# Patient Record
Sex: MALE | Race: WHITE | NOT HISPANIC OR LATINO | Employment: OTHER | URBAN - METROPOLITAN AREA
[De-identification: names, ages, dates, MRNs, and addresses within clinical notes are randomized per-mention and may not be internally consistent; named-entity substitution may affect disease eponyms.]

---

## 2023-03-04 PROCEDURE — 99232 SBSQ HOSP IP/OBS MODERATE 35: CPT | Performed by: INTERNAL MEDICINE

## 2023-03-05 PROCEDURE — 99232 SBSQ HOSP IP/OBS MODERATE 35: CPT | Performed by: INTERNAL MEDICINE

## 2023-03-06 PROCEDURE — 99232 SBSQ HOSP IP/OBS MODERATE 35: CPT | Performed by: INTERNAL MEDICINE

## 2023-03-07 PROCEDURE — 99232 SBSQ HOSP IP/OBS MODERATE 35: CPT | Performed by: INTERNAL MEDICINE

## 2023-03-08 PROCEDURE — 99239 HOSP IP/OBS DSCHRG MGMT >30: CPT | Performed by: INTERNAL MEDICINE

## 2023-03-11 ENCOUNTER — HOSPITAL ENCOUNTER (INPATIENT)
Dept: DATA CONVERSION | Facility: HOSPITAL | Age: 68
Discharge: HOME HEALTH | End: 2023-03-17
Attending: STUDENT IN AN ORGANIZED HEALTH CARE EDUCATION/TRAINING PROGRAM | Admitting: STUDENT IN AN ORGANIZED HEALTH CARE EDUCATION/TRAINING PROGRAM
Payer: MEDICARE

## 2023-03-11 DIAGNOSIS — Z48.01 ENCOUNTER FOR CHANGE OR REMOVAL OF SURGICAL WOUND DRESSING: ICD-10-CM

## 2023-03-11 DIAGNOSIS — Z20.822 CONTACT WITH AND (SUSPECTED) EXPOSURE TO COVID-19: ICD-10-CM

## 2023-03-11 DIAGNOSIS — Z87.891 PERSONAL HISTORY OF NICOTINE DEPENDENCE: ICD-10-CM

## 2023-03-11 DIAGNOSIS — F10.21 ALCOHOL DEPENDENCE, IN REMISSION (MULTI): ICD-10-CM

## 2023-03-11 DIAGNOSIS — E78.5 HYPERLIPIDEMIA, UNSPECIFIED: ICD-10-CM

## 2023-03-11 DIAGNOSIS — I10 ESSENTIAL (PRIMARY) HYPERTENSION: ICD-10-CM

## 2023-03-11 DIAGNOSIS — T81.32XA DISRUPTION OF INTERNAL OPERATION (SURGICAL) WOUND, NOT ELSEWHERE CLASSIFIED, INITIAL ENCOUNTER: ICD-10-CM

## 2023-03-11 DIAGNOSIS — G58.8 OTHER SPECIFIED MONONEUROPATHIES: ICD-10-CM

## 2023-03-11 DIAGNOSIS — K21.9 GASTRO-ESOPHAGEAL REFLUX DISEASE WITHOUT ESOPHAGITIS: ICD-10-CM

## 2023-03-11 DIAGNOSIS — D62 ACUTE POSTHEMORRHAGIC ANEMIA: ICD-10-CM

## 2023-03-11 LAB
ABO GROUP (TYPE) IN BLOOD: NORMAL
ACTIVATED PARTIAL THROMBOPLASTIN TIME IN PPP BY COAGULATION ASSAY: 30 SEC (ref 26–39)
ALBUMIN (G/DL) IN SER/PLAS: 3.9 G/DL (ref 3.4–5)
ANION GAP IN SER/PLAS: 13 MMOL/L (ref 10–20)
ANTIBODY SCREEN: NORMAL
APPEARANCE, URINE: CLEAR
BILIRUBIN, URINE: NEGATIVE
BLOOD, URINE: NEGATIVE
C REACTIVE PROTEIN (MG/L) IN SER/PLAS: 4.78 MG/DL
CALCIUM (MG/DL) IN SER/PLAS: 10.3 MG/DL (ref 8.6–10.6)
CARBON DIOXIDE, TOTAL (MMOL/L) IN SER/PLAS: 25 MMOL/L (ref 21–32)
CHLORIDE (MMOL/L) IN SER/PLAS: 100 MMOL/L (ref 98–107)
COLOR, URINE: YELLOW
CREATININE (MG/DL) IN SER/PLAS: 0.83 MG/DL (ref 0.5–1.3)
ERYTHROCYTE DISTRIBUTION WIDTH (RATIO) BY AUTOMATED COUNT: 12.5 % (ref 11.5–14.5)
ERYTHROCYTE MEAN CORPUSCULAR HEMOGLOBIN CONCENTRATION (G/DL) BY AUTOMATED: 32.5 G/DL (ref 32–36)
ERYTHROCYTE MEAN CORPUSCULAR VOLUME (FL) BY AUTOMATED COUNT: 88 FL (ref 80–100)
ERYTHROCYTES (10*6/UL) IN BLOOD BY AUTOMATED COUNT: 3.56 X10E12/L (ref 4.5–5.9)
GFR MALE: >90 ML/MIN/1.73M2
GLUCOSE (MG/DL) IN SER/PLAS: 113 MG/DL (ref 74–99)
GLUCOSE, URINE: NEGATIVE MG/DL
HEMATOCRIT (%) IN BLOOD BY AUTOMATED COUNT: 31.4 % (ref 41–52)
HEMOGLOBIN (G/DL) IN BLOOD: 10.2 G/DL (ref 13.5–17.5)
INR IN PPP BY COAGULATION ASSAY: 0.9 (ref 0.9–1.1)
KETONES, URINE: NEGATIVE MG/DL
LEUKOCYTE ESTERASE, URINE: NEGATIVE
LEUKOCYTES (10*3/UL) IN BLOOD BY AUTOMATED COUNT: 6.8 X10E9/L (ref 4.4–11.3)
NITRITE, URINE: NEGATIVE
NRBC (PER 100 WBCS) BY AUTOMATED COUNT: 0 /100 WBC (ref 0–0)
PH, URINE: 7 (ref 5–8)
PHOSPHATE (MG/DL) IN SER/PLAS: 3.6 MG/DL (ref 2.5–4.9)
PLATELETS (10*3/UL) IN BLOOD AUTOMATED COUNT: 296 X10E9/L (ref 150–450)
POTASSIUM (MMOL/L) IN SER/PLAS: 4.2 MMOL/L (ref 3.5–5.3)
PROTEIN, URINE: NEGATIVE MG/DL
PROTHROMBIN TIME (PT) IN PPP BY COAGULATION ASSAY: 10.9 SEC (ref 9.8–13.4)
RH FACTOR: NORMAL
SEDIMENTATION RATE, ERYTHROCYTE: 78 MM/H (ref 0–20)
SODIUM (MMOL/L) IN SER/PLAS: 134 MMOL/L (ref 136–145)
SPECIFIC GRAVITY, URINE: 1.01 (ref 1–1.03)
UREA NITROGEN (MG/DL) IN SER/PLAS: 12 MG/DL (ref 6–23)
UROBILINOGEN, URINE: <2 MG/DL (ref 0–1.9)

## 2023-03-13 LAB
ABO GROUP (TYPE) IN BLOOD: NORMAL
ALBUMIN (G/DL) IN SER/PLAS: 4.1 G/DL (ref 3.4–5)
ANION GAP IN SER/PLAS: 16 MMOL/L (ref 10–20)
CALCIUM (MG/DL) IN SER/PLAS: 10.1 MG/DL (ref 8.6–10.6)
CARBON DIOXIDE, TOTAL (MMOL/L) IN SER/PLAS: 26 MMOL/L (ref 21–32)
CHLORIDE (MMOL/L) IN SER/PLAS: 99 MMOL/L (ref 98–107)
CREATININE (MG/DL) IN SER/PLAS: 1.08 MG/DL (ref 0.5–1.3)
ERYTHROCYTE DISTRIBUTION WIDTH (RATIO) BY AUTOMATED COUNT: 12.9 % (ref 11.5–14.5)
ERYTHROCYTE MEAN CORPUSCULAR HEMOGLOBIN CONCENTRATION (G/DL) BY AUTOMATED: 32.5 G/DL (ref 32–36)
ERYTHROCYTE MEAN CORPUSCULAR VOLUME (FL) BY AUTOMATED COUNT: 88 FL (ref 80–100)
ERYTHROCYTES (10*6/UL) IN BLOOD BY AUTOMATED COUNT: 3.84 X10E12/L (ref 4.5–5.9)
GFR MALE: 75 ML/MIN/1.73M2
GLUCOSE (MG/DL) IN SER/PLAS: 101 MG/DL (ref 74–99)
HEMATOCRIT (%) IN BLOOD BY AUTOMATED COUNT: 33.8 % (ref 41–52)
HEMOGLOBIN (G/DL) IN BLOOD: 11 G/DL (ref 13.5–17.5)
LEUKOCYTES (10*3/UL) IN BLOOD BY AUTOMATED COUNT: 9.2 X10E9/L (ref 4.4–11.3)
NRBC (PER 100 WBCS) BY AUTOMATED COUNT: 0 /100 WBC (ref 0–0)
PHOSPHATE (MG/DL) IN SER/PLAS: 4.2 MG/DL (ref 2.5–4.9)
PLATELETS (10*3/UL) IN BLOOD AUTOMATED COUNT: 400 X10E9/L (ref 150–450)
POTASSIUM (MMOL/L) IN SER/PLAS: 4.2 MMOL/L (ref 3.5–5.3)
RH FACTOR: NORMAL
SARS-COV-2 RESULT: NOT DETECTED
SODIUM (MMOL/L) IN SER/PLAS: 137 MMOL/L (ref 136–145)
UREA NITROGEN (MG/DL) IN SER/PLAS: 21 MG/DL (ref 6–23)

## 2023-03-14 LAB
ABO GROUP (TYPE) IN BLOOD: NORMAL
ANTIBODY SCREEN: NORMAL
GRAM STAIN: NORMAL
GRAM STAIN: NORMAL
RH FACTOR: NORMAL
TISSUE/WOUND CULTURE/SMEAR: NORMAL
TISSUE/WOUND CULTURE/SMEAR: NORMAL
VANCOMYCIN (UG/ML) IN SER/PLAS - TROUGH: NORMAL

## 2023-03-15 LAB
BLOOD CULTURE: NORMAL
BLOOD CULTURE: NORMAL

## 2023-03-16 LAB
ALBUMIN (G/DL) IN SER/PLAS: 3 G/DL (ref 3.4–5)
ANION GAP IN SER/PLAS: 15 MMOL/L (ref 10–20)
CALCIUM (MG/DL) IN SER/PLAS: 9.1 MG/DL (ref 8.6–10.6)
CARBON DIOXIDE, TOTAL (MMOL/L) IN SER/PLAS: 23 MMOL/L (ref 21–32)
CHLORIDE (MMOL/L) IN SER/PLAS: 98 MMOL/L (ref 98–107)
CREATININE (MG/DL) IN SER/PLAS: 0.86 MG/DL (ref 0.5–1.3)
ERYTHROCYTE DISTRIBUTION WIDTH (RATIO) BY AUTOMATED COUNT: 12.8 % (ref 11.5–14.5)
ERYTHROCYTE MEAN CORPUSCULAR HEMOGLOBIN CONCENTRATION (G/DL) BY AUTOMATED: 33.5 G/DL (ref 32–36)
ERYTHROCYTE MEAN CORPUSCULAR VOLUME (FL) BY AUTOMATED COUNT: 88 FL (ref 80–100)
ERYTHROCYTES (10*6/UL) IN BLOOD BY AUTOMATED COUNT: 3 X10E12/L (ref 4.5–5.9)
GFR MALE: >90 ML/MIN/1.73M2
GLUCOSE (MG/DL) IN SER/PLAS: 109 MG/DL (ref 74–99)
HEMATOCRIT (%) IN BLOOD BY AUTOMATED COUNT: 26.3 % (ref 41–52)
HEMOGLOBIN (G/DL) IN BLOOD: 8.8 G/DL (ref 13.5–17.5)
LEUKOCYTES (10*3/UL) IN BLOOD BY AUTOMATED COUNT: 9.1 X10E9/L (ref 4.4–11.3)
NRBC (PER 100 WBCS) BY AUTOMATED COUNT: 0 /100 WBC (ref 0–0)
PHOSPHATE (MG/DL) IN SER/PLAS: 3.5 MG/DL (ref 2.5–4.9)
PLATELETS (10*3/UL) IN BLOOD AUTOMATED COUNT: 286 X10E9/L (ref 150–450)
POCT GLUCOSE: 119 MG/DL (ref 74–99)
POTASSIUM (MMOL/L) IN SER/PLAS: 4 MMOL/L (ref 3.5–5.3)
SODIUM (MMOL/L) IN SER/PLAS: 132 MMOL/L (ref 136–145)
UREA NITROGEN (MG/DL) IN SER/PLAS: 14 MG/DL (ref 6–23)
VANCOMYCIN (UG/ML) IN SER/PLAS - TROUGH: 13.8 UG/ML (ref 5–20)

## 2023-03-17 LAB
ALBUMIN (G/DL) IN SER/PLAS: 3.3 G/DL (ref 3.4–5)
ANION GAP IN SER/PLAS: 11 MMOL/L (ref 10–20)
CALCIUM (MG/DL) IN SER/PLAS: 9.6 MG/DL (ref 8.6–10.6)
CARBON DIOXIDE, TOTAL (MMOL/L) IN SER/PLAS: 27 MMOL/L (ref 21–32)
CHLORIDE (MMOL/L) IN SER/PLAS: 100 MMOL/L (ref 98–107)
CREATININE (MG/DL) IN SER/PLAS: 0.88 MG/DL (ref 0.5–1.3)
ERYTHROCYTE DISTRIBUTION WIDTH (RATIO) BY AUTOMATED COUNT: 12.9 % (ref 11.5–14.5)
ERYTHROCYTE MEAN CORPUSCULAR HEMOGLOBIN CONCENTRATION (G/DL) BY AUTOMATED: 33.7 G/DL (ref 32–36)
ERYTHROCYTE MEAN CORPUSCULAR VOLUME (FL) BY AUTOMATED COUNT: 87 FL (ref 80–100)
ERYTHROCYTES (10*6/UL) IN BLOOD BY AUTOMATED COUNT: 2.97 X10E12/L (ref 4.5–5.9)
GFR MALE: >90 ML/MIN/1.73M2
GLUCOSE (MG/DL) IN SER/PLAS: 104 MG/DL (ref 74–99)
GRAM STAIN: NORMAL
GRAM STAIN: NORMAL
HEMATOCRIT (%) IN BLOOD BY AUTOMATED COUNT: 25.8 % (ref 41–52)
HEMOGLOBIN (G/DL) IN BLOOD: 8.7 G/DL (ref 13.5–17.5)
LEUKOCYTES (10*3/UL) IN BLOOD BY AUTOMATED COUNT: 6.9 X10E9/L (ref 4.4–11.3)
NRBC (PER 100 WBCS) BY AUTOMATED COUNT: 0 /100 WBC (ref 0–0)
PHOSPHATE (MG/DL) IN SER/PLAS: 3.4 MG/DL (ref 2.5–4.9)
PLATELETS (10*3/UL) IN BLOOD AUTOMATED COUNT: 284 X10E9/L (ref 150–450)
POTASSIUM (MMOL/L) IN SER/PLAS: 4.2 MMOL/L (ref 3.5–5.3)
SODIUM (MMOL/L) IN SER/PLAS: 134 MMOL/L (ref 136–145)
TISSUE/WOUND CULTURE/SMEAR: NORMAL
TISSUE/WOUND CULTURE/SMEAR: NORMAL
UREA NITROGEN (MG/DL) IN SER/PLAS: 13 MG/DL (ref 6–23)

## 2023-03-27 LAB
FUNGAL CULTURE/SMEAR: NORMAL
FUNGAL CULTURE/SMEAR: NORMAL
FUNGAL SMEAR: NORMAL
FUNGAL SMEAR: NORMAL

## 2023-04-04 ENCOUNTER — OFFICE VISIT (OUTPATIENT)
Dept: PRIMARY CARE | Facility: CLINIC | Age: 68
End: 2023-04-04
Payer: MEDICARE

## 2023-04-04 VITALS
SYSTOLIC BLOOD PRESSURE: 126 MMHG | HEART RATE: 97 BPM | WEIGHT: 174 LBS | DIASTOLIC BLOOD PRESSURE: 70 MMHG | OXYGEN SATURATION: 97 % | TEMPERATURE: 97.7 F

## 2023-04-04 DIAGNOSIS — M10.122: ICD-10-CM

## 2023-04-04 DIAGNOSIS — T56.0X1S: ICD-10-CM

## 2023-04-04 DIAGNOSIS — R73.9 HYPERGLYCEMIA: ICD-10-CM

## 2023-04-04 DIAGNOSIS — I10 BENIGN ESSENTIAL HYPERTENSION: ICD-10-CM

## 2023-04-04 DIAGNOSIS — I10 PRIMARY HYPERTENSION: ICD-10-CM

## 2023-04-04 DIAGNOSIS — D50.0 IRON DEFICIENCY ANEMIA DUE TO CHRONIC BLOOD LOSS: ICD-10-CM

## 2023-04-04 DIAGNOSIS — S14.129A: ICD-10-CM

## 2023-04-04 DIAGNOSIS — M51.26 DISPLACEMENT OF LUMBAR INTERVERTEBRAL DISC WITHOUT MYELOPATHY: ICD-10-CM

## 2023-04-04 DIAGNOSIS — N40.0 BENIGN PROSTATIC HYPERPLASIA WITHOUT LOWER URINARY TRACT SYMPTOMS: ICD-10-CM

## 2023-04-04 DIAGNOSIS — E55.9 VITAMIN D DEFICIENCY: ICD-10-CM

## 2023-04-04 DIAGNOSIS — E03.9 ACQUIRED HYPOTHYROIDISM: ICD-10-CM

## 2023-04-04 DIAGNOSIS — L03.90 ACUTE CELLULITIS: Primary | ICD-10-CM

## 2023-04-04 DIAGNOSIS — F10.10 ALCOHOL ABUSE: ICD-10-CM

## 2023-04-04 DIAGNOSIS — F10.10 ETOH ABUSE: ICD-10-CM

## 2023-04-04 DIAGNOSIS — E22.2 SIADH (SYNDROME OF INAPPROPRIATE ADH PRODUCTION) (MULTI): ICD-10-CM

## 2023-04-04 DIAGNOSIS — T56.0X1S LEAD-INDUCED CHRONIC GOUT OF MULTIPLE SITES WITHOUT TOPHUS, SEQUELA: ICD-10-CM

## 2023-04-04 DIAGNOSIS — M46.1 SACROILIITIS, NOT ELSEWHERE CLASSIFIED (CMS-HCC): ICD-10-CM

## 2023-04-04 DIAGNOSIS — E78.2 MIXED HYPERLIPIDEMIA: ICD-10-CM

## 2023-04-04 DIAGNOSIS — M1A.19X0 LEAD-INDUCED CHRONIC GOUT OF MULTIPLE SITES WITHOUT TOPHUS, SEQUELA: ICD-10-CM

## 2023-04-04 DIAGNOSIS — F41.1 ANXIETY STATE: ICD-10-CM

## 2023-04-04 PROBLEM — T81.31XA DEHISCENCE OF SURGICAL WOUND: Status: ACTIVE | Noted: 2023-03-10

## 2023-04-04 PROBLEM — M10.9 GOUT OF LEFT ELBOW: Status: ACTIVE | Noted: 2023-04-04

## 2023-04-04 PROBLEM — T84.216A: Status: ACTIVE | Noted: 2023-04-04

## 2023-04-04 PROBLEM — G89.18 ACUTE POSTOPERATIVE PAIN: Status: RESOLVED | Noted: 2023-04-04 | Resolved: 2023-04-04

## 2023-04-04 PROBLEM — G95.9 CERVICAL MYELOPATHY (MULTI): Status: RESOLVED | Noted: 2023-04-04 | Resolved: 2023-04-04

## 2023-04-04 PROBLEM — R01.1 UNDIAGNOSED CARDIAC MURMURS: Status: ACTIVE | Noted: 2023-04-04

## 2023-04-04 PROBLEM — G95.9 CERVICAL MYELOPATHY (MULTI): Status: ACTIVE | Noted: 2023-04-04

## 2023-04-04 PROBLEM — S21.209S: Status: ACTIVE | Noted: 2023-04-04

## 2023-04-04 PROBLEM — G89.18 ACUTE POSTOPERATIVE PAIN: Status: ACTIVE | Noted: 2023-04-04

## 2023-04-04 PROBLEM — R01.1 UNDIAGNOSED CARDIAC MURMURS: Status: RESOLVED | Noted: 2023-04-04 | Resolved: 2023-04-04

## 2023-04-04 PROBLEM — E78.5 HYPERLIPIDEMIA: Status: ACTIVE | Noted: 2023-04-04

## 2023-04-04 PROCEDURE — 1036F TOBACCO NON-USER: CPT | Performed by: INTERNAL MEDICINE

## 2023-04-04 PROCEDURE — 99214 OFFICE O/P EST MOD 30 MIN: CPT | Performed by: INTERNAL MEDICINE

## 2023-04-04 PROCEDURE — 3078F DIAST BP <80 MM HG: CPT | Performed by: INTERNAL MEDICINE

## 2023-04-04 PROCEDURE — 1160F RVW MEDS BY RX/DR IN RCRD: CPT | Performed by: INTERNAL MEDICINE

## 2023-04-04 PROCEDURE — 3074F SYST BP LT 130 MM HG: CPT | Performed by: INTERNAL MEDICINE

## 2023-04-04 PROCEDURE — 1159F MED LIST DOCD IN RCRD: CPT | Performed by: INTERNAL MEDICINE

## 2023-04-04 RX ORDER — GABAPENTIN 300 MG/1
CAPSULE ORAL
Qty: 270 CAPSULE | Refills: 3 | Status: SHIPPED | OUTPATIENT
Start: 2023-04-04 | End: 2024-04-11 | Stop reason: SDUPTHER

## 2023-04-04 RX ORDER — GABAPENTIN 100 MG/1
300 CAPSULE ORAL 2 TIMES DAILY
Qty: 180 CAPSULE | Refills: 0 | Status: SHIPPED | OUTPATIENT
Start: 2023-04-04 | End: 2023-04-10 | Stop reason: ALTCHOICE

## 2023-04-04 RX ORDER — ALLOPURINOL 100 MG/1
100 TABLET ORAL 2 TIMES DAILY
Qty: 60 TABLET | Refills: 11 | Status: SHIPPED | OUTPATIENT
Start: 2023-04-04 | End: 2023-11-30 | Stop reason: WASHOUT

## 2023-04-04 RX ORDER — VIT C/E/ZN/COPPR/LUTEIN/ZEAXAN 250MG-90MG
CAPSULE ORAL
COMMUNITY
End: 2023-11-13 | Stop reason: ALTCHOICE

## 2023-04-04 RX ORDER — LANOLIN ALCOHOL/MO/W.PET/CERES
1 CREAM (GRAM) TOPICAL DAILY
COMMUNITY
Start: 2022-12-27 | End: 2023-11-30 | Stop reason: WASHOUT

## 2023-04-04 RX ORDER — FOLIC ACID 1 MG/1
1 TABLET ORAL DAILY
COMMUNITY
Start: 2022-12-27 | End: 2023-04-04 | Stop reason: SDUPTHER

## 2023-04-04 RX ORDER — FOLIC ACID 1 MG/1
1 TABLET ORAL DAILY
Qty: 90 TABLET | Refills: 3 | Status: SHIPPED | OUTPATIENT
Start: 2023-04-04 | End: 2024-04-11 | Stop reason: SDUPTHER

## 2023-04-04 RX ORDER — ATORVASTATIN CALCIUM 20 MG/1
20 TABLET, FILM COATED ORAL DAILY
Qty: 90 TABLET | Refills: 3 | Status: SHIPPED | OUTPATIENT
Start: 2023-04-04 | End: 2024-04-18 | Stop reason: SDUPTHER

## 2023-04-04 RX ORDER — AMLODIPINE BESYLATE 5 MG/1
5 TABLET ORAL DAILY
Qty: 90 TABLET | Refills: 3 | Status: SHIPPED | OUTPATIENT
Start: 2023-04-04 | End: 2024-04-11 | Stop reason: SDUPTHER

## 2023-04-04 RX ORDER — GABAPENTIN 100 MG/1
CAPSULE ORAL
COMMUNITY
Start: 2023-02-10 | End: 2023-04-04 | Stop reason: SDUPTHER

## 2023-04-04 RX ORDER — MULTIVITAMIN
1 TABLET ORAL DAILY
COMMUNITY
End: 2023-08-24 | Stop reason: ALTCHOICE

## 2023-04-04 RX ORDER — CYCLOBENZAPRINE HCL 10 MG
TABLET ORAL 3 TIMES DAILY PRN
COMMUNITY
Start: 2023-03-08 | End: 2023-08-24 | Stop reason: ALTCHOICE

## 2023-04-04 RX ORDER — MELOXICAM 15 MG/1
15 TABLET ORAL DAILY
Qty: 30 TABLET | Refills: 11 | Status: SHIPPED | OUTPATIENT
Start: 2023-04-04 | End: 2023-11-13 | Stop reason: ALTCHOICE

## 2023-04-04 RX ORDER — ATORVASTATIN CALCIUM 20 MG/1
1 TABLET, FILM COATED ORAL DAILY
COMMUNITY
End: 2023-04-04 | Stop reason: SDUPTHER

## 2023-04-04 RX ORDER — LOSARTAN POTASSIUM 100 MG/1
1 TABLET ORAL DAILY
COMMUNITY
Start: 2023-03-08 | End: 2023-04-04 | Stop reason: SDUPTHER

## 2023-04-04 RX ORDER — ACETAMINOPHEN 325 MG/1
TABLET ORAL EVERY 6 HOURS PRN
COMMUNITY

## 2023-04-04 RX ORDER — LOSARTAN POTASSIUM 100 MG/1
100 TABLET ORAL DAILY
Qty: 90 TABLET | Refills: 3 | Status: SHIPPED | OUTPATIENT
Start: 2023-04-04 | End: 2024-04-18 | Stop reason: SDUPTHER

## 2023-04-04 RX ORDER — GABAPENTIN 300 MG/1
CAPSULE ORAL
Qty: 270 CAPSULE | Refills: 3 | Status: SHIPPED | OUTPATIENT
Start: 2023-04-04 | End: 2023-04-04 | Stop reason: SDUPTHER

## 2023-04-04 RX ORDER — CLONIDINE HYDROCHLORIDE 0.1 MG/1
TABLET ORAL 2 TIMES DAILY
COMMUNITY
Start: 2023-03-08 | End: 2023-11-30 | Stop reason: WASHOUT

## 2023-04-04 RX ORDER — OXYCODONE HYDROCHLORIDE 5 MG/1
TABLET ORAL
COMMUNITY
End: 2023-08-24 | Stop reason: ALTCHOICE

## 2023-04-04 RX ORDER — DOCUSATE SODIUM 50 MG AND SENNOSIDES 8.6 MG 8.6; 5 MG/1; MG/1
TABLET, FILM COATED ORAL
COMMUNITY
Start: 2023-03-17 | End: 2023-08-24 | Stop reason: ALTCHOICE

## 2023-04-04 RX ORDER — AMLODIPINE BESYLATE 5 MG/1
TABLET ORAL
COMMUNITY
Start: 2022-03-16 | End: 2023-04-04 | Stop reason: SDUPTHER

## 2023-04-04 RX ORDER — AMOXICILLIN AND CLAVULANATE POTASSIUM 875; 125 MG/1; MG/1
875 TABLET, FILM COATED ORAL 2 TIMES DAILY
Qty: 20 TABLET | Refills: 0 | Status: SHIPPED | OUTPATIENT
Start: 2023-04-04 | End: 2023-04-14

## 2023-04-04 ASSESSMENT — ENCOUNTER SYMPTOMS
OCCASIONAL FEELINGS OF UNSTEADINESS: 1
DEPRESSION: 0
LOSS OF SENSATION IN FEET: 0

## 2023-04-04 NOTE — PROGRESS NOTES
Patient ID: Yoan Sharp is a 67 y.o. male who presents for Establish Care (Hospital follow up ).    Assessment/Plan     Problem List Items Addressed This Visit          Circulatory    Hypertension     Patients BP readings reviewed and addressed, as we age our arteries turn stiffer and less elastic. Restricting salt consumption and staying physically fit with regular exercise regimen is the only way to keep our vasculature less tonic. Studies have shown that keeping ideal body wt, exercise routine about 140 to 150 minutes a week, eating variety of plant based diet and drinking plentiful water are quite helpful. Monitor BP twice or once a week at home and bring log to be reviewed by me. Uncontrolled BP has long term consequences including heart failure, myocardial infarction, accelerated atherosclerosis and kidney dysfunction. Therapy reviewed and explained.  '            Musculoskeletal    Gout of left elbow     Aseptic antiseptic precaution aspiration of 20 mL of the parvez-colored fluid from the left elbow sent for the culture sensitivity crystal given patient colchicine indomethacin antibiotics follow-up         Relevant Medications    allopurinol (Zyloprim) 100 mg tablet    RESOLVED: Displacement of lumbar intervertebral disc without myelopathy    RESOLVED: Sacroiliitis, not elsewhere classified (CMS/HCC)    Relevant Medications    meloxicam (Mobic) 15 mg tablet    gabapentin (Neurontin) 300 mg capsule    Other Relevant Orders    Arthritis Panel (CMS)       Other    Anxiety state     PHQ less than 5 continue Cymbalta         ETOH abuse     Advised breakthrough program joint AAA B12 folic acid thiamine         Hyperlipidemia     Check thyroid lipid profile low-fat diet          Other Visit Diagnoses       Acute cellulitis    -  Primary    Relevant Medications    amoxicillin-pot clavulanate (Augmentin) 875-125 mg tablet    Central cord syndrome at unspecified level of cervical spinal cord, initial encounter  (CMS/[facility])        Acquired hypothyroidism        Relevant Orders    TSH with reflex to Free T4 if abnormal    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, sequela        Benign essential hypertension        Relevant Orders    Comprehensive Metabolic Panel    Lipid Panel    Iron deficiency anemia due to chronic blood loss        Relevant Orders    CBC    Vitamin D deficiency        Relevant Orders    Vitamin D, Total    SIADH (syndrome of inappropriate ADH production) (CMS/[facility])        Relevant Orders    CBC    Comprehensive Metabolic Panel    TSH with reflex to Free T4 if abnormal    Alcohol abuse        Benign prostatic hyperplasia without lower urinary tract symptoms        Relevant Orders    Urinalysis Microscopic Only    Prostate Specific Antigen, Screen    Hyperglycemia        Relevant Orders    Hemoglobin A1C          I spent 15 minutes obtaining and discussing depression screening using pHq-2 questions with patient documented in the chart treatment plan discussI spent 15 minutes face-to-face voluntary discuss with patient about Advance care planning DO NOT RESUSCITATE I  spent more than 18 minutes discussing advanced Planning including an explanation and discussion of advanced directives discussed about a living will and power of  patient was encouraged to work on completing this documentation options are1= DO NOT RESUSCITATE CC2=, DO NOT RESUSCITATE  at arrest,3= full code documented in the chart.  Patient hospitalized since last visit medication list reviewed and  reconciled with discharge medications face to face visit with patient discuss discharge medication and outpatient medication ceconciliations and answers all questions to patient's and caregiver review hospital record pending diagnostic test and treatment orders to improved coordinate care across the medical community and  referal with provider/service to support patient's health and health related problems to increase  patient's satisfaction by reducing risk of readmission I improving And meeting patient's needs advise bring all prescription and nonprescription medication with you.      Source of history: Nurse, Medical personnel, Medical record, Patient.  History limitation: None.      HPI this is a 67-year-old patient who was admitted the [facility] and underwent further cervical surgery left-sided upper extremity weakness had a open wound and debridement [facility] diagnosis of anemia SIADH operation including the IV antibiotic doxycycline amoxicillin probiotics seen by neurosurgery plastic surgery wound was was closed with a VAC treatment    Continue have increased redness swelling tenderness of the left elbow with the fluid collections history of the alcohol use plus dehydration possible cellulitis versus gouty arthritis aseptic antiseptic precaution 20 mL of the fluid was aspirated given patient's antibiotic probiotics and colchicine allopurinol sent for the culture sensitivity and further care    Negative for fever or chills    Negative for diarrhea    History of alcohol and smoking history of the fall with injury to the spine and with multiple surgery including infections review [facility] records [facility] records discussed with the infectious disease plastic surgery and neurosurgery in detail    Allergies   Allergen Reactions    Iodine Unknown    Lisinopril Unknown and Swelling     Swelling of the face    Shellfish Containing Products Unknown       Medications    Current Outpatient Medications   Medication Sig Dispense Refill    allopurinol (Zyloprim) 100 mg tablet Take 1 tablet (100 mg) by mouth in the morning and 1 tablet (100 mg) before bedtime. 60 tablet 11    amoxicillin-pot clavulanate (Augmentin) 875-125 mg tablet Take 1 tablet (875 mg) by mouth in the morning and 1 tablet (875 mg) before bedtime. Do all this for 10 days. 20 tablet 0    gabapentin (Neurontin) 300 mg capsule Take 1 capsule  (300 mg) by mouth once daily in the morning AND 2 capsules (600 mg) once daily in the evening. Do all this for 7 days. 270 capsule 3    meloxicam (Mobic) 15 mg tablet Take 1 tablet (15 mg) by mouth once daily. 30 tablet 11     No current facility-administered medications for this visit.       Objective   Visit Vitals  /70 (BP Location: Right arm, Patient Position: Sitting, BP Cuff Size: Large adult)   Pulse 97   Temp 36.5 °C (97.7 °F)   Wt 78.9 kg (174 lb)   SpO2 97%   Smoking Status Former       PHYSICAL EXAM  General: NAD. NCAT. Aox3   HEENT: PERRLA. EOMI. MMM. Nares patent bl.  Cardiovascular: Heart murmur l.   Respiratory: Crackles rales rhonchi   GI: Soft, NT abdomen. BS present x 4.   : No CVAT BL  MSK: ROM x 4. CTLS non-tender.   Extremities: Left elbow cellulitis fluid collection tenderness  Skin: Postoperative scarring bruise under stitches on the neck.   Neuro: Left shoulder elbow arm weakness cervical radiculopathy postop  Psych: Mood wnl.        ROS  Constitutional: Denies fevers, chills, fatigue, weight loss/gain  HEENT: Denies HA, vision changes, hearing loss, sore throat  Cardiac: Denies CP, palpitations, edema  Respiratory: Denies SOB, cough, pleuritic chest pain, PND, orthopnea  GI: Denies N/V/D, abd pain, constipation, black/bloody stools  : Denies urinary changes, frequency, hematuria, urgency, retention, flank pain  MSK: Denies joint pain, joint swelling, back pain, neck pain, extremity pain  Neuro: Denies numbness, weakness, tingling    Immunization History   Administered Date(s) Administered    Influenza, High-dose Seasonal, Quadrivalent, Preservative Free 11/05/2020    Moderna SARS-CoV-2 Vaccination 03/03/2021, 04/07/2021, 11/19/2021    Pneumococcal Polysaccharide PPSV23 10/09/2018    Tdap 06/29/2022       No visits with results within 4 Month(s) from this visit.   Latest known visit with results is:   No results found for any previous visit.       Radiology: Reviewed imaging in  powerchart.  FL less than 1 hour intraoperative    Result Date: 3/14/2023  Interpreted By:  NHI GUADARRAMA MD and CORINNE POLK DO MRN: [id number] Patient Name: DESTINY INFANTE  STUDY: FLUOROSCOPY, UP TO 1 HR;  3/13/2023 1:15 pm  INDICATION: concern for hemidiaphragm weakness .  COMPARISON: Chest radiograph 03/11/2023  ACCESSION NUMBER(S): [id number]  ORDERING CLINICIAN: MATHEW LÓPEZ  TECHNIQUE: Fluoroscopic support was given to neurology, provided by radiology, for this sniff test.   845 cine images were obtained per protocol. Fluoroscopic time was  1.2 minutes.  FINDINGS: The bilateral hemidiaphragms move symmetrically.. Correlation with real-time fluoroscopy and clinical history is recommended.      1. Status post diaphragm function test. 2. Findings as stated above. Please see neurology report for full details.  I personally reviewed the images/study and I agree with the findings as stated above by resident physician, Dr. Anderson Nguyen. The study was interpreted at [facility] in [facility].    CT cervical spine wo IV contrast    Result Date: 3/12/2023  Interpreted By:  RAJI RODRIGUEZ MD and CONNIE CAMPUZANO MD MRN: [id number] Patient Name: DESTINY INFANTE  STUDY: CT C-SPINE WO CONTRAST;  3/11/2023 9:11 pm  INDICATION: wound dehiscence, eval for fluid collection, Lie Flat: Yes .  COMPARISON: MRI cervical spine dated 12/27/2022 CT cervical spine dated 12/24/2022  ACCESSION NUMBER(S): [id number]  ORDERING CLINICIAN: MATHEW LÓPEZ  TECHNIQUE: Axial noncontrast images of the cervical spine with coronal and sagittal reconstructed images.  FINDINGS: Evaluation of the soft tissues is limited secondary to lack of IV contrast.  PREVERTEBRAL SOFT TISSUES: There is a soft tissue defect of the posterior neck from C5-T1 measuring approximally 4.2 x 3.4 x 4.2 cm (series 303, image 72 and series 306, image 61). There are scattered hypoattenuating areas in the posterior neck around C3-C5 with scattered  foci of air without definite evidence of a fluid collection. There is a small amount of subcutaneous emphysema tracking posteriorly to the level of T1.  CRANIOCERVICAL JUNCTION: Intact.  ALIGNMENT:  No traumatic malalignment or traumatic facet widening.  VERTEBRAE:  No acute fracture. Vertebral body heights are maintained.  SPINAL CANAL/INTERVERTEBRAL DISCS: No high-grade spinal canal stenosis. No significant disc height loss. Status post C3-C6 laminectomies. There are linear lucencies within bilateral lamina and pedicles at multiple levels which may reflect tract from previous surgical hardware. Osseous fragment/bone cement is also noted surrounding bilateral C4-C6 facets.  NEURAL FORAMINA: No significant neural foraminal stenosis. Multilevel uncovertebral joint and facet arthropathy notably contribute to moderate neural foraminal stenosis at C3-C4, moderate bilateral neural foraminal stenosis at C4-C5, moderate bilateral neural foraminal stenosis at C5-C6.  OTHER: No significant abnormality.      1. Soft tissue dehiscence of the posterior neck from C5-T1 with multiple scattered areas of hypoattenuation areas in the posterior neck as well as multiple foci of air without definite evidence of a fluid collection, although evaluation is limited secondary to lack of IV contrast. 2. Status post laminectomies from C3-C6 levels.  I personally reviewed the images/study and I agree with the findings as stated. This study was interpreted at [facility], [place], [place].    XR chest 1 view    Result Date: 3/12/2023  Interpreted By:  ZACH JOE MD and ROSE CHRISTINE MD MRN: [id number] Patient Name: DESTINY INFANTE  STUDY: CHEST 1 VIEW;  3/11/2023 5:56 pm  INDICATION: preop .  COMPARISON: CT 12/25/2022  ACCESSION NUMBER(S): [id number]  ORDERING CLINICIAN: MATHEW LÓPEZ  FINDINGS: AP radiograph of the chest was provided.  CARDIOMEDIASTINAL SILHOUETTE: Cardiomediastinal silhouette is normal in size and  configuration. Atherosclerotic calcification of the aortic knob.  LUNGS: Elevation of the left hemidiaphragm. Hazy left greater than right bibasilar opacities. Additional hazy opacity projecting over the left mid lung. No right-sided pleural effusion. No pneumothorax.  ABDOMEN: No remarkable upper abdominal findings.  BONES: No acute osseous changes.      1. Elevation of the left hemidiaphragm with hazy left basilar opacity. Finding may represent a combination of prominent epicardial fat pad, small volume pleural effusion, and/or atelectasis. 2. Additional hazy opacity projecting over the left mid lung which may represent subsegmental atelectasis versus overlapping structures. 3. Mild right basilar hazy opacity suspected to represent atelectasis.  I personally reviewed the images/study and I agree with the findings as stated. This study was interpreted at [facility], [place], [place].      Family History   Problem Relation Name Age of Onset    Heart disease Father       Social History     Socioeconomic History    Marital status:      Spouse name: None    Number of children: None    Years of education: None    Highest education level: None   Occupational History    None   Tobacco Use    Smoking status: Former     Types: Cigarettes    Smokeless tobacco: Never   Vaping Use    Vaping status: None   Substance and Sexual Activity    Alcohol use: Never    Drug use: Never    Sexual activity: None   Other Topics Concern    None   Social History Narrative    None     Social Determinants of Health     Financial Resource Strain: Not on file   Food Insecurity: Not on file   Transportation Needs: Not on file   Physical Activity: Not on file   Stress: Not on file   Social Connections: Not on file   Intimate Partner Violence: Not on file   Housing Stability: Not on file     History reviewed. No pertinent past medical history.  Past Surgical History:   Procedure Laterality Date    CERVICAL  FUSION  12/30/2022    HARDWARE REMOVAL  03/06/2023    WOUND DEBRIDEMENT       * Cannot find OR log *    Charting was completed using voice recognition technology and may include unintended errors.     Patient was identified as a fall risk. Risk prevention instructions provided.

## 2023-04-04 NOTE — PATIENT INSTRUCTIONS

## 2023-04-04 NOTE — ASSESSMENT & PLAN NOTE
Aseptic antiseptic precaution aspiration of 20 mL of the parvez-colored fluid from the left elbow sent for the culture sensitivity crystal given patient colchicine indomethacin antibiotics follow-up   Patient Quality Outreach    Patient is due for the following:   Physical  - asap    NEXT STEPS:   Schedule a yearly physical   Diabetic Eye Exam    Type of outreach:    Sent [facility] message.    Next Steps:  Reach out within 90 days via Letter.    Max number of attempts reached: No. Will try again in 90 days if patient still on fail list.    Questions for provider review:    None     Mariangel Bingham CMA  Chart routed to self.

## 2023-04-04 NOTE — ASSESSMENT & PLAN NOTE
Patients BP readings reviewed and addressed, as we age our arteries turn stiffer and less elastic. Restricting salt consumption and staying physically fit with regular exercise regimen is the only way to keep our vasculature less tonic. Studies have shown that keeping ideal body wt, exercise routine about 140 to 150 minutes a week, eating variety of plant based diet and drinking plentiful water are quite helpful. Monitor BP twice or once a week at home and bring log to be reviewed by me. Uncontrolled BP has long term consequences including heart failure, myocardial infarction, accelerated atherosclerosis and kidney dysfunction. Therapy reviewed and explained.  '

## 2023-04-05 LAB
CRYSTALS PATH REVIEW: NORMAL
JOINT FLUID CRYSTALS: NORMAL

## 2023-04-07 ENCOUNTER — TELEPHONE (OUTPATIENT)
Dept: PRIMARY CARE | Facility: CLINIC | Age: 68
End: 2023-04-07
Payer: MEDICARE

## 2023-04-07 DIAGNOSIS — F41.1 ANXIETY STATE: ICD-10-CM

## 2023-04-07 DIAGNOSIS — M10.9 GOUT OF LEFT ELBOW, UNSPECIFIED CAUSE, UNSPECIFIED CHRONICITY: ICD-10-CM

## 2023-04-07 RX ORDER — CYCLOBENZAPRINE HCL 10 MG
10 TABLET ORAL 2 TIMES DAILY PRN
Qty: 60 TABLET | Refills: 0 | Status: SHIPPED | OUTPATIENT
Start: 2023-04-07 | End: 2023-11-30 | Stop reason: WASHOUT

## 2023-04-07 NOTE — TELEPHONE ENCOUNTER
He says that the the Meloxicam is not helping and he was wondering if you can give Tramadol to help with pain     Please advise

## 2023-04-10 ENCOUNTER — OFFICE VISIT (OUTPATIENT)
Dept: PRIMARY CARE | Facility: CLINIC | Age: 68
End: 2023-04-10
Payer: MEDICARE

## 2023-04-10 ENCOUNTER — LAB (OUTPATIENT)
Dept: LAB | Facility: LAB | Age: 68
End: 2023-04-10
Payer: MEDICARE

## 2023-04-10 VITALS
WEIGHT: 189 LBS | TEMPERATURE: 97.6 F | HEIGHT: 71 IN | DIASTOLIC BLOOD PRESSURE: 72 MMHG | BODY MASS INDEX: 26.46 KG/M2 | SYSTOLIC BLOOD PRESSURE: 133 MMHG | OXYGEN SATURATION: 97 % | HEART RATE: 88 BPM

## 2023-04-10 DIAGNOSIS — M1A.0220 IDIOPATHIC CHRONIC GOUT OF LEFT ELBOW WITHOUT TOPHUS: Primary | ICD-10-CM

## 2023-04-10 DIAGNOSIS — R29.898 LEFT ARM WEAKNESS: ICD-10-CM

## 2023-04-10 DIAGNOSIS — F41.1 ANXIETY STATE: ICD-10-CM

## 2023-04-10 DIAGNOSIS — M46.1 SACROILIITIS, NOT ELSEWHERE CLASSIFIED (CMS-HCC): ICD-10-CM

## 2023-04-10 DIAGNOSIS — I10 PRIMARY HYPERTENSION: ICD-10-CM

## 2023-04-10 DIAGNOSIS — E78.2 MIXED HYPERLIPIDEMIA: ICD-10-CM

## 2023-04-10 DIAGNOSIS — T81.31XS POSTOPERATIVE WOUND DEHISCENCE, SEQUELA: ICD-10-CM

## 2023-04-10 DIAGNOSIS — F10.10 ETOH ABUSE: ICD-10-CM

## 2023-04-10 PROBLEM — T84.216A: Status: RESOLVED | Noted: 2023-04-04 | Resolved: 2023-04-10

## 2023-04-10 PROBLEM — M25.422 EFFUSION OF BURSA OF LEFT ELBOW: Status: ACTIVE | Noted: 2023-04-10

## 2023-04-10 PROBLEM — S21.209S: Status: RESOLVED | Noted: 2023-04-04 | Resolved: 2023-04-10

## 2023-04-10 LAB
ERYTHROCYTE DISTRIBUTION WIDTH (RATIO) BY AUTOMATED COUNT: 13.5 % (ref 11.5–14.5)
ERYTHROCYTE MEAN CORPUSCULAR HEMOGLOBIN CONCENTRATION (G/DL) BY AUTOMATED: 32.2 G/DL (ref 32–36)
ERYTHROCYTE MEAN CORPUSCULAR VOLUME (FL) BY AUTOMATED COUNT: 90 FL (ref 80–100)
ERYTHROCYTES (10*6/UL) IN BLOOD BY AUTOMATED COUNT: 4.01 X10E12/L (ref 4.5–5.9)
HEMATOCRIT (%) IN BLOOD BY AUTOMATED COUNT: 36 % (ref 41–52)
HEMOGLOBIN (G/DL) IN BLOOD: 11.6 G/DL (ref 13.5–17.5)
LEUKOCYTES (10*3/UL) IN BLOOD BY AUTOMATED COUNT: 7.7 X10E9/L (ref 4.4–11.3)
NRBC (PER 100 WBCS) BY AUTOMATED COUNT: 0 /100 WBC (ref 0–0)
PLATELETS (10*3/UL) IN BLOOD AUTOMATED COUNT: 273 X10E9/L (ref 150–450)

## 2023-04-10 PROCEDURE — 84550 ASSAY OF BLOOD/URIC ACID: CPT

## 2023-04-10 PROCEDURE — 1159F MED LIST DOCD IN RCRD: CPT | Performed by: INTERNAL MEDICINE

## 2023-04-10 PROCEDURE — 85027 COMPLETE CBC AUTOMATED: CPT

## 2023-04-10 PROCEDURE — 80053 COMPREHEN METABOLIC PANEL: CPT

## 2023-04-10 PROCEDURE — 84153 ASSAY OF PSA TOTAL: CPT

## 2023-04-10 PROCEDURE — 84443 ASSAY THYROID STIM HORMONE: CPT

## 2023-04-10 PROCEDURE — 36415 COLL VENOUS BLD VENIPUNCTURE: CPT

## 2023-04-10 PROCEDURE — 96372 THER/PROPH/DIAG INJ SC/IM: CPT | Performed by: INTERNAL MEDICINE

## 2023-04-10 PROCEDURE — 3075F SYST BP GE 130 - 139MM HG: CPT | Performed by: INTERNAL MEDICINE

## 2023-04-10 PROCEDURE — 1036F TOBACCO NON-USER: CPT | Performed by: INTERNAL MEDICINE

## 2023-04-10 PROCEDURE — 3078F DIAST BP <80 MM HG: CPT | Performed by: INTERNAL MEDICINE

## 2023-04-10 PROCEDURE — 86038 ANTINUCLEAR ANTIBODIES: CPT

## 2023-04-10 PROCEDURE — 83036 HEMOGLOBIN GLYCOSYLATED A1C: CPT

## 2023-04-10 PROCEDURE — 86431 RHEUMATOID FACTOR QUANT: CPT

## 2023-04-10 PROCEDURE — 99213 OFFICE O/P EST LOW 20 MIN: CPT | Performed by: INTERNAL MEDICINE

## 2023-04-10 PROCEDURE — 1160F RVW MEDS BY RX/DR IN RCRD: CPT | Performed by: INTERNAL MEDICINE

## 2023-04-10 PROCEDURE — 80061 LIPID PANEL: CPT

## 2023-04-10 PROCEDURE — 82306 VITAMIN D 25 HYDROXY: CPT

## 2023-04-10 PROCEDURE — 85652 RBC SED RATE AUTOMATED: CPT

## 2023-04-10 RX ORDER — METHYLPREDNISOLONE ACETATE 40 MG/ML
100 INJECTION, SUSPENSION INTRA-ARTICULAR; INTRALESIONAL; INTRAMUSCULAR; SOFT TISSUE ONCE
Status: COMPLETED | OUTPATIENT
Start: 2023-04-10 | End: 2023-04-10

## 2023-04-10 RX ADMIN — METHYLPREDNISOLONE ACETATE 104 MG: 40 INJECTION, SUSPENSION INTRA-ARTICULAR; INTRALESIONAL; INTRAMUSCULAR; SOFT TISSUE at 15:19

## 2023-04-10 NOTE — PROGRESS NOTES
Patient ID: Yoan Sharp is a 67 y.o. male who presents for Establish Care (1 week follow up ).    Assessment/Plan     Problem List Items Addressed This Visit          Circulatory    Hypertension     Patients BP readings reviewed and addressed, as we age our arteries turn stiffer and less elastic. Restricting salt consumption and staying physically fit with regular exercise regimen is the only way to keep our vasculature less tonic. Studies have shown that keeping ideal body wt, exercise routine about 140 to 150 minutes a week, eating variety of plant based diet and drinking plentiful water are quite helpful. Monitor BP twice or once a week at home and bring log to be reviewed by me. Uncontrolled BP has long term consequences including heart failure, myocardial infarction, accelerated atherosclerosis and kidney dysfunction. Therapy reviewed and explained.              Musculoskeletal    Gout of left elbow - Primary     Given Solu-Medrol 100 mg intra-articular injection         Relevant Orders    Referral to Physical Therapy    Left arm weakness     Postop in nature advised physical therapy         Relevant Orders    Referral to Physical Therapy       Other    Anxiety state    Dehiscence of surgical wound    ETOH abuse     B12 folic acid thiamine advised to join AA group         Hyperlipidemia       Source of history: Nurse, Medical personnel, Medical record, Patient.  History limitation: None.      HPI complaining the neck pain back pain left arm weakness left elbow effusion onset gradually duration few weeks progressed slowly aggravating factor infection inflammation gouty arthritis osteoarthritis osteopenia history of alcoholic myopathy neuropathy advised B12 folic acid given Solu-Medrol gabapentin B12 folic acid refer to physical Occupational Therapy advised to see the pain management order blood test she does not do the last visit advised to do blood test today follow-up 6 weeks arthritis    Allergies    Allergen Reactions    Iodine Unknown    Lisinopril Unknown and Swelling     Swelling of the face    Shellfish Containing Products Unknown       Medications    Current Outpatient Medications   Medication Sig Dispense Refill    acetaminophen (Tylenol) 325 mg tablet Take by mouth every 6 hours if needed for mild pain (1 - 3).      allopurinol (Zyloprim) 100 mg tablet Take 1 tablet (100 mg) by mouth in the morning and 1 tablet (100 mg) before bedtime. 60 tablet 11    amLODIPine (Norvasc) 5 mg tablet Take 1 tablet (5 mg) by mouth once daily. 90 tablet 3    amoxicillin-pot clavulanate (Augmentin) 875-125 mg tablet Take 1 tablet (875 mg) by mouth in the morning and 1 tablet (875 mg) before bedtime. Do all this for 10 days. 20 tablet 0    atorvastatin (Lipitor) 20 mg tablet Take 1 tablet (20 mg) by mouth once daily. 90 tablet 3    cholecalciferol (Vitamin D-3) 25 MCG (1000 UT) capsule Take by mouth.      cloNIDine (Catapres) 0.1 mg tablet Take by mouth twice a day.      cyclobenzaprine (Flexeril) 10 mg tablet Take 1 tablet (10 mg) by mouth 2 times a day as needed for muscle spasms. 60 tablet 0    folic acid (Folvite) 1 mg tablet Take 1 tablet (1 mg) by mouth once daily. 90 tablet 3    gabapentin (Neurontin) 300 mg capsule Take 1 capsule (300 mg) by mouth once daily in the morning AND 2 capsules (600 mg) once daily in the evening. 270 capsule 3    losartan (Cozaar) 100 mg tablet Take 1 tablet (100 mg) by mouth once daily. 90 tablet 3    meloxicam (Mobic) 15 mg tablet Take 1 tablet (15 mg) by mouth once daily. 30 tablet 11    multivitamin tablet Take 1 tablet by mouth once daily.      Stimulant Laxative Plus 8.6-50 mg tablet       cyclobenzaprine (Flexeril) 10 mg tablet Take by mouth 3 times a day as needed.      oxyCODONE (Roxicodone) 5 mg immediate release tablet TAKE 1 TABLET BY MOUTH EVERY 6 HOURS AS NEEDED FOR MODERATE TO SEVERE POST-OP PAIN      thiamine 100 mg tablet Take 1 tablet (100 mg) by mouth once daily.    "    No current facility-administered medications for this visit.       Objective   Visit Vitals  /72 (BP Location: Right arm, Patient Position: Sitting, BP Cuff Size: Adult)   Pulse 88   Temp 36.4 °C (97.6 °F)   Ht 1.803 m (5' 11\")   Wt 85.7 kg (189 lb)   SpO2 97%   BMI 26.36 kg/m²   Smoking Status Former   BSA 2.07 m²       PHYSICAL EXAM  General: NAD. NCAT. Aox3   HEENT: PERRLA. EOMI. MMM. Nares patent bl.  Cardiovascular: RRR. No MRG. S1/S2 wnl.   Respiratory: CTABL. No acute respiratory distress.   GI: Soft, NT abdomen. BS present x 4.   : No CVAT BL  MSK: Left arm weakness left elbow effusion extremities: Arthritis skin: No rashes or bruises.   Neuro: Aox3. Cranial Nerves grossly intact. Motor/sensory wnl.   Psych: Mood wnl.        ROS  Constitutional: Denies fevers, chills, fatigue, weight loss/gain  HEENT: Denies HA, vision changes, hearing loss, sore throat  Cardiac: Denies CP, palpitations, edema  Respiratory: Denies SOB, cough, pleuritic chest pain, PND, orthopnea  GI: Denies N/V/D, abd pain, constipation, black/bloody stools  : Denies urinary changes, frequency, hematuria, urgency, retention, flank pain  MSK: Denies joint pain, joint swelling, back pain, neck pain, extremity pain  Neuro: Denies numbness, weakness, tingling    Immunization History   Administered Date(s) Administered    Influenza, High-dose Seasonal, Quadrivalent, Preservative Free 11/05/2020    Moderna SARS-CoV-2 Vaccination 03/03/2021, 04/07/2021, 11/19/2021    Pneumococcal Polysaccharide PPSV23 10/09/2018    Tdap 06/29/2022       Orders Only on 04/04/2023   Component Date Value Ref Range Status    Crystals Path Review 04/04/2023 SORAIDA   Final   Orders Only on 04/04/2023   Component Date Value Ref Range Status    Crystal Identification, Synovial F* 04/04/2023 SEE BELOW   Final       Radiology: Reviewed imaging in powerchart.  FL less than 1 hour intraoperative    Result Date: 3/14/2023  Interpreted By:  NHI GUADARRAMA MD " Elissa POLK DO MRN: [id number] Patient Name: DESTINY INFANTE  STUDY: FLUOROSCOPY, UP TO 1 HR;  3/13/2023 1:15 pm  INDICATION: concern for hemidiaphragm weakness .  COMPARISON: Chest radiograph 03/11/2023  ACCESSION NUMBER(S): [id number]  ORDERING CLINICIAN: MATHEW LÓPEZ  TECHNIQUE: Fluoroscopic support was given to neurology, provided by radiology, for this sniff test.   845 cine images were obtained per protocol. Fluoroscopic time was  1.2 minutes.  FINDINGS: The bilateral hemidiaphragms move symmetrically.. Correlation with real-time fluoroscopy and clinical history is recommended.      1. Status post diaphragm function test. 2. Findings as stated above. Please see neurology report for full details.  I personally reviewed the images/study and I agree with the findings as stated above by resident physician, Dr. Anderson Nguyen. The study was interpreted at [facility].    CT cervical spine wo IV contrast    Result Date: 3/12/2023  Interpreted By:  RAJI RODRIGUEZ MD and CONNIE CAMPUZANO MD MRN: [id number] Patient Name: DESTINY INFANTE  STUDY: CT C-SPINE WO CONTRAST;  3/11/2023 9:11 pm  INDICATION: wound dehiscence, eval for fluid collection, Lie Flat: Yes .  COMPARISON: MRI cervical spine dated 12/27/2022 CT cervical spine dated 12/24/2022  ACCESSION NUMBER(S): [id number]  ORDERING CLINICIAN: MATHEW LÓPEZ  TECHNIQUE: Axial noncontrast images of the cervical spine with coronal and sagittal reconstructed images.  FINDINGS: Evaluation of the soft tissues is limited secondary to lack of IV contrast.  PREVERTEBRAL SOFT TISSUES: There is a soft tissue defect of the posterior neck from C5-T1 measuring approximally 4.2 x 3.4 x 4.2 cm (series 303, image 72 and series 306, image 61). There are scattered hypoattenuating areas in the posterior neck around C3-C5 with scattered foci of air without definite evidence of a fluid collection. There is a small amount of subcutaneous emphysema  tracking posteriorly to the level of T1.  CRANIOCERVICAL JUNCTION: Intact.  ALIGNMENT:  No traumatic malalignment or traumatic facet widening.  VERTEBRAE:  No acute fracture. Vertebral body heights are maintained.  SPINAL CANAL/INTERVERTEBRAL DISCS: No high-grade spinal canal stenosis. No significant disc height loss. Status post C3-C6 laminectomies. There are linear lucencies within bilateral lamina and pedicles at multiple levels which may reflect tract from previous surgical hardware. Osseous fragment/bone cement is also noted surrounding bilateral C4-C6 facets.  NEURAL FORAMINA: No significant neural foraminal stenosis. Multilevel uncovertebral joint and facet arthropathy notably contribute to moderate neural foraminal stenosis at C3-C4, moderate bilateral neural foraminal stenosis at C4-C5, moderate bilateral neural foraminal stenosis at C5-C6.  OTHER: No significant abnormality.      1. Soft tissue dehiscence of the posterior neck from C5-T1 with multiple scattered areas of hypoattenuation areas in the posterior neck as well as multiple foci of air without definite evidence of a fluid collection, although evaluation is limited secondary to lack of IV contrast. 2. Status post laminectomies from C3-C6 levels.  I personally reviewed the images/study and I agree with the findings as stated. This study was interpreted at [place], [place].    XR chest 1 view    Result Date: 3/12/2023  Interpreted By:  ZACH JOE MD and ROSE CHRISTINE MD MRN: [id number] Patient Name: DESTINY INFANTE  STUDY: CHEST 1 VIEW;  3/11/2023 5:56 pm  INDICATION: preop .  COMPARISON: CT 12/25/2022  ACCESSION NUMBER(S): [id number]  ORDERING CLINICIAN: MATHEW LÓPEZ  FINDINGS: AP radiograph of the chest was provided.  CARDIOMEDIASTINAL SILHOUETTE: Cardiomediastinal silhouette is normal in size and configuration. Atherosclerotic calcification of the aortic knob.  LUNGS: Elevation of the left hemidiaphragm.  Hazy left greater than right bibasilar opacities. Additional hazy opacity projecting over the left mid lung. No right-sided pleural effusion. No pneumothorax.  ABDOMEN: No remarkable upper abdominal findings.  BONES: No acute osseous changes.      1. Elevation of the left hemidiaphragm with hazy left basilar opacity. Finding may represent a combination of prominent epicardial fat pad, small volume pleural effusion, and/or atelectasis. 2. Additional hazy opacity projecting over the left mid lung which may represent subsegmental atelectasis versus overlapping structures. 3. Mild right basilar hazy opacity suspected to represent atelectasis.  I personally reviewed the images/study and I agree with the findings as stated. This study was interpreted at [facility], [place], [place].      Family History   Problem Relation Name Age of Onset    Heart disease Father       Social History     Socioeconomic History    Marital status:      Spouse name: None    Number of children: None    Years of education: None    Highest education level: None   Occupational History    None   Tobacco Use    Smoking status: Former     Types: Cigarettes    Smokeless tobacco: Never   Vaping Use    Vaping status: None   Substance and Sexual Activity    Alcohol use: Never    Drug use: Never    Sexual activity: None   Other Topics Concern    None   Social History Narrative    None     Social Determinants of Health     Financial Resource Strain: Not on file   Food Insecurity: Not on file   Transportation Needs: Not on file   Physical Activity: Not on file   Stress: Not on file   Social Connections: Not on file   Intimate Partner Violence: Not on file   Housing Stability: Not on file     History reviewed. No pertinent past medical history.  Past Surgical History:   Procedure Laterality Date    CERVICAL FUSION  12/30/2022    HARDWARE REMOVAL  03/06/2023    WOUND DEBRIDEMENT       * Cannot find OR log *    Charting  was completed using voice recognition technology and may include unintended errors.     Patient was identified as a fall risk. Risk prevention instructions provided.

## 2023-04-10 NOTE — PATIENT INSTRUCTIONS

## 2023-04-11 ENCOUNTER — TELEPHONE (OUTPATIENT)
Dept: PRIMARY CARE | Facility: CLINIC | Age: 68
End: 2023-04-11
Payer: MEDICARE

## 2023-04-11 DIAGNOSIS — E55.9 VITAMIN D DEFICIENCY: ICD-10-CM

## 2023-04-11 LAB
ALANINE AMINOTRANSFERASE (SGPT) (U/L) IN SER/PLAS: 11 U/L (ref 10–52)
ALBUMIN (G/DL) IN SER/PLAS: 4.4 G/DL (ref 3.4–5)
ALKALINE PHOSPHATASE (U/L) IN SER/PLAS: 99 U/L (ref 33–136)
ANION GAP IN SER/PLAS: 14 MMOL/L (ref 10–20)
ASPARTATE AMINOTRANSFERASE (SGOT) (U/L) IN SER/PLAS: 14 U/L (ref 9–39)
BILIRUBIN TOTAL (MG/DL) IN SER/PLAS: 0.5 MG/DL (ref 0–1.2)
CALCIDIOL (25 OH VITAMIN D3) (NG/ML) IN SER/PLAS: 19 NG/ML
CALCIUM (MG/DL) IN SER/PLAS: 10 MG/DL (ref 8.6–10.6)
CARBON DIOXIDE, TOTAL (MMOL/L) IN SER/PLAS: 25 MMOL/L (ref 21–32)
CHLORIDE (MMOL/L) IN SER/PLAS: 100 MMOL/L (ref 98–107)
CHOLESTEROL (MG/DL) IN SER/PLAS: 225 MG/DL (ref 0–199)
CHOLESTEROL IN HDL (MG/DL) IN SER/PLAS: 59.1 MG/DL
CHOLESTEROL/HDL RATIO: 3.8
CREATININE (MG/DL) IN SER/PLAS: 1 MG/DL (ref 0.5–1.3)
ESTIMATED AVERAGE GLUCOSE FOR HBA1C: 88 MG/DL
GFR MALE: 82 ML/MIN/1.73M2
GLUCOSE (MG/DL) IN SER/PLAS: 96 MG/DL (ref 74–99)
HEMOGLOBIN A1C/HEMOGLOBIN TOTAL IN BLOOD: 4.7 %
LDL: 144 MG/DL (ref 0–99)
POTASSIUM (MMOL/L) IN SER/PLAS: 4.7 MMOL/L (ref 3.5–5.3)
PROSTATE SPECIFIC ANTIGEN,SCREEN: 0.33 NG/ML (ref 0–4)
PROTEIN TOTAL: 7.3 G/DL (ref 6.4–8.2)
RHEUMATOID FACTOR (IU/ML) IN SERUM OR PLASMA: 13 IU/ML (ref 0–15)
SEDIMENTATION RATE, ERYTHROCYTE: 30 MM/H (ref 0–20)
SODIUM (MMOL/L) IN SER/PLAS: 134 MMOL/L (ref 136–145)
THYROTROPIN (MIU/L) IN SER/PLAS BY DETECTION LIMIT <= 0.05 MIU/L: 0.73 MIU/L (ref 0.44–3.98)
TRIGLYCERIDE (MG/DL) IN SER/PLAS: 109 MG/DL (ref 0–149)
URATE (MG/DL) IN SER/PLAS: 4.5 MG/DL (ref 4–7.5)
UREA NITROGEN (MG/DL) IN SER/PLAS: 16 MG/DL (ref 6–23)
VLDL: 22 MG/DL (ref 0–40)

## 2023-04-11 NOTE — TELEPHONE ENCOUNTER
----- Message from Marcio Arias MD sent at 4/11/2023 11:27 AM EDT -----  Multiple abnormal test follow-up within a 4 weeks bring all medication with you

## 2023-04-11 NOTE — TELEPHONE ENCOUNTER
----- Message from Marcio Arias MD sent at 4/11/2023 10:11 AM EDT -----  Advice your vitamin D level is low take vitamin C calcium plus vitamin D 50,000 unit one every week for 3 months and repeat testing or 3 months anemia advised Slow Fe twice a day follow-up 3 months GI evaluation Dr. Ingram for colonoscopy for anemia

## 2023-04-11 NOTE — TELEPHONE ENCOUNTER
----- Message from Marcio Arias MD sent at 4/11/2023 11:27 AM EDT -----  Sed rate is high advised ibuprofen as needed

## 2023-04-12 LAB — ANTI-NUCLEAR ANTIBODY (ANA): NEGATIVE

## 2023-04-13 RX ORDER — ERGOCALCIFEROL 1.25 MG/1
50000 CAPSULE ORAL
Qty: 12 CAPSULE | Refills: 0 | Status: SHIPPED | OUTPATIENT
Start: 2023-04-13 | End: 2023-08-03 | Stop reason: SDUPTHER

## 2023-04-20 ENCOUNTER — TELEPHONE (OUTPATIENT)
Dept: PRIMARY CARE | Facility: CLINIC | Age: 68
End: 2023-04-20
Payer: MEDICARE

## 2023-05-23 ENCOUNTER — OFFICE VISIT (OUTPATIENT)
Dept: PRIMARY CARE | Facility: CLINIC | Age: 68
End: 2023-05-23
Payer: MEDICARE

## 2023-05-23 VITALS
DIASTOLIC BLOOD PRESSURE: 87 MMHG | SYSTOLIC BLOOD PRESSURE: 146 MMHG | TEMPERATURE: 97.8 F | BODY MASS INDEX: 26.36 KG/M2 | HEIGHT: 71 IN | HEART RATE: 103 BPM | OXYGEN SATURATION: 96 %

## 2023-05-23 DIAGNOSIS — D50.0 IRON DEFICIENCY ANEMIA DUE TO CHRONIC BLOOD LOSS: Primary | ICD-10-CM

## 2023-05-23 DIAGNOSIS — I10 PRIMARY HYPERTENSION: ICD-10-CM

## 2023-05-23 DIAGNOSIS — F10.10 ETOH ABUSE: ICD-10-CM

## 2023-05-23 DIAGNOSIS — E78.2 MIXED HYPERLIPIDEMIA: ICD-10-CM

## 2023-05-23 PROCEDURE — 1160F RVW MEDS BY RX/DR IN RCRD: CPT | Performed by: INTERNAL MEDICINE

## 2023-05-23 PROCEDURE — 99213 OFFICE O/P EST LOW 20 MIN: CPT | Performed by: INTERNAL MEDICINE

## 2023-05-23 PROCEDURE — 96372 THER/PROPH/DIAG INJ SC/IM: CPT | Performed by: INTERNAL MEDICINE

## 2023-05-23 PROCEDURE — 3077F SYST BP >= 140 MM HG: CPT | Performed by: INTERNAL MEDICINE

## 2023-05-23 PROCEDURE — 1159F MED LIST DOCD IN RCRD: CPT | Performed by: INTERNAL MEDICINE

## 2023-05-23 PROCEDURE — 3079F DIAST BP 80-89 MM HG: CPT | Performed by: INTERNAL MEDICINE

## 2023-05-23 PROCEDURE — 1036F TOBACCO NON-USER: CPT | Performed by: INTERNAL MEDICINE

## 2023-05-23 RX ORDER — CYANOCOBALAMIN 1000 UG/ML
1000 INJECTION, SOLUTION INTRAMUSCULAR; SUBCUTANEOUS ONCE
Status: COMPLETED | OUTPATIENT
Start: 2023-05-23 | End: 2023-05-23

## 2023-05-23 RX ADMIN — CYANOCOBALAMIN 1000 MCG: 1000 INJECTION, SOLUTION INTRAMUSCULAR; SUBCUTANEOUS at 13:29

## 2023-05-23 ASSESSMENT — PATIENT HEALTH QUESTIONNAIRE - PHQ9
SUM OF ALL RESPONSES TO PHQ9 QUESTIONS 1 AND 2: 0
1. LITTLE INTEREST OR PLEASURE IN DOING THINGS: NOT AT ALL
2. FEELING DOWN, DEPRESSED OR HOPELESS: NOT AT ALL

## 2023-05-23 NOTE — PROGRESS NOTES
Subjective   Patient ID: Yoan Sharp is a 67 y.o. male who presents for Follow-up (6 weeks/Fatigue /Should he still take Clonidine? ).    Assessment/Plan     Problem List Items Addressed This Visit          Circulatory    Hypertension     Patients BP readings reviewed and addressed, as we age our arteries turn stiffer and less elastic. Restricting salt consumption and staying physically fit with regular exercise regimen is the only way to keep our vasculature less tonic. Studies have shown that keeping ideal body wt, exercise routine about 140 to 150 minutes a week, eating variety of plant based diet and drinking plentiful water are quite helpful. Monitor BP twice or once a week at home and bring log to be reviewed by me. Uncontrolled BP has long term consequences including heart failure, myocardial infarction, accelerated atherosclerosis and kidney dysfunction. Therapy reviewed and explained.              Hematologic    Iron deficiency anemia due to chronic blood loss - Primary       Nightly B12 1 cc intramuscular iron 2 cc intramuscular refer to GI            Other    ETOH abuse     Check LFT magnesium level advised to check B12 folic acid thiamine joint AAA breakthrough program         Hyperlipidemia     Diet exercise plus statin therapy            HPI  Complaining of the neck pain back pain fatigue tired weakness extremely fatigued since taking the medicine clonidine patient is alcoholic counseling helping the symptoms and blood pressure both found out have anemia given iron B12 injection advised B12 folic acid thiamine and join the alcohol Anonymous group    No headache no chest pain no jaundice    No suicide        Allergies   Allergen Reactions    Iodine Unknown    Lisinopril Swelling and Unknown     Swelling of the face    Shellfish Containing Products Unknown and Other       Current Outpatient Medications   Medication Sig Dispense Refill    acetaminophen (Tylenol) 325 mg tablet Take by mouth every 6  "hours if needed for mild pain (1 - 3).      allopurinol (Zyloprim) 100 mg tablet Take 1 tablet (100 mg) by mouth in the morning and 1 tablet (100 mg) before bedtime. 60 tablet 11    amLODIPine (Norvasc) 5 mg tablet Take 1 tablet (5 mg) by mouth once daily. 90 tablet 3    atorvastatin (Lipitor) 20 mg tablet Take 1 tablet (20 mg) by mouth once daily. 90 tablet 3    cholecalciferol (Vitamin D-3) 25 MCG (1000 UT) capsule Take by mouth.      cloNIDine (Catapres) 0.1 mg tablet Take by mouth twice a day.      cyclobenzaprine (Flexeril) 10 mg tablet Take by mouth 3 times a day as needed.      cyclobenzaprine (Flexeril) 10 mg tablet Take 1 tablet (10 mg) by mouth 2 times a day as needed for muscle spasms. 60 tablet 0    ergocalciferol (Vitamin D-2) 1.25 MG (71809 UT) capsule Take 1 capsule (50,000 Units) by mouth 1 (one) time per week. 12 capsule 0    folic acid (Folvite) 1 mg tablet Take 1 tablet (1 mg) by mouth once daily. 90 tablet 3    gabapentin (Neurontin) 300 mg capsule Take 1 capsule (300 mg) by mouth once daily in the morning AND 2 capsules (600 mg) once daily in the evening. 270 capsule 3    losartan (Cozaar) 100 mg tablet Take 1 tablet (100 mg) by mouth once daily. 90 tablet 3    meloxicam (Mobic) 15 mg tablet Take 1 tablet (15 mg) by mouth once daily. 30 tablet 11    multivitamin tablet Take 1 tablet by mouth once daily.      oxyCODONE (Roxicodone) 5 mg immediate release tablet TAKE 1 TABLET BY MOUTH EVERY 6 HOURS AS NEEDED FOR MODERATE TO SEVERE POST-OP PAIN      Stimulant Laxative Plus 8.6-50 mg tablet       thiamine 100 mg tablet Take 1 tablet (100 mg) by mouth once daily.       No current facility-administered medications for this visit.       Objective   Visit Vitals  /87 (BP Location: Left arm, Patient Position: Sitting, BP Cuff Size: Adult)   Pulse 103   Temp 36.6 °C (97.8 °F)   Ht 1.803 m (5' 11\")   SpO2 96%   BMI 26.36 kg/m²   Smoking Status Former   BSA 2.07 m²     Physical Exam  Constitutional:  "      General: He is not in acute distress.     Appearance: Normal appearance.   HENT:      Head: Normocephalic and atraumatic.      Nose: Nose normal.   Eyes:      Extraocular Movements: Extraocular movements intact.      Conjunctiva/sclera: Conjunctivae normal.   Cardiovascular:   Heart murmur   pulmonary:      Crackles rhonchi heart murmur  Skin:     General: Skin is warm.   Neurological:      Mental Status: He is alert and oriented to person, place, and time.   Psychiatric:         Mood and Affect: Mood normal.         Behavior: Behavior normal.   Musculoskeletal neck tenderness and pain  Immunization History   Administered Date(s) Administered    Influenza, High-dose Seasonal, Quadrivalent, Preservative Free 11/05/2020    Moderna SARS-CoV-2 Vaccination 03/03/2021, 04/07/2021, 11/19/2021    Pneumococcal Polysaccharide PPSV23 10/09/2018    Tdap 06/29/2022           Lab on 04/10/2023   Component Date Value Ref Range Status    Uric Acid 04/10/2023 4.5  4.0 - 7.5 mg/dL Final    Rheumatoid Factor 04/10/2023 13  0 - 15 IU/mL Final    ANTI-NUCLEAR ANTIBODY (TEZ) 04/10/2023 NEGATIVE  NEGATIVE Final    Sedimentation Rate 04/10/2023 30 (H)  0 - 20 mm/h Final   Orders Only on 04/04/2023   Component Date Value Ref Range Status    Crystals Path Review 04/04/2023 SORAIDA   Final   Orders Only on 04/04/2023   Component Date Value Ref Range Status    Crystal Identification, Synovial F* 04/04/2023 SEE BELOW   Final   Office Visit on 04/04/2023   Component Date Value Ref Range Status    WBC 04/10/2023 7.7  4.4 - 11.3 x10E9/L Final    nRBC 04/10/2023 0.0  0.0 - 0.0 /100 WBC Final    RBC 04/10/2023 4.01 (L)  4.50 - 5.90 x10E12/L Final    Hemoglobin 04/10/2023 11.6 (L)  13.5 - 17.5 g/dL Final    Hematocrit 04/10/2023 36.0 (L)  41.0 - 52.0 % Final    MCV 04/10/2023 90  80 - 100 fL Final    MCHC 04/10/2023 32.2  32.0 - 36.0 g/dL Final    Platelets 04/10/2023 273  150 - 450 x10E9/L Final    RDW 04/10/2023 13.5  11.5 - 14.5 % Final     Glucose 04/10/2023 96  74 - 99 mg/dL Final    Sodium 04/10/2023 134 (L)  136 - 145 mmol/L Final    Potassium 04/10/2023 4.7  3.5 - 5.3 mmol/L Final    Chloride 04/10/2023 100  98 - 107 mmol/L Final    Bicarbonate 04/10/2023 25  21 - 32 mmol/L Final    Anion Gap 04/10/2023 14  10 - 20 mmol/L Final    Urea Nitrogen 04/10/2023 16  6 - 23 mg/dL Final    Creatinine 04/10/2023 1.00  0.50 - 1.30 mg/dL Final    GFR MALE 04/10/2023 82  >90 mL/min/1.73m2 Final    Calcium 04/10/2023 10.0  8.6 - 10.6 mg/dL Final    Albumin 04/10/2023 4.4  3.4 - 5.0 g/dL Final    Alkaline Phosphatase 04/10/2023 99  33 - 136 U/L Final    Total Protein 04/10/2023 7.3  6.4 - 8.2 g/dL Final    AST 04/10/2023 14  9 - 39 U/L Final    Total Bilirubin 04/10/2023 0.5  0.0 - 1.2 mg/dL Final    ALT (SGPT) 04/10/2023 11  10 - 52 U/L Final    Cholesterol 04/10/2023 225 (H)  0 - 199 mg/dL Final    HDL 04/10/2023 59.1  mg/dL Final    Cholesterol/HDL Ratio 04/10/2023 3.8   Final    LDL 04/10/2023 144 (H)  0 - 99 mg/dL Final    VLDL 04/10/2023 22  0 - 40 mg/dL Final    Triglycerides 04/10/2023 109  0 - 149 mg/dL Final    TSH 04/10/2023 0.73  0.44 - 3.98 mIU/L Final    Vitamin D, 25-Hydroxy 04/10/2023 19 (A)  ng/mL Final    Prostate Specific Antigen,Screen 04/10/2023 0.33  0.00 - 4.00 ng/mL Final    Hemoglobin A1C 04/10/2023 4.7  % Final    Estimated Average Glucose 04/10/2023 88  MG/DL Final       Radiology: Reviewed imaging in powerchart.  XR shoulder left 2+ views    Result Date: 4/26/2023  PERFORMED AT [facility] LOCATION:[id number]    XR KNEE STANDING AP BILATERAL    Result Date: 4/26/2023  PERFORMED AT [facility] LOCATION:[id number]      Family History   Problem Relation Name Age of Onset    Heart disease Father       Social History     Socioeconomic History    Marital status:      Spouse name: None    Number of children: None    Years of education: None    Highest education level: None   Occupational History    None   Tobacco Use    Smoking status: Former     Types:  Cigarettes    Smokeless tobacco: Never   Vaping Use    Vaping status: None   Substance and Sexual Activity    Alcohol use: Never    Drug use: Never    Sexual activity: None   Other Topics Concern    None   Social History Narrative    None     Social Determinants of Health     Financial Resource Strain: Not on file   Food Insecurity: Not on file   Transportation Needs: Not on file   Physical Activity: Not on file   Stress: Not on file   Social Connections: Not on file   Intimate Partner Violence: Not on file   Housing Stability: Not on file     History reviewed. No pertinent past medical history.  Past Surgical History:   Procedure Laterality Date    CERVICAL FUSION  12/30/2022    HARDWARE REMOVAL  03/06/2023    WOUND DEBRIDEMENT         Charting was completed using voice recognition technology and may include unintended errors.

## 2023-08-03 DIAGNOSIS — E55.9 VITAMIN D DEFICIENCY: ICD-10-CM

## 2023-08-03 RX ORDER — ERGOCALCIFEROL 1.25 MG/1
50000 CAPSULE ORAL
Qty: 12 CAPSULE | Refills: 0 | Status: SHIPPED | OUTPATIENT
Start: 2023-08-03 | End: 2023-11-30 | Stop reason: WASHOUT

## 2023-08-24 ENCOUNTER — OFFICE VISIT (OUTPATIENT)
Dept: PRIMARY CARE | Facility: CLINIC | Age: 68
End: 2023-08-24
Payer: MEDICARE

## 2023-08-24 VITALS
OXYGEN SATURATION: 95 % | HEART RATE: 98 BPM | TEMPERATURE: 98.3 F | SYSTOLIC BLOOD PRESSURE: 146 MMHG | BODY MASS INDEX: 26.88 KG/M2 | WEIGHT: 192 LBS | HEIGHT: 71 IN | DIASTOLIC BLOOD PRESSURE: 68 MMHG

## 2023-08-24 DIAGNOSIS — I10 PRIMARY HYPERTENSION: ICD-10-CM

## 2023-08-24 DIAGNOSIS — F10.10 ALCOHOL ABUSE: ICD-10-CM

## 2023-08-24 DIAGNOSIS — D50.0 IRON DEFICIENCY ANEMIA DUE TO CHRONIC BLOOD LOSS: ICD-10-CM

## 2023-08-24 DIAGNOSIS — Z12.11 COLON CANCER SCREENING: ICD-10-CM

## 2023-08-24 DIAGNOSIS — G62.9 NEUROPATHY: ICD-10-CM

## 2023-08-24 DIAGNOSIS — I10 BENIGN ESSENTIAL HYPERTENSION: Primary | ICD-10-CM

## 2023-08-24 DIAGNOSIS — E55.9 VITAMIN D DEFICIENCY: ICD-10-CM

## 2023-08-24 DIAGNOSIS — E78.2 HYPERLIPEMIA, MIXED: ICD-10-CM

## 2023-08-24 DIAGNOSIS — Z13.6 SCREENING FOR ABDOMINAL AORTIC ANEURYSM: ICD-10-CM

## 2023-08-24 PROBLEM — E78.5 HYPERLIPIDEMIA: Status: RESOLVED | Noted: 2023-04-04 | Resolved: 2023-08-24

## 2023-08-24 PROCEDURE — 3078F DIAST BP <80 MM HG: CPT | Performed by: INTERNAL MEDICINE

## 2023-08-24 PROCEDURE — 3077F SYST BP >= 140 MM HG: CPT | Performed by: INTERNAL MEDICINE

## 2023-08-24 PROCEDURE — 96372 THER/PROPH/DIAG INJ SC/IM: CPT | Performed by: INTERNAL MEDICINE

## 2023-08-24 PROCEDURE — 1160F RVW MEDS BY RX/DR IN RCRD: CPT | Performed by: INTERNAL MEDICINE

## 2023-08-24 PROCEDURE — 1036F TOBACCO NON-USER: CPT | Performed by: INTERNAL MEDICINE

## 2023-08-24 PROCEDURE — 1159F MED LIST DOCD IN RCRD: CPT | Performed by: INTERNAL MEDICINE

## 2023-08-24 PROCEDURE — 1125F AMNT PAIN NOTED PAIN PRSNT: CPT | Performed by: INTERNAL MEDICINE

## 2023-08-24 PROCEDURE — 99214 OFFICE O/P EST MOD 30 MIN: CPT | Performed by: INTERNAL MEDICINE

## 2023-08-24 RX ORDER — CYANOCOBALAMIN 1000 UG/ML
1000 INJECTION, SOLUTION INTRAMUSCULAR; SUBCUTANEOUS ONCE
Status: COMPLETED | OUTPATIENT
Start: 2023-08-24 | End: 2023-08-24

## 2023-08-24 RX ADMIN — CYANOCOBALAMIN 1000 MCG: 1000 INJECTION, SOLUTION INTRAMUSCULAR; SUBCUTANEOUS at 11:12

## 2023-08-24 NOTE — PROGRESS NOTES
Subjective   Patient ID: Yoan Sharp is a 68 y.o. male who presents for Follow-up (3 month ).    Assessment/Plan     Problem List Items Addressed This Visit       Alcohol abuse     Bedrest folic acid thiamine         Relevant Orders    Vitamin D 25-Hydroxy,Total    Lipid Panel    Iron and TIBC    CBC and Auto Differential    Cologuard® colon cancer screening    Hyperlipemia, mixed    Benign essential hypertension - Primary     Patients BP readings reviewed and addressed, as we age our arteries turn stiffer and less elastic. Restricting salt consumption and staying physically fit with regular exercise regimen is the only way to keep our vasculature less tonic. Studies have shown that keeping ideal body wt, exercise routine about 140 to 150 minutes a week, eating variety of plant based diet and drinking plentiful water are quite helpful. Monitor BP twice or once a week at home and bring log to be reviewed by me. Uncontrolled BP has long term consequences including heart failure, myocardial infarction, accelerated atherosclerosis and kidney dysfunction. Therapy reviewed and explained.           Iron deficiency anemia due to chronic blood loss    Relevant Medications    cyanocobalamin (Vitamin B-12) injection 1,000 mcg (Completed)    Other Relevant Orders    Vitamin D 25-Hydroxy,Total    Lipid Panel    Iron and TIBC    CBC and Auto Differential    Cologuard® colon cancer screening    Colon cancer screening    Relevant Orders    Vitamin D 25-Hydroxy,Total    Lipid Panel    Iron and TIBC    CBC and Auto Differential    Cologuard® colon cancer screening    Vitamin D deficiency     Advice your vitamin D level is low take vitamin C calcium plus vitamin D 50,000 unit one every week for 3 months and repeat testing or 3 months         Relevant Orders    Vitamin D 25-Hydroxy,Total    Lipid Panel    Iron and TIBC    CBC and Auto Differential    Cologuard® colon cancer screening    Neuropathy     Patient was evaluated today,  problem list was reviewed, problems and concerns addressed, Rx list reviewed and updated, lab and tests were noted and reviewed. Life style changes were discussed, always it works better if we eat plant based diet and plenty of fibres and roughage. Consume adequate amount of water and avoid alcohol, light to moderate physical activities and stress reduction are always beneficial for ongoing physical well being. Do not forget to have 6 to 7 hours of sleep regularly and avoid late night wilmer screen exposure.    HPI  68-year-old patient of osteoarthritis gouty arthritis hypertension hyperlipidemia obesity moderate alcohol intake complaining arthralgia myalgia fatigue tired weakness history of the smoking chronic fatigue syndrome    Negative for hematuria rectal bleeding jaundice or depression or fall    Advised to get a thyroid abdominal aortic aneurysm colon cancer screening glaucoma screening osteoporosis screening review laboratory medication independently discussed with the patient follow-up in 3 to 4 months for annual medical wellness  History reviewed. No pertinent past medical history.  Past Surgical History:   Procedure Laterality Date    CERVICAL FUSION  12/30/2022    HARDWARE REMOVAL  03/06/2023    WOUND DEBRIDEMENT       Allergies   Allergen Reactions    Iodine Unknown    Lisinopril Swelling and Unknown     Swelling of the face    Shellfish Containing Products Unknown and Other     Current Outpatient Medications   Medication Sig Dispense Refill    acetaminophen (Tylenol) 325 mg tablet Take by mouth every 6 hours if needed for mild pain (1 - 3).      allopurinol (Zyloprim) 100 mg tablet Take 1 tablet (100 mg) by mouth in the morning and 1 tablet (100 mg) before bedtime. 60 tablet 11    amLODIPine (Norvasc) 5 mg tablet Take 1 tablet (5 mg) by mouth once daily. 90 tablet 3    atorvastatin (Lipitor) 20 mg tablet Take 1 tablet (20 mg) by mouth once daily. 90 tablet 3    cholecalciferol (Vitamin D-3) 25 MCG (1000  UT) capsule Take by mouth.      cloNIDine (Catapres) 0.1 mg tablet Take by mouth twice a day.      cyclobenzaprine (Flexeril) 10 mg tablet Take 1 tablet (10 mg) by mouth 2 times a day as needed for muscle spasms. 60 tablet 0    ergocalciferol (Vitamin D-2) 1.25 MG (82564 UT) capsule Take 1 capsule (50,000 Units) by mouth 1 (one) time per week. 12 capsule 0    folic acid (Folvite) 1 mg tablet Take 1 tablet (1 mg) by mouth once daily. 90 tablet 3    gabapentin (Neurontin) 300 mg capsule Take 1 capsule (300 mg) by mouth once daily in the morning AND 2 capsules (600 mg) once daily in the evening. 270 capsule 3    losartan (Cozaar) 100 mg tablet Take 1 tablet (100 mg) by mouth once daily. 90 tablet 3    meloxicam (Mobic) 15 mg tablet Take 1 tablet (15 mg) by mouth once daily. 30 tablet 11    thiamine 100 mg tablet Take 1 tablet (100 mg) by mouth once daily.       No current facility-administered medications for this visit.     Family History   Problem Relation Name Age of Onset    Heart disease Father       Social History     Socioeconomic History    Marital status:      Spouse name: None    Number of children: None    Years of education: None    Highest education level: None   Occupational History    None   Tobacco Use    Smoking status: Former     Types: Cigarettes    Smokeless tobacco: Never   Substance and Sexual Activity    Alcohol use: Never    Drug use: Never    Sexual activity: None   Other Topics Concern    None   Social History Narrative    None     Social Determinants of Health     Financial Resource Strain: Not on file   Food Insecurity: Not on file   Transportation Needs: Not on file   Physical Activity: Not on file   Stress: Not on file   Social Connections: Not on file   Intimate Partner Violence: Not on file   Housing Stability: Not on file     Immunization History   Administered Date(s) Administered    Flu vaccine, quadrivalent, high-dose, preservative free, age 65y+ (FLUZONE) 11/05/2020     "Influenza, Unspecified 11/05/2020    Moderna SARS-CoV-2 Vaccination 03/03/2021, 04/07/2021, 11/19/2021    Pneumococcal polysaccharide vaccine, 23-valent, age 2 years and older (PNEUMOVAX 23) 10/09/2018    Tdap vaccine, age 10 years and older (BOOSTRIX) 06/29/2022       Review of Systems  Review of systems is otherwise negative unless stated above or in history of present illness.    Objective   Visit Vitals  /68 (BP Location: Right arm, Patient Position: Sitting, BP Cuff Size: Large adult)   Pulse 98   Temp 36.8 °C (98.3 °F)   Ht 1.803 m (5' 11\")   Wt 87.1 kg (192 lb)   SpO2 95%   BMI 26.78 kg/m²   Smoking Status Former   BSA 2.09 m²     Physical Exam  Constitutional:       General: not in acute distress.  Anxiety obesity   HENT:      Head: Normocephalic and atraumatic.      Nose: Nose normal.   Eyes:      Extraocular Movements: Extraocular movements intact.      Conjunctiva/sclera: Conjunctivae normal.   Cardiovascular: S1-S2 S4     Rate and Rhythm: Normal rate ,  No M/R/G  Pulmonary:      Effort: Pulmonary effort is normal.      Breath sounds: Normal, Bilat Equal AE  Skin:     General: Skin is warm.   Neurological: Chemical neuropathy     Mental Status: He is alert and oriented to person, place, and time.   Psychiatric:    Anxiety     Mood and Affect: Mood normal.         Behavior: Behavior normal.   Musculoskeletal osteoarthritis gouty arthritis  FROM in all extremitirs,  Joint-no swelling or tenderness    No visits with results within 4 Month(s) from this visit.   Latest known visit with results is:   Lab on 04/10/2023   Component Date Value Ref Range Status    Uric Acid 04/10/2023 4.5  4.0 - 7.5 mg/dL Final    Rheumatoid Factor 04/10/2023 13  0 - 15 IU/mL Final    ANTI-NUCLEAR ANTIBODY (TEZ) 04/10/2023 NEGATIVE  NEGATIVE Final    Sedimentation Rate 04/10/2023 30 (H)  0 - 20 mm/h Final       Radiology: Reviewed imaging in powerchart.  No results found.      Charting was completed using voice recognition " technology and may include unintended errors.

## 2023-09-14 NOTE — PROGRESS NOTES
Service: Neurosurgery     Subjective Data:   DESTINY INFANTE is a 67 year old Male who is Hospital Day # 6 and POD #2 for 1. Excision and debridement down to and including spinous process bone;2. Paraspinous muscle flap;3. Trapezius muscle flap;4.  Complex closure;5. Refer to neurosurgery note.    Objective Data:     Objective Information:      T   P  R  BP   MAP  SpO2   Value  36.9  93  18  124/59      95%  Date/Time 3/16 4:13 3/16 4:13 3/16 4:13 3/16 4:13    3/16 4:13  Range  (36.9C - 38.1C )  (77 - 108 )  (17 - 20 )  (120 - 160 )/ (59 - 85 )    (94% - 96% )  Highest temp of 38.1 C was recorded at 3/15 15:59      Pain reported at 3/15 21:53: 6 = Moderate    ---- Intake and Output  -----  Mn/Dy/Year Time  Intake   Output  Net  Mar 16, 2023 6:00 am  0   625  -625  Mar 15, 2023 10:00 pm  200   510  -310  Mar 15, 2023 2:00 pm  120   135  -15    The Intake and Output Totals for the last 24 hours are:      Intake   Output  Net      320   1270  -950    Physical Exam by System:    Neurological: AOx3  L D/B/T 0 HG IO 5  RUE/BLE 5     Recent Lab Results:    Results:    CBC: 3/16/2023 04:54              \     Hgb     /                              \     8.8 L    /  WBC  ----------------  Plt               9.1       ----------------    286              /     Hct     \                              /     26.3 L    \            RBC: 3.00 L    MCV: 88         Assessment and Plan:   Comorbidities:  ·  Comorbidity anemia   ·  Anemia acute blood loss anemia     Code Status:  ·  Code Status Full Code     Assessment:    67 M h/o HTN, EtOH abuse, cervical myelopathy 12/29 s/p C3-T1 PCDF (DD) c/b C5 palsy c/b hardware pullout 3/6 s/p hardware removal, revision C3 lami (JA), p/w wound  breakdown, CXR L hemidiaphragm elevation, CT CS neg for fluid collection  3/12 s/p wound washout, wound vac placement by plastics  3/14 s/p spinous process removal, closure by plastics    Plan:  floor  drains/prevena per plastics  maintain c  collar  appreciate ID recs  BCX, OR Cx      Attestation:   Note Completion:  I am a:  Resident/Fellow   Attending Attestation I saw and evaluated the patient.  I personally obtained the key and critical portions of the history and physical exam or was physically present for key and  critical portions performed by the resident/fellow. I reviewed the resident/fellow?s documentation and discussed the patient with the resident/fellow.  I agree with the resident/fellow?s medical decision making as documented in the note.     I personally evaluated the patient on 16-Mar-2023         Electronic Signatures:  Wilberto Hunt (Resident))  (Signed 16-Mar-2023 07:35)   Authored: Service, Subjective Data, Objective Data, Assessment  and Plan, Note Completion  Ulysses Mesa)  (Signed 16-Mar-2023 07:40)   Authored: Assessment and Plan, Note Completion   Co-Signer: Service, Subjective Data, Objective Data, Assessment and Plan, Note Completion      Last Updated: 16-Mar-2023 07:40 by Ulysses Mesa)

## 2023-09-14 NOTE — PROGRESS NOTES
Service: Plastic Surgery     Subjective Data:   DESTINY INFANTE is a 67 year old Male who is Hospital Day #6 and POD #2 for 1. Excision and debridement down to and including spinous process bone; 2. Paraspinous muscle flap; 3. Trapezius muscle flap;  4. Complex closure; 5. Refer to neurosurgery note.     Resting upright in bed on assessment, no acute concerns. Continues to experience postsurgical pain to his cervical spinal region and upper back with is marginally improved with pain medications, slightly  better today. Better tolerability of diet in interim of last assessment, had dinner yesterday and ordered breakfast this AM. He is ambulating and voiding independently. Denies headache, dizziness, fever, chills, chest pain, dyspnea, abdominal pain, nausea,  vomiting or extremity numbness/tingling.    Overnight Events: Patient had an uneventful night.   Additional Information:    Incisional wound vac dressing intact to midline cervical spine, w/o issue overnight. Maintaining continuous suction at 125 mmHg without alarm, leak, obstruction or  malfunction. x3 TERRENCE drains intact to b/l cervical paraspinal region w/o issue overnight.    Objective Data:     Objective Information:      T   P  R  BP   MAP  SpO2   Value  36.2  73  16  139/77      92%  Date/Time 3/16 8:11 3/16 8:11 3/16 8:11 3/16 8:11    3/16 8:11  Range  (36.2C - 38.1C )  (73 - 108 )  (16 - 20 )  (120 - 160 )/ (59 - 85 )    (92% - 96% )    Highest temp of 38.1 C was recorded at 3/15 15:59    Pain reported at 3/16 8:36: 6 = Moderate    ---- Intake and Output  -----  Mn/Dy/Year Time  Intake   Output  Net  Mar 16, 2023 6:00 am  0   625  -625  Mar 15, 2023 10:00 pm  200   510  -310  Mar 15, 2023 2:00 pm  120   135  -15    The Intake and Output Totals for the last 24 hours are:      Intake   Output  Net      320   1270  -950    Physical Exam by System:    Constitutional: A&Ox3, NAD, resting upright in bed  with cervical collar in place.   Eyes: EOMI, clear  sclera.   ENMT: MMM.   Head/Neck: C-collar in place. Incisional wound vac  dressing intact to midline cervical spine surgical incision/site without issue. Maintaining continuous suction at 125 mmHg without alarm, leak, obstruction or malfunction. To hospital vac system with no output appreciated in collecting canister. x3 TERRENCE  drains (2 at R cervical paraspinal region and 1 at L cervical paraspinal region), intact and with serosanguinous drainage.   Respiratory/Thorax: Unlabored respirations on RA.   Cardiovascular: Extremities warm, well perfused.   Gastrointestinal: Soft, ND/NT.   Genitourinary: Voiding independently.   Extremities: No peripheral edema.   Neurological: Alert and oriented x3.   Psychological: Appropriate mood and behavior.   Skin: Warm and dry, no lesions, no rashes.     Medication:    Medications:        Continuous Medications       --------------------------------  No continuous medications are active       Scheduled Medications       --------------------------------  1. amLODIPine (NORVASC):  5  mg  Oral  Daily    2. Cefepime IV Piggy Back:  2  gram(s)  IntraVenous Piggyback  Every 8 Hours    3. Cholecalciferol (Vitamin D3):  1000  International Unit(s)  Oral  Daily    4. Cyanocobalamin:  1000  microgram(s)  Oral  Daily    5. Docusate:  100  mg  Oral  2 Times a Day    6. Famotidine:  20  mg  Oral  2 Times a Day    7. Folic Acid:  1  mg  Oral  Daily    8. Gabapentin:  300  mg  Oral  3 Times a Day    9. Heparin SubCutaneous:  5000  unit(s)  SubCutaneous  Every 8 Hours    10. Multivitamin with Minerals:  1  tablet(s)  Oral  Daily    11. Nicotine 21 mg/ 24 hour TransDermal:  1  patch  TransDermal  Every 24 Hours    12. Sennosides:  2  tablet(s)  Oral  Daily    13. Thiamine:  100  mg  Oral  Daily    14. Vancomycin - RPh to Dose - IV Piggy Back:  1  each  As Specified  Variable    15. Vancomycin - RPh to Dose - IV Piggy Back:  1  each  As Specified  Variable    16. Vancomycin IV Piggy Back:  1250  mg   IntraVenous Piggyback  Every 12 Hours         PRN Medications       --------------------------------  1. Acetaminophen:  650  mg  Oral  Every 4 Hours    2. Bisacodyl Rectal:  10  mg  Rectal  Daily    3. Cyclobenzaprine:  10  mg  Oral  3 Times a Day    4. Fleet Adult (Sodium Phosphate) Rectal:  1  enema  Rectal  Daily    5. HYDROmorphone Injectable:  0.2  mg  IntraVenous Push  Every 4 Hours    6. Ondansetron Injectable:  4  mg  IntraVenous Push  Every 6 Hours    7. oxyCODONE Immediate Release:  5  mg  Oral  Every 4 Hours    8. oxyCODONE Immediate Release:  10  mg  Oral  Every 4 Hours    9. Polyethylene Glycol:  17  gram(s)  Oral  Daily        Recent Lab Results:    Results:    I have reviewed these laboratory results:    Renal Function Panel  16-Mar-2023 04:54:00      Result Value    Glucose, Serum  109   H   NA  132   L   K  4.0    CL  98    Bicarbonate, Serum  23    Anion Gap, Serum  15    BUN  14    CREAT  0.86    GFR Male  >90    Calcium, Serum  9.1    Phosphorus, Serum  3.5    ALB  3.0   L     Complete Blood Count  16-Mar-2023 04:54:00      Result Value    White Blood Cell Count  9.1    Nucleated Erythrocyte Count  0.0    Red Blood Cell Count  3.00   L   HGB  8.8   L   HCT  26.3   L   MCV  88    MCHC  33.5    PLT  286    RDW-CV  12.8      Culture, Miscellaneous, includes Gram Stain  Trending View      Result 14-Mar-2023 13:23:00  14-Mar-2023 13:22:00    Gram Stain NO GRANULOCYTES OR ORGANISMS SEEN.   2+ GRANULOCYTES. NO ORGANISMS SEEN.    Culture, Miscellaneous, includes Gram Stain NO GROWTH, CULTURE IN PROGRESS.   NO GROWTH, CULTURE IN PROGRESS.          Assessment and Plan:   Code Status:  ·  Code Status Full Code     Assessment:    DESTINY INFANTE is a 67 year old Male with PMHx significant for HTN, EtOH abuse, and cervical myelopathy s/p fall in Dec 2022. On 12/29/22, patient underwent C3-T1  PCDF with Dr. Bryant and unfortunately suffered from C5 palsy post-operatively and has had LUE motor weakness every  since. On 3/6, patient had hardware removal and revision C3 laminectomy with Dr. Ellis and re-presented 3/11 with wound dehiscence.  Plastic surgery consulted for assistance with wound closure. Patient now s/p cervical spinal wound I&D with placement of wound vac on 3/12 (per NSGY) plus removal and debridement of spinous process (per NSGY), cervical wound I&D, paraspinous + trapezius  b/l muscle flaps, complex closure and application of incisional wound vac (per plastics) on 3/14.     OR course (this admission):   3/12/23 - Cervical spinal wound I&D with placement of wound vac (per NSGY)   3/14/23 - Removal and debridement of spinous process (per NSGY), cervical wound I&D, paraspinous + trapezius b/l muscle flaps, complex closure and application of incisional wound vac (per plastics)     Plan/Recommendations:  - Continue incisional wound vac therapy to cervical spinal incision/surgical site x10 days postoperatively      ·  Settings: 125 mmHg low continuous suction      ·  Please keep dressing C/D/I, reinforce PRN      ·  Record vac output quality and quantity per nursing q8h      ·  Please alert plastics team with any concerns regarding vac   - OK for DC with wound vac in place with removal upon outpt f/u with plastics 1 week post DC    (transition to portable Prevena vac today in preparation of anticipated DC)   - Continue TERRENCE drain care to x3 drains present at b/l cervical spinal region      ·  Strip drain tubing TID and PRN     ·  Monitor and record output q8h      ·  Keep drain sites C/D/I with daily drain dressing changes      ·  Removal per plastic surgery team when output <30 cc/24 hrs for 2 consecutive days      ·  OK for DC with drains in place with removal upon outpt f/u with plastics   - OR cx hx as follows:      ·  3/12 subfascial: No growth aerobically or anaerobically (final 3/14)      ·  3/12 suprafascial: No growth aerobically or anaerobically (final 3/14)      ·  3/14 cervical bone: NGTD (pending  finalization)      ·  3/14 cervical tissue: NGTD (pending finalization)   - Continue IV ABX per ID recs/primary (currently IV cefepime, vanc)   - OK for DC from plastic surgery standpoint, final disposition per primary when medically clear     -> Currently pending finalized ID recs/cx results  - Outpt f/u with plastics requested for 1 week post DC (appt info and vac/drain care info added to DP2)   - Appreciate remaining supportive care per primary service   - Plastic surgery will continue to follow postoperatively while patient in house     Patient and plan discussed with Dr. Sanchez.     Malia Ricks PA-C  Plastic and Reconstructive Surgery   [facility]  Pager #[phone]  Team phones: [phone]     Time spent on the assessment of patient, gathering and interpreting data, review of medical record/patient history, personally reviewing radiographic imaging and formulation of this note 35 minutes. With greater than 50% spent in personal discussion with  patient.     Plan of Care Reviewed With:  Plan of Care Reviewed With: patient       Electronic Signatures:  Malia Ricks (PAC)  (Signed 16-Mar-2023 10:26)   Authored: Service, Subjective Data, Objective Data, Assessment  and Plan, Note Completion      Last Updated: 16-Mar-2023 10:26 by Malia Ricks (PAC)

## 2023-09-14 NOTE — PROGRESS NOTES
Service: Plastic Surgery     Subjective Data:   DESTINY INFANTE is a 67 year old Male who is Hospital Day # 3 and POD #0 for 1. Wound Washout and Vac Placement ONLY;     Patient resting in bed without acute complaints. Pain well controlled. Veraflow vac intact without issue overnight. Pt aware of plans for OR tomorrow as add on for cervical washout and potential closure.  Denies fever, chest pain, SOB, abd pain, n/v/d.    Objective Data:     Objective Information:      T   P  R  BP   MAP  SpO2   Value  36.3  92  16  139/68      94%  Date/Time 3/13 7:55 3/13 7:55 3/13 7:55 3/13 7:55    3/13 7:55  Range  (36.3C - 36.9C )  (84 - 98 )  (16 - 18 )  (126 - 154 )/ (68 - 80 )    (93% - 96% )  Highest temp of 36.9 C was recorded at 3/12 19:29      Pain reported at 3/13 7:55: patient comfortbale at this time    ---- Intake and Output  -----  Mn/Dy/Year Time  Intake   Output  Net  Mar 13, 2023 6:00 am  240   450  -210  Mar 12, 2023 10:00 pm  360   1100  -740  Mar 12, 2023 2:00 pm  80   380  -300    The Intake and Output Totals for the last 24 hours are:      Intake   Output  Net      680   1930  -1250    Physical Exam by System:    Constitutional: NAD.   Eyes: EOMI, clear sclera.   ENMT: MMM.   Head/Neck: C-collar in place. Wound vac intact to  cervical spine intact without alarming for leak or malfunction. SS output to vac canister.   Respiratory/Thorax: Unlabored respirations on RA.   Musculoskeletal: LUE: C5 palsy. 5/5  strength.  Motor unable to flex, extend, abduct, adduct.   Neurological: Alert and oriented x3.   Psychological: Appropriate mood and behavior.   Skin: Warm and dry, no lesions, no rashes.     Medication:    Medications:          Continuous Medications       --------------------------------    1. Lactated Ringers Infusion:  1000  mL  IntraVenous  <Continuous>         Scheduled Medications       --------------------------------    1. amLODIPine (NORVASC):  5  mg  Oral  Daily    2. Atorvastatin:  20   mg  Oral  Every Night    3. Cefepime IV Piggy Back:  2  gram(s)  IntraVenous Piggyback  Every 12 Hours    4. Cholecalciferol (Vitamin D3):  1000  International Unit(s)  Oral  Daily    5. Cyanocobalamin:  1000  microgram(s)  Oral  Daily    6. Docusate:  100  mg  Oral  2 Times a Day    7. Famotidine:  20  mg  Oral  2 Times a Day    8. Folic Acid:  1  mg  Oral  Daily    9. Gabapentin:  300  mg  Oral  3 Times a Day    10. Heparin SubCutaneous:  5000  unit(s)  SubCutaneous  Every 8 Hours    11. Sennosides:  2  tablet(s)  Oral  Daily    12. Thiamine:  100  mg  Oral  Daily    13. Vancomycin - RPh to Dose - IV Piggy Back:  1  each  As Specified  Variable    14. Vancomycin IV Piggy Back:  1250  mg  IntraVenous Piggyback  Every 12 Hours         PRN Medications       --------------------------------    1. Bisacodyl Rectal:  10  mg  Rectal  Daily    2. Cyclobenzaprine:  10  mg  Oral  3 Times a Day    3. Fleet Adult (Sodium Phosphate) Rectal:  1  enema  Rectal  Daily    4. oxyCODONE Immediate Release:  5  mg  Oral  Every 4 Hours    5. Polyethylene Glycol:  17  gram(s)  Oral  Daily        Radiology Results:    Results:    Impression:    1. Soft tissue dehiscence of the posterior neck from C5-T1 with  multiple scattered areas of hypoattenuation areas in the posterior  neck as well as multiple foci of air without definite evidence of a  fluid collection, although evaluation is limited secondary to lack of  IV contrast.  2. Status post laminectomies from C3-C6 levels.      CT C Spine without Contrast [Mar 12 2023  7:46AM]    Assessment and Plan:   Code Status:  ·  Code Status Full Code     Assessment:    DESTINY INFANTE is a 67 year old Male with PMHx significant for HTN, EtOH abuse, and cervical myelopathy s/p fall in Dec 2022. On 12/29/22, patient underwent C3-T1  PCDF with Dr. Bryant and unfortunately suffered from C5 palsy post-operatively and has had LUE motor weakness every since. On 3/6, patient had hardware removal and  revision C3 laminectomy with Dr. Ellis and now presents with wound dehiscence. Plastic  surgery was consulted for assistance with wound closure.     Plan/Recommendations:  - Plan for return to OR tomorrow, 3/14, for repeat cervical washout and potential closure, add on case submitted       · NPO at midnight        · COVID pending collection        · T&S to be updated AM 3/14  - Maintain interim veraflo wound vac with NS and smart instill settings       · Nursing to monitor/record vac output at least every 8 hours        · Notify plastic surgery immediately with concerns of vac leak or malfunction   - F/u cultures from OR 3/12        · Subfascial: 2+ granulocytes, no growth, culture in progress        · Suprafascial: no granulocytes, no growth, culture in progress   - Continue IV ABX per primary (IV cefepime, vanco)   - Remainder of care per primary service   - Plastic surgery will continue to follow     Patient and plan discussed with Dr. Darrius PA-C   Plastic and Reconstructive Surgery   Available via [facility], pager: [phone] or Team phones: [phone]     Time spent on the assessment of patient, gathering and interpreting data, review of medical record/patient history, personally reviewing radiographic imaging and formulation of this note 30 minutes. With greater than 50% spent in personal discussion with  patient.       Electronic Signatures:  Doris Gauthier (PAC)  (Signed 13-Mar-2023 10:17)   Authored: Service, Subjective Data, Objective Data, Assessment  and Plan, Note Completion      Last Updated: 13-Mar-2023 10:17 by Doris Gauthier (PAC)

## 2023-09-14 NOTE — PROGRESS NOTES
Service: Neurosurgery     Subjective Data:   DESTINY INFANTE is a 67 year old Male who is Hospital Day # 4 and POD #1 for 1. Wound Washout and Vac Placement ONLY;    Objective Data:     Objective Information:      T   P  R  BP   MAP  SpO2   Value  36.8  84  20  127/73      95%  Date/Time 3/14 3:29 3/14 3:29 3/14 3:29 3/14 3:29    3/14 3:29  Range  (36.2C - 36.8C )  (84 - 98 )  (16 - 20 )  (126 - 155 )/ (68 - 86 )    (94% - 98% )      Pain reported at 3/13 7:55: patient comfortbale at this time    ---- Intake and Output  -----  Mn/Dy/Year Time  Intake   Output  Net  Mar 14, 2023 6:00 am  900   800  100  Mar 13, 2023 10:00 pm  865   0  865  Mar 13, 2023 2:00 pm  240   200  40    The Intake and Output Totals for the last 24 hours are:      Intake   Output  Net      2005   1000  1005    Physical Exam by System:    Neurological: AOx3  L D/B/T 0 HG IO 5  RUE/BLE 5     Recent Lab Results:    Results:    CBC: 3/13/2023 15:14              \     Hgb     /                              \     11.0 L    /  WBC  ----------------  Plt               9.2       ----------------    400              /     Hct     \                              /     33.8 L    \            RBC: 3.84 L    MCV: 88           RFP: 3/13/2023 15:14  NA+        Cl-     BUN  /                         137    99    21  /  --------------------------------  Glucose                ---------------------------  101 H    K+     HCO3-   Creat \                         4.2    26    1.08  \  Calcium : 10.1Anion Gap : 16          Albumin : 4.1     Phos : 4.2      Assessment and Plan:   Code Status:  ·  Code Status Full Code     Assessment:    67 M h/o HTN, EtOH abuse, cervical myelopathy 12/29 s/p C3-T1 PCDF (DD) c/b C5 palsy c/b hardware pullout 3/6 s/p hardware removal, revision C3 lami (RENAE), p/w wound  breakdown, CXR L hemidiaphragm elevation, CT CS neg for fluid collection  3/12 s/p wound washout, wound vac placement by plastics    Plan:  floor  OR for  removal of spinous process and closure by plastics today  plastics recs  collar   BCX, OR Cx  appreciate ID recs  SCDs      Attestation:   Note Completion:  I am a:  Resident/Fellow   Attending Attestation I saw and evaluated the patient.  I personally obtained the key and critical portions of the history and physical exam or was physically present for key and  critical portions performed by the resident/fellow. I reviewed the resident/fellow?s documentation and discussed the patient with the resident/fellow.  I agree with the resident/fellow?s medical decision making as documented in the note.     I personally evaluated the patient on 14-Mar-2023         Electronic Signatures:  Wilberto Hunt (Resident))  (Signed 14-Mar-2023 08:14)   Authored: Service, Subjective Data, Objective Data, Assessment  and Plan, Note Completion  Ulysses Mesa)  (Signed 15-Mar-2023 12:44)   Authored: Note Completion   Co-Signer: Service, Subjective Data, Objective Data, Assessment and Plan, Note Completion      Last Updated: 15-Mar-2023 12:44 by Ulysses Mesa)

## 2023-09-14 NOTE — PROGRESS NOTES
Service: Plastic Surgery     Subjective Data:   DESTINY INFANTE is a 67 year old Male who is Hospital Day # 2 and POD #0 for 1. Wound Washout and Vac Placement ONLY;     Patient assessed intra-operatively with application of veraflo wound vac and post-operatively. Vac intact. Notes tenderness around wound. Denies fever, chest pain, SOB, abd pain, n/v/d.    Objective Data:     Objective Information:      T   P  R  BP   MAP  SpO2   Value  36.6  87  18  152/79      93%  Date/Time 3/12 7:22 3/12 7:22 3/12 7:22 3/12 7:22    3/12 7:22  Range  (36.6C - 36.9C )  (82 - 100 )  (16 - 18 )  (137 - 163 )/ (65 - 90 )    (93% - 95% )  Highest temp of 36.9 C was recorded at 3/11 11:30      Pain reported at 3/12 13:00: 2 = Mild    ---- Intake and Output  -----  Mn/Dy/Year Time  Intake   Output  Net  Mar 12, 2023 6:00 am  240   250  -10  Mar 11, 2023 10:00 pm  360   200  160  Mar 11, 2023 2:00 pm  0   0  0    The Intake and Output Totals for the last 24 hours are:      Intake   Output  Net      600   450  150    Physical Exam by System:    Constitutional: NAD   Eyes: EOMI, clear sclera   ENMT: MMM   Head/Neck: c-collar in place. Wound vac intact to  cervical spine   Respiratory/Thorax: Unlabored respirations on RA   Musculoskeletal: LUE: C5 palsy. 5/5  strength.  Motor unable to flex, extend, abduct, adduct.   Neurological: alert and oriented x3   Psychological: Appropriate mood and behavior   Skin: Warm and dry, no lesions, no rashes     Assessment and Plan:   Code Status:  ·  Code Status Full Code     Assessment:    DESTINY INFANTE is a 67 year old Male with PMHx significant for HTN, etoh abuse, and cervical myelopathy s/p fall in Dec 2022. On 12/29/22, patient underwent C3-T1  PCDF with Dr. Bryant and unfortunately suffered from C5 palsy post-operatively and has had LUE motor weakness every since. On 3/6, patient had hardware removal and revision C3 laminectomy with Dr. Ellis and now presents with wound dehiscence.  Plastic  surgery was consulted for assistance with wound closure.     Recommendations:  - See NSGY op note for more details  - Maintain veraflo wound vac with NS and smart instill settings (discontinue dakins 2/2 dura exposure)  - Plastics to take patient back to OR on Tues 3/14 for definitive closure (case requested)  - Plastics will follow    Patient and plan discussed with Dr. Reyes, PAIAN  Plastic and Reconstructive Surgery  [facility], Pager [phone], Team phones: [phone]    Time spent on the assessment of patient, gathering and interpreting data, review of medical record/patient history, personally reviewing radiographic imaging and formulation of this note 60 minutes. With greater than 50% spent in personal discussion with  patient.        Electronic Signatures:  Staci Cates (PAC)  (Signed 12-Mar-2023 15:43)   Authored: Service, Subjective Data, Objective Data, Assessment  and Plan, Note Completion      Last Updated: 12-Mar-2023 15:43 by Staci Cates (PAC)

## 2023-09-14 NOTE — H&P
History & Physical Reviewed:   I have reviewed the History and Physical dated:  11-Mar-2023   History and Physical reviewed and relevant findings noted. Patient examined to review pertinent physical  findings.: No significant changes   Home Medications Reviewed: no changes noted   Allergies Reviewed: no changes noted       ERAS (Enhanced Recovery After Surgery):  ·  ERAS Patient: no     Consent:   COVID-19 Consent:  ·  COVID-19 Risk Consent Surgeon has reviewed key risks related to the risk of lisa COVID-19 and if they contract COVID-19 what the risks are.     Attestation:   Note Completion:  I am a:  Resident/Fellow   Attending Attestation I saw and evaluated the patient.  I personally obtained the key and critical portions of the history and physical exam or was physically present for key and  critical portions performed by the resident/fellow. I reviewed the resident/fellow?s documentation and discussed the patient with the resident/fellow.  I agree with the resident/fellow?s medical decision making as documented in the note.     I personally evaluated the patient on 14-Mar-2023         Electronic Signatures:  Lorenzo Mathew (Resident))  (Signed 13-Mar-2023 12:52)   Authored: History & Physical Reviewed, ERAS, Consent,  Note Completion  Tucker Padilla)  (Signed 14-Mar-2023 13:58)   Authored: Note Completion   Co-Signer: History & Physical Reviewed, ERAS, Consent, Note Completion      Last Updated: 14-Mar-2023 13:58 by Tucker Padilla)

## 2023-09-14 NOTE — H&P
History & Physical Reviewed:   I have reviewed the History and Physical dated:  11-Mar-2023   History and Physical reviewed and relevant findings noted. Patient examined to review pertinent physical  findings.: No significant changes   Home Medications Reviewed: no changes noted   Allergies Reviewed: no changes noted       ERAS (Enhanced Recovery After Surgery):  ·  ERAS Patient: no     Consent:   COVID-19 Consent:  ·  COVID-19 Risk Consent Surgeon has reviewed key risks related to the risk of lisa COVID-19 and if they contract COVID-19 what the risks are.     Attestation:   Note Completion:  I am a:  Resident/Fellow   Attending Attestation I saw and evaluated the patient.  I personally obtained the key and critical portions of the history and physical exam or was physically present for key and  critical portions performed by the resident/fellow. I reviewed the resident/fellow?s documentation and discussed the patient with the resident/fellow.  I agree with the resident/fellow?s medical decision making as documented in the note.     I personally evaluated the patient on 12-Mar-2023         Electronic Signatures:  Zaina Oconnor (Resident))  (Signed 11-Mar-2023 16:55)   Authored: History & Physical Reviewed, ERAS, Consent,  Note Completion  Tucker Padilla)  (Signed 14-Mar-2023 13:23)   Authored: Note Completion   Co-Signer: History & Physical Reviewed, ERAS, Consent, Note Completion      Last Updated: 14-Mar-2023 13:23 by Tucker Padilla)

## 2023-09-14 NOTE — PROGRESS NOTES
Service: Plastic Surgery     Subjective Data:   DESTINY INFANTE is a 67 year old Male who is Hospital Day #5 and POD #1 for 1. Excision and debridement down to and including spinous process bone ;2. Paraspinous muscle flap; 3. Trapezius muscle flap;  4. Complex closure; 5. Refer to neurosurgery note.     Having pain postoperatively extending from posterior cervical spine through upper back. Decreased appetite this AM patient states is 2/2 neck and back pain. Utilizing scheduled pain medications. Denies  extremity numbness/tingling. Is ambulatory in cervical collar. Wound vac dressing intact to midline cervical spine without issue. Maintaining continuous suction at 125 mmHg without alarm, leak, obstruction or malfunction. x3 TERRENCE drains with bloody serous  drainage overnight.    Overnight Events: Patient had an uneventful night.     Objective Data:     Objective Information:      T   P  R  BP   MAP  SpO2   Value  37.6  77  18  147/74      96%  Date/Time 3/15 7:43 3/15 7:43 3/15 7:43 3/15 7:43    3/15 7:43  Range  (36.1C - 37.6C )  (77 - 116 )  (17 - 20 )  (127 - 171 )/ (66 - 84 )    (94% - 97% )    Highest temp of 37.6 C was recorded at 3/15 7:43    Pain reported at 3/15 9:48: 9 = Severe    ---- Intake and Output  -----  Mn/Dy/Year Time  Intake   Output  Net  Mar 15, 2023 6:00 am  0   722.5  -723  Mar 14, 2023 10:00 pm  1360   1180  180  Mar 14, 2023 2:00 pm  0   800  -800    The Intake and Output Totals for the last 24 hours are:      Intake   Output  Net      1360   2702  -1343    Physical Exam by System     Constitutional: A&Ox3, NAD, resting upright in bed  with cervical collar in place.   Eyes: EOMI, clear sclera.   ENMT: MMM.   Head/Neck: C-collar in place. Incisional wound vac  dressing intact to midline cervical spine surgical incision/site without issue. Maintaining continuous suction at 125 mmHg without alarm, leak, obstruction or malfunction. To hospital vac system with no output appreciated in  collecting canister. x3 TERRENCE  drains (2 at R cervical paraspinal region and 1 at L cervical paraspinal region), intact and with bloody serosanguinous drainage.   Respiratory/Thorax: Unlabored respirations on RA.   Cardiovascular: Extremities warm, well perfused.   Neurological: Alert and oriented x3.   Psychological: Appropriate mood and behavior.   Skin: Warm and dry, no lesions, no rashes.     Medication     Medications:        Continuous Medications       --------------------------------  No continuous medications are active       Scheduled Medications       --------------------------------  1. amLODIPine (NORVASC):  5  mg  Oral  Daily    2. Cefepime IV Piggy Back:  2  gram(s)  IntraVenous Piggyback  Every 8 Hours    3. Cholecalciferol (Vitamin D3):  1000  International Unit(s)  Oral  Daily    4. Cyanocobalamin:  1000  microgram(s)  Oral  Daily    5. Docusate:  100  mg  Oral  2 Times a Day    6. Famotidine:  20  mg  Oral  2 Times a Day    7. Folic Acid:  1  mg  Oral  Daily    8. Gabapentin:  300  mg  Oral  3 Times a Day    9. Heparin SubCutaneous:  5000  unit(s)  SubCutaneous  Every 8 Hours    10. Multivitamin with Minerals:  1  tablet(s)  Oral  Daily    11. Nicotine 21 mg/ 24 hour TransDermal:  1  patch  TransDermal  Every 24 Hours    12. Sennosides:  2  tablet(s)  Oral  Daily    13. Thiamine:  100  mg  Oral  Daily    14. Vancomycin - RPh to Dose - IV Piggy Back:  1  each  As Specified  Variable    15. Vancomycin - RPh to Dose - IV Piggy Back:  1  each  As Specified  Variable    16. Vancomycin IV Piggy Back:  1250  mg  IntraVenous Piggyback  Every 12 Hours         PRN Medications       --------------------------------  1. Acetaminophen:  650  mg  Oral  Every 4 Hours    2. Bisacodyl Rectal:  10  mg  Rectal  Daily    3. Cyclobenzaprine:  10  mg  Oral  3 Times a Day    4. Fleet Adult (Sodium Phosphate) Rectal:  1  enema  Rectal  Daily    5. HYDROmorphone Injectable:  0.2  mg  IntraVenous Push  Every 4 Hours    6.  Ondansetron Injectable:  4  mg  IntraVenous Push  Every 6 Hours    7. oxyCODONE Immediate Release:  5  mg  Oral  Every 4 Hours    8. oxyCODONE Immediate Release:  10  mg  Oral  Every 4 Hours    9. Polyethylene Glycol:  17  gram(s)  Oral  Daily        Recent Lab Results     Results:    I have reviewed these laboratory results:    Culture, Miscellaneous, includes Gram Stain  Trending View      Result 14-Mar-2023 13:23:00  14-Mar-2023 13:22:00    Gram Stain NO GRANULOCYTES OR ORGANISMS SEEN.   2+ GRANULOCYTES. NO ORGANISMS SEEN.        Coronavirus 2019, Screen Asymptomatic  13-Mar-2023 16:28:00      Result Value    Fluid Source  Nasal, Nasopharyngeal    Coronavirus 2019,PCR  NOT DETECTED  Reference Range: Not Detected .This assay is designed to detect the ORF1ab and/or S genes of SARS-CoV-2 via nucleic acid amplification. A Not  Detected result does not preclude 2019-nCoV infection since the adequacy of sample haile      Complete Blood Count  13-Mar-2023 15:14:00      Result Value    White Blood Cell Count  9.2    Nucleated Erythrocyte Count  0.0    Red Blood Cell Count  3.84   L   HGB  11.0   L   HCT  33.8   L   MCV  88    MCHC  32.5    PLT  400    RDW-CV  12.9      Renal Function Panel  13-Mar-2023 15:14:00      Result Value    Glucose, Serum  101   H   NA  137    K  4.2    CL  99    Bicarbonate, Serum  26    Anion Gap, Serum  16    BUN  21    CREAT  1.08    GFR Male  75    Calcium, Serum  10.1    Phosphorus, Serum  4.2    ALB  4.1        Assessment and Plan:   Code Status   ·  Code Status Full Code     Assessment:    DESTINY INFANTE is a 67 year old Male with PMHx significant for HTN, EtOH abuse, and cervical myelopathy s/p fall in Dec 2022. On 12/29/22, patient underwent C3-T1  PCDF with Dr. Bryant and unfortunately suffered from C5 palsy post-operatively and has had LUE motor weakness every since. On 3/6, patient had hardware removal and revision C3 laminectomy with Dr. Ellis and re-presented 3/11 with wound  dehiscence.  Plastic surgery consulted for assistance with wound closure. Patient now additionally s/p     Plan/Recommendations:  - Continue incisional wound vac therapy to cervical spinal incision/surgical site x10 days postoperatively      ·  Settings: 125 mmHg low continuous suction      ·  Please keep dressing C/D/I, reinforce PRN      ·  Record vac output quality and quantity per nursing q8h      ·  Please alert plastics team with any concerns regarding vac   - OK for discharge with wound vac in place with removal upon outpt f/u with plastics     (will need transitioned to portable Prevena vac prior to DC)   - Continue TERRENCE drain care to x3 drains present at b/l cervical spinal region      ·  Strip drain tubing TID and PRN     ·  Monitor and record output q8h      ·  Keep drain sites C/D/I with daily drain dressing changes      ·  Removal per plastic surgery team when output <30 cc/24 hrs for 2 consecutive days      ·  OK for DC with drains in place with removal on outpt f/u with plastics   - OR cx hx as follows:      ·  3/12 subfascial: No growth aerobically or anaerobically (final 3/14)      ·  3/12 suprafascial: No growth aerobically or anaerobically (final 3/14)      ·  3/14 cervical bone: NGTD (pending finalization)      ·  3/14 cervical tissue: NGTD (pending finalization)   - Continue IV ABX per ID recs/primary (IV cefepime, vanc)   - Appreciate remaining supportive care per primary service   - Plastic surgery will continue to follow     Patient and plan discussed with Dr. Shelly PA-C  Plastic and Reconstructive Surgery   [facility]  Pager #[phone]  Team phones: [phone]     Time spent on the assessment of patient, gathering and interpreting data, review of medical record/patient history, personally reviewing radiographic imaging and formulation of this note 30 minutes. With greater than 50% spent in personal discussion with  patient.     Plan of Care Reviewed With   Plan of  Care Reviewed With: patient     Electronic Signatures for Addendum Section:   Ty Robles) (Signed Addendum 15-Mar-2023 23:02)   I met with Mr. Sharp today, which marks his post operative day 1. Patient is in stable medical condition, and had no serious complaints. VAC was holding suction  when I rounded and his drains had low output. I discussed with him and Malia discharge plan and recommendations. He was in agreement.   I reviewed Malia notes and I approved them.     Electronic Signatures:  Malia Ricks (PAC)  (Signed 15-Mar-2023 12:35)   Authored: Service, Subjective Data, Objective Data, Assessment  and Plan, Note Completion      Last Updated: 15-Mar-2023 23:02 by Ty Robles)

## 2023-09-14 NOTE — PROGRESS NOTES
Service: Neurosurgery     Subjective Data:   DESTINY INFANTE is a 67 year old Male who is Hospital Day # 7 and POD #3 for 1. Excision and debridement down to and including spinous process bone;2. Paraspinous muscle flap;3. Trapezius muscle flap;4.  Complex closure;5. Refer to neurosurgery note.    Objective Data:     Objective Information:      T   P  R  BP   MAP  SpO2   Value  36.4  90  20  155/77      97%  Date/Time 3/17 0:38 3/17 0:38 3/17 0:38 3/17 0:38    3/17 0:38  Range  (36C - 37C )  (73 - 99 )  (15 - 20 )  (124 - 156 )/ (59 - 79 )    (92% - 97% )  Highest temp of 37 C was recorded at 3/16 21:46      Pain reported at 3/16 21:39: 3 = Mild    ---- Intake and Output  -----  Mn/Dy/Year Time  Intake   Output  Net  Mar 15, 2023 10:00 pm  200   510  -310  Mar 15, 2023 2:00 pm  120   135  -15  Mar 15, 2023 6:00 am  0   722.5  -723    The Intake and Output Totals for the last 24 hours are:      Intake   Output  Net      1362   2702  -1343    Physical Exam by System:    Neurological: AOx3  L D/B/T 0 HG IO 5  RUE/BLE 5     Recent Lab Results:    Results:    CBC: 3/16/2023 04:54              \     Hgb     /                              \     8.8 L    /  WBC  ----------------  Plt               9.1       ----------------    286              /     Hct     \                              /     26.3 L    \            RBC: 3.00 L    MCV: 88           RFP: 3/16/2023 04:54  NA+        Cl-     BUN  /                         132 L   98    14  /  --------------------------------  Glucose                ---------------------------  109 H    K+     HCO3-   Creat \                         4.0    23    0.86  \  Calcium : 9.1Anion Gap : 15          Albumin : 3.0 L    Phos : 3.5      Assessment and Plan:   Comorbidities:  ·  Comorbidity anemia   ·  Anemia acute blood loss anemia     Code Status:  ·  Code Status Full Code     Assessment:    67 M h/o HTN, EtOH abuse, cervical myelopathy 12/29 s/p C3-T1 PCDF (DD) c/b C5 palsy  c/b hardware pullout 3/6 s/p hardware removal, revision C3 lami (RENAE), p/w wound  breakdown, CXR L hemidiaphragm elevation, CT CS neg for fluid collection  3/12 s/p wound washout, wound vac placement by plastics  3/14 s/p spinous process removal, closure by plastics    Plan:  floor  drains/prevena per plastics  maintain c collar  appreciate ID recs - augmentin and doxy x2 wks  BCX, OR Cx    plan for discharge home today      Attestation:   Note Completion:  I am a:  Resident/Fellow   Attending Attestation I reviewed the resident/fellow?s documentation and discussed the patient with the resident/fellow.  I agree with the resident/fellow?s medical  decision making as documented in the note.          Electronic Signatures:  Wilberto Hunt (Resident))  (Signed 17-Mar-2023 02:14)   Authored: Service, Subjective Data, Objective Data, Assessment  and Plan, Note Completion  Ulysses Mesa)  (Signed 17-Mar-2023 12:29)   Authored: Note Completion   Co-Signer: Service, Subjective Data, Objective Data, Assessment and Plan, Note Completion      Last Updated: 17-Mar-2023 12:29 by Ulysses Mesa)

## 2023-09-14 NOTE — PROGRESS NOTES
Service: Neurosurgery     Subjective Data:   DESTINY INFANTE is a 67 year old Male who is Hospital Day # 2 and POD #0 for 1. Wound Washout and Vac Placement ONLY;    Objective Data:     Objective Information:      T   P  R  BP   MAP  SpO2   Value  36.9  88  18  128/68      96%  Date/Time 3/12 19:29 3/12 19:29 3/12 19:29 3/12 19:29    3/12 19:29  Range  (36.6C - 36.9C )  (82 - 100 )  (16 - 18 )  (128 - 163 )/ (65 - 90 )    (93% - 96% )  Highest temp of 36.9 C was recorded at 3/11 11:30      Pain reported at 3/12 13:00: 2 = Mild    ---- Intake and Output  -----  Mn/Dy/Year Time  Intake   Output  Net  Mar 12, 2023 2:00 pm  80   380  -300  Mar 12, 2023 6:00 am  240   250  -10  Mar 11, 2023 10:00 pm  360   200  160    The Intake and Output Totals for the last 24 hours are:      Intake   Output  Net      600   450  150    Physical Exam by System:    Neurological: AOx3  L D/B/T 0 HG IO 5  RUE/BLE 5     Recent Lab Results:    Results:    CBC: 3/11/2023 11:52              \     Hgb     /                              \     10.2 L    /  WBC  ----------------  Plt               6.8       ----------------    296              /     Hct     \                              /     31.4 L    \            RBC: 3.56 L    MCV: 88           RFP: 3/11/2023 11:52  NA+        Cl-     BUN  /                         134 L   100    12  /  --------------------------------  Glucose                ---------------------------  113 H    K+     HCO3-   Creat \                         4.2    25    0.83  \  Calcium : 10.3Anion Gap : 13          Albumin : 3.9     Phos : 3.6      Coagulation: 3/11/2023 11:52  PT  /                    10.9  /  -------<    INR          ----------<      0.9  PTT\                    30  \                       Assessment and Plan:   Code Status:  ·  Code Status Full Code     Assessment:    67 M h/o HTN, EtOH abuse, cervical myelopathy 12/29 s/p C3-T1 PCDF (DD) c/b C5 palsy c/b hardware pullout 3/6 s/p hardware  removal, revision C3 lami (JA), p/w wound  breakdown, CXR L hemidiaphragm elevation, CT CS neg for fluid collection  3/12 s/p wound washout, wound vac placement by plastics    Plan:  floor  OR for removal of spinous process and closure by plastics Tues 3/13  plastics recs  collar   SNIFF test  BCX, OR Cx  Vanc, Cefepime - ID consult in am  SCDs      Attestation:   Note Completion:  I am a:  Resident/Fellow   Attending Attestation I saw and evaluated the patient.  I personally obtained the key and critical portions of the history and physical exam or was physically present for key and  critical portions performed by the resident/fellow. I reviewed the resident/fellow?s documentation and discussed the patient with the resident/fellow.  I agree with the resident/fellow?s medical decision making as documented in the note.     I personally evaluated the patient on 13-Mar-2023         Electronic Signatures:  Brooklynn Cloud (Resident))  (Signed 12-Mar-2023 21:36)   Authored: Service, Subjective Data, Objective Data, Assessment  and Plan, Note Completion  Ulysses Mesa)  (Signed 13-Mar-2023 09:13)   Authored: Note Completion      Last Updated: 13-Mar-2023 09:13 by Ulysses Mesa)

## 2023-09-14 NOTE — PROGRESS NOTES
Service: Neurosurgery     Subjective Data:   DESTINY INFANTE is a 67 year old Male who is Hospital Day # 5 and POD #1 for 1. Excision and debridement down to and including spinous process bone;2. Paraspinous muscle flap;3. Trapezius muscle flap;4.  Complex closure;5. Refer to neurosurgery note.    Objective Data:     Objective Information:      T   P  R  BP   MAP  SpO2   Value  37.6  77  18  147/74      96%  Date/Time 3/15 7:43 3/15 7:43 3/15 7:43 3/15 7:43    3/15 7:43  Range  (36.1C - 37.6C )  (77 - 116 )  (17 - 20 )  (127 - 171 )/ (66 - 84 )    (94% - 97% )  Highest temp of 37.6 C was recorded at 3/15 7:43      Pain reported at 3/15 6:21: 8 = Severe    ---- Intake and Output  -----  Mn/Dy/Year Time  Intake   Output  Net  Mar 15, 2023 6:00 am  0   722.5  -723  Mar 14, 2023 10:00 pm  1360   1180  180  Mar 14, 2023 2:00 pm  0   800  -800    The Intake and Output Totals for the last 24 hours are:      Intake   Output  Net      1360   2702  -1343    Physical Exam by System:    Neurological: AOx3  L D/B/T 0 HG IO 5  RUE/BLE 5     Recent Lab Results:    Results:    CBC: 3/13/2023 15:14              \     Hgb     /                              \     11.0 L    /  WBC  ----------------  Plt               9.2       ----------------    400              /     Hct     \                              /     33.8 L    \            RBC: 3.84 L    MCV: 88           RFP: 3/13/2023 15:14  NA+        Cl-     BUN  /                         137    99    21  /  --------------------------------  Glucose                ---------------------------  101 H    K+     HCO3-   Creat \                         4.2    26    1.08  \  Calcium : 10.1Anion Gap : 16          Albumin : 4.1     Phos : 4.2      Assessment and Plan:   Code Status:  ·  Code Status Full Code     Assessment:    67 M h/o HTN, EtOH abuse, cervical myelopathy 12/29 s/p C3-T1 PCDF (DD) c/b C5 palsy c/b hardware pullout 3/6 s/p hardware removal, revision C3 lami (JA),  p/w wound  breakdown, CXR L hemidiaphragm elevation, CT CS neg for fluid collection  3/12 s/p wound washout, wound vac placement by plastics  3/14 s/p spinous process removal, closure by plastics    Plan:  floor  drains/prevena per plastics  appreciate ID recs  BCX, OR Cx      Attestation:   Note Completion:  I am a:  Resident/Fellow   Attending Attestation I saw and evaluated the patient.  I personally obtained the key and critical portions of the history and physical exam or was physically present for key and  critical portions performed by the resident/fellow. I reviewed the resident/fellow?s documentation and discussed the patient with the resident/fellow.  I agree with the resident/fellow?s medical decision making as documented in the note.     I personally evaluated the patient on 15-Mar-2023         Electronic Signatures:  Wilberto Hunt (Resident))  (Signed 15-Mar-2023 09:11)   Authored: Service, Subjective Data, Objective Data, Assessment  and Plan, Note Completion  Ulysses Mesa)  (Signed 15-Mar-2023 12:47)   Authored: Note Completion   Co-Signer: Service, Subjective Data, Objective Data, Assessment and Plan, Note Completion      Last Updated: 15-Mar-2023 12:47 by Ulysses Mesa)

## 2023-09-14 NOTE — PROGRESS NOTES
Service: Plastic Surgery     Subjective Data:   DESTINY INFANTE is a 67 year old Male who is Hospital Day # 7 and POD #3 for 1. Excision and debridement down to and including spinous process bone;2. Paraspinous muscle flap;3. Trapezius muscle flap;4.  Complex closure;5. Refer to neurosurgery note.     Resting upright in bed on assessment, no acute concerns per patient. Issues with wound vac overnight alarming for leak, attempted to be repaired per nursing with tegaderm, alarming present upon assessment.  Continues to experience postsurgical pain to his cervical spinal region and upper back, improving day by day. x3 TERRENCE drains intact to b/l cervical paraspinal region w/o issue overnight. He is ambulating and voiding independently. Denies headache, dizziness,  fever, chills, chest pain, dyspnea, abdominal pain, nausea, vomiting or extremity numbness/tingling.    Overnight Events: Patient had an uneventful night.     Objective Data:     Objective Information:      T   P  R  BP   MAP  SpO2   Value  35.9  90  18  152/71      93%  Date/Time 3/17 9:41 3/17 9:41 3/17 9:41 3/17 9:41    3/17 9:41  Range  (35.9C - 37C )  (73 - 99 )  (15 - 20 )  (124 - 156 )/ (59 - 79 )    (92% - 97% )  Highest temp of 37 C was recorded at 3/16 21:46      Pain reported at 3/16 21:39: 3 = Mild    ---- Intake and Output  -----  Mn/Dy/Year Time  Intake   Output  Net  Mar 17, 2023 6:00 am  0   920  -920  Mar 16, 2023 10:00 pm  300   160  140  Mar 16, 2023 2:00 pm  0   530  -530    The Intake and Output Totals for the last 24 hours are:      Intake   Output  Net      300   1610  -1310    Physical Exam by System:    Constitutional: A&Ox3, NAD, resting upright in bed  with cervical collar in place.   Eyes: EOMI, clear sclera.   ENMT: MMM.   Head/Neck: C-collar in place. Incisional wound vac  dressing intact to midline cervical spine surgical incision/site with initial alarming for leak. Attempted to reinforce with no resolution of vac, entire  dressing replaced with vac dressing maintaining appropriate continuous suction. x3 TERRENCE drains (2 at  R cervical paraspinal region and 1 at L cervical paraspinal region), intact and with serosanguinous drainage.   Respiratory/Thorax: Unlabored respirations on RA.   Cardiovascular: Extremities warm, well perfused.   Gastrointestinal: Soft, ND/NT.   Genitourinary: Voiding independently.   Extremities: No peripheral edema.   Neurological: Alert and oriented x3.   Psychological: Appropriate mood and behavior.   Skin: Warm and dry, no lesions, no rashes.     Medication:    Medications:          Continuous Medications       --------------------------------  No continuous medications are active       Scheduled Medications       --------------------------------    1. amLODIPine (NORVASC):  5  mg  Oral  Daily    2. Amoxicillin 875 mg - Clavulanate 125 m  tablet(s)  Oral  Every 12 Hours    3. Cholecalciferol (Vitamin D3):  1000  International Unit(s)  Oral  Daily    4. Cyanocobalamin:  1000  microgram(s)  Oral  Daily    5. Docusate:  100  mg  Oral  2 Times a Day    6. Doxycycline:  100  mg  Oral  Every 12 Hours    7. Famotidine:  20  mg  Oral  2 Times a Day    8. Folic Acid:  1  mg  Oral  Daily    9. Gabapentin:  300  mg  Oral  3 Times a Day    10. Heparin SubCutaneous:  5000  unit(s)  SubCutaneous  Every 8 Hours    11. Multivitamin with Minerals:  1  tablet(s)  Oral  Daily    12. Nicotine 21 mg/ 24 hour TransDermal:  1  patch  TransDermal  Every 24 Hours    13. Sennosides:  2  tablet(s)  Oral  Daily    14. Thiamine:  100  mg  Oral  Daily         PRN Medications       --------------------------------    1. Acetaminophen:  650  mg  Oral  Every 4 Hours    2. Bisacodyl Rectal:  10  mg  Rectal  Daily    3. Cyclobenzaprine:  10  mg  Oral  3 Times a Day    4. Fleet Adult (Sodium Phosphate) Rectal:  1  enema  Rectal  Daily    5. HYDROmorphone Injectable:  0.2  mg  IntraVenous Push  Every 4 Hours    6. Ondansetron Injectable:  4   mg  IntraVenous Push  Every 6 Hours    7. oxyCODONE Immediate Release:  5  mg  Oral  Every 4 Hours    8. oxyCODONE Immediate Release:  10  mg  Oral  Every 4 Hours    9. Polyethylene Glycol:  17  gram(s)  Oral  Daily        Recent Lab Results:    Results:    CBC: 3/17/2023 08:23              \     Hgb     /                              \     8.7 L    /  WBC  ----------------  Plt               6.9       ----------------    284              /     Hct     \                              /     25.8 L    \            RBC: 2.97 L    MCV: 87           RFP: 3/17/2023 08:23  NA+        Cl-     BUN  /                         134 L   100    13  /  --------------------------------  Glucose                ---------------------------  104 H    K+     HCO3-   Creat \                         4.2    27    0.88  \  Calcium : 9.6Anion Gap : 11          Albumin : 3.3 L    Phos : 3.4      Assessment and Plan:   Code Status:  ·  Code Status Full Code     Assessment:    DESTINY INFANTE is a 67 year old Male with PMHx significant for HTN, EtOH abuse, and cervical myelopathy s/p fall in Dec 2022. On 12/29/22, patient underwent C3-T1  PCDF with Dr. Brynat and unfortunately suffered from C5 palsy post-operatively and has had LUE motor weakness every since. On 3/6, patient had hardware removal and revision C3 laminectomy with Dr. Ellis and re-presented 3/11 with wound dehiscence.  Plastic surgery consulted for assistance with wound closure. Patient now s/p cervical spinal wound I&D with placement of wound vac on 3/12 (per NSGY) plus removal and debridement of spinous process (per NSGY), cervical wound I&D, paraspinous + trapezius  b/l muscle flaps, complex closure and application of incisional wound vac (per plastics) on 3/14.     OR course (this admission):   3/12/23 - Cervical spinal wound I&D with placement of wound vac (per NSGY)   3/14/23 - Removal and debridement of spinous process (per NSGY), cervical wound I&D, paraspinous +  trapezius b/l muscle flaps, complex closure and application of incisional wound vac (per plastics)     Plan/Recommendations:  - Continue incisional wound vac therapy to cervical spinal incision/surgical site x10 days postoperatively      ·  Settings: 125 mmHg low continuous suction      ·  Please keep dressing C/D/I, reinforce PRN      ·  Record vac output quality and quantity per nursing q8h      ·  Please alert plastics team with any concerns regarding vac   - OK for DC with wound vac in place with removal upon outpt f/u with plastics 1 week post DC     ·  Vac replaced at bedside 3/17 due to persistent air leak with no further concerns   - Continue TERRENCE drain care to x3 drains present at b/l cervical spinal region      ·  Strip drain tubing TID and PRN     ·  Monitor and record output q8h      ·  Keep drain sites C/D/I with daily drain dressing changes      ·  OK for DC with drains in place with removal upon outpt f/u with plastics   - OR cx hx as follows:      ·  3/12 subfascial: No growth aerobically or anaerobically (final 3/14)      ·  3/12 suprafascial: No growth aerobically or anaerobically (final 3/14)      ·  3/14 cervical bone: NGTD (pending finalization)      ·  3/14 cervical tissue: NGTD (pending finalization)   - Continue IV ABX per ID recs/primary (currently PO Augmentin/doxycycline)   - OK for DC from plastic surgery standpoint, final disposition per primary when medically clear   - Outpt f/u with plastics requested for 1 week post DC (appt info and vac/drain care info added to DP2)   - Appreciate remaining supportive care per primary service   - Plastic surgery will continue to follow postoperatively while patient in house     Patient and plan discussed with Dr. Sanchez.     Doris Gauthier PA-C   Plastic and Reconstructive Surgery   Available via [employer], pager: [phone] or Team phones: [phone]     Time spent on the assessment of patient, gathering and interpreting data, review of medical  record/patient history, personally reviewing radiographic imaging and formulation of this note 60 minutes. With greater than 50% spent in personal discussion with  patient.     Plan of Care Reviewed With:  Plan of Care Reviewed With: patient       Electronic Signatures:  Doris Gauthier (PAC)  (Signed 17-Mar-2023 10:48)   Authored: Service, Subjective Data, Objective Data, Assessment  and Plan, Note Completion      Last Updated: 17-Mar-2023 10:48 by oDris Gauthier (PAC)

## 2023-09-14 NOTE — DISCHARGE SUMMARY
Send Summary:   Discharge Summary Providers:  Provider Role Provider Name   · Attending Ulysses Mesa   · Referring Ulysses Mesa   · Primary Marcio Arias       Note Recipients: Marcio Arias MD       Discharge:    Summary:   Admission Date: .11-Mar-2023 06:24:00   Discharge Date: 17-Mar-2023   Attending Physician at Discharge: Ulysses Mesa   Admission Reason: Cervical wound breakdown (1)   Final Discharge Diagnoses: Wound dehiscence, surgical   Procedures: Date: 12-Mar-2023 12:00:00  Procedure Name: 1. Wound Washout and Vac Placement ONLY      Date: 14-Mar-2023 12:08:00  Procedure Name: removal and debridement of spinous process  complex wound closure    Date: 14-Mar-2023 15:26:00  Procedure Name: 1. Excision and debridement down to and including paraspinal and trapezius muscles   2. Paraspinous muscle flap bilaterally  3. Trapezius muscle flap bilaterally  4. Complex closure  5. Incisional wound VAC  6. Refer to neurosurgery note   Condition at Discharge: Satisfactory   Disposition at Discharge: Home Health Care - Resumed   Vital Signs:        T   P  R  BP   MAP  SpO2   Value  35.9  90  18  152/71      93%  Date/Time 3/17 9:41 3/17 9:41 3/17 9:41 3/17 9:41    3/17 9:41  Range  (35.9C - 37C )  (73 - 99 )  (15 - 20 )  (124 - 156 )/ (59 - 79 )    (92% - 97% )  Highest temp of 37 C was recorded at 3/16 21:46    Date:            Weight/Scale Type:  Height:   13-Mar-2023 15:48  81.8  kg         180.3  cm  Physical Exam:    General: in bed, awake, no distress  HEENT: normocephalic, hard cervical collar on; JAYY; tongue midline  Cardiac: S1 & S2 regular  Resp: equal chest expansion, lungs clear bilaterally  Abd: BS +x4, soft, non-tender  Extr: no edema; pulses palpable x4  Neuro: A&Ox3, follows commands, PETTIT's, LUE 4/5, RUE/BLE's 5/5  Skin: posterior neck incision with drains x3 covered with dressing/pravena  Psyche: calm, cooperative  Hospital Course:    68yo male with Hx of HTN, HLD, cervical myelopathy  12/29 s/p C3-T1 PCDF (DD) c/b C5 palsy, c/b hardware pullout with hardware removal 3/6, revision C3 lami (JA),  who presented with wound breakdown, dehiscence; CT CS neg for fluid collection.    Hospital Course:  3/12 s/p wound washout, wound vac placement by plastics; IV vanco and cefepime started  3/13 ID consulted; cultures negative to date  3/14 s/p spinous process removal, complex closure by plastics with placement of wound vac and TERRENCE drains x3  3/16 wound vac changed to pravena drainage for discharge to home. Cultures negative; ID--> discharge on 2 weeks of oral Augmentin and Doxycycline.      PT/OT eval recommend Home Care.    The patient recovered well after surgery; discharged to home with Home Care and scheduled follow-up with Neurosurgery and Plastics.     Immunizations:    Immunizations:  29-Jun-2022   Tdap: Immunizations, 29-Jun-2022      Discharge Information:    and Continuing Care:   Lab Results - Pending:    Culture, Miscellaneous, includes Gram Stain Drawn at 14-Mar-2023 13:23:00  Culture, Fungus + Fungus Stain Drawn at 12-Mar-2023 12:21:00  Culture, Fungus + Fungus Stain Drawn at 12-Mar-2023 12:21:00  Radiology Results - Pending: None   Discharge Instructions:    Activity:           activity as tolerated.          May shower..  Do not wet wound vac dressing or device          May not return to school/work until follow-up visit with.  Plastics and Neurosurgery            May not drive until follow-up visit.            No pushing, pulling, or lifting objects greater than 10 pounds until follow-up visit.            Weight-bearing Instructions: weight-bearing as tolerated.            When showering, do not allow wound vac dressing or device to become wet.          Increase your activity slowly. Walking is good exercise activity after surgery. Gradually increase the time and distance you walk each day. You may walk as much as is comfortable for you.          Pain or discomfort is a guide to cut back on  your activity          You may ride in a car with your seat belt on    Nutrition/Diet:           resume normal diet    Wound Care:           Vac Site:   Cervical (neck) surgical incisional vac          Change Dressing:   No dressing changes required          Dressing Type:   Prevena incisional          Target Pressure:   125          VAC Cycle:   continuous          Other Instructions:   Please allow Prevena incisional wound vac dressing to remain in place until output follow-up with plastic surgery. When showering, do not allow the wound vac dressing or device to become wet. When resting, please make sure  to plug in the device with the supplied power cord to maintain the battery. The device has a power button at the center which is encircled by green lights. There will be one less light illuminated each day (every 24 hrs) which is to be expected, and signifies  the count down of the duration of the vac device. If you experience any issues with your wound vac including alarms, malfunction or dressing issues please refer to the provided instruction manual or contact the plastic surgery office at [phone].          DO NOT soak in a tub or swim until incision is completely healed.  This may take approximately 4 weeks          DO NOT scrub or rub the incision.  You may gently pat the incision          DO NOT pick off scabs, or old blood from the incision site.  The incision should heal naturally on its own    Drain/Tube Care:           Type:   TERRENCE (Clint Gallagher)          Site:   Total of 3 drains (x1 at left upper back, x2 at right upper back)          Suction:   self          Cleanse With:   soap and water          Dress With:   open to air          Other Instructions:   You will be discharged home with a surgical drain which was placed intraoperatively by plastic surgery. Your drain should remain in place until your outpatient follow up with plastic surgery where it will eventually be removed.  Strip drain tubing  when emptying (or at least twice per day) to ensure there are no clots impeding drainage. To empty the drain, open the cap, tip into cup and squeeze to empty. Squeeze drain flat then replace the cap. Please empty the drain and record  its output 3 times a day (or as needed if filled) and bring these numbers to your follow up appointment. The drain output should decrease and the color of the drainage should become lighter (red to pink to yellow). This drain is sutured (stitched) into  place. Keep the area around the drain clean and dry. You may use mild soap and water to cleanse around the drain. If you have been cleared to shower, avoid soaking/submerging the drains or drain site(s), and do not soak in a tub. No dressings are required  but you may apply dry drain guaze as desired which should be changed intermittingly and if soiled. Call the plastic surgery office with any concerns regarding your drain or if drain stops functioning, is inadvertently removed or if you notice drastic  changes in drain output, bloody drain output, or redness/drainage around the insertion site.    Additional Orders:           Special Equipment:   cervical collar          Cervical Collar Instructions:   -Wear hard collar to promote healing.  Keep your collar on and properly tightened at all times.  Remove it to wash your face and neck and to change soiled pads.  Remove your collar only with the help of another  person.  - Once daily remove collar to wash face, neck and change any soiled pads.  - Keep head and neck straight and still while collar is removed.    Home Care Certification:           Home Care Agency:   Other (with phone number)   [facility]          Skilled Disciplines Ordered:   RN/LPN,  PT,  OT    Home Care Services:           Home Care Skilled Service:   assessment,  drain care,  Rehab (PT/OT/SP eval and treat),  wound care    Follow Up Appointments:    Follow-Up Appointment 01:           Physician/Dept/Service:    Plastic & Reconstructive Surgery          Reason for Referral:   Cervical (neck) spinal wound closure postoperative follow-up, wound vac and drain removal          Call to Schedule in:   1 week          Scheduled Date/Time:   24-Mar-2023 09:00          Location:   [facility] - Department of Surgery, [address], [address]   [place] [address]          Phone Number:   [phone]    Follow-Up:           Physician/Dept/Service:   Toshia Silva CNP          Scheduled Date/Time:   30-Mar-2023 15:30          Location:   [address] [place] [address] C, [id number] [id number]          Phone Number:   [phone]    Discharge Medications: Home Medication   amLODIPine 5 mg oral tablet - 1 tab(s) orally once a day (in the morning)  cholecalciferol 25 mcg (1000 intl units) oral capsule - 1 cap(s) orally once a day (in the morning)  folic acid 1 mg oral tablet - 1 tab(s) orally once a day  thiamine 100 mg oral tablet - 1 tab(s) orally once a day  gabapentin 100 mg oral capsule - 3 cap(s) orally 3 times a day  losartan 100 mg oral tablet - 1 tab(s) orally once a day  cloNIDine 0.1 mg oral tablet - 1 tab(s) orally 2 times a day  Multiple Vitamins with Minerals oral tablet - 1 tab(s) orally once a day  amoxicillin-clavulanate 875 mg-125 mg oral tablet - 1 tab(s) orally every 12 hours  doxycycline hyclate 100 mg oral tablet - 1 tab(s) orally every 12 hours  sennosides-docusate 8.6 mg-50 mg oral tablet - 2 tab(s) orally once a day (at bedtime) while taking pain medication oxycodone; to prevent constipation  atorvastatin 20 mg oral tablet - 1 tab(s) orally once a day     PRN Medication   cyclobenzaprine 10 mg oral tablet - 1 tab(s) orally 3 times a day, As needed, muscle spasms  oxyCODONE 5 mg oral tablet - 1 tab(s) orally every 6 hours, As Needed for moderate to severe postop [pain  ICD 10: G89.18  acetaminophen 325 mg oral tablet - 2 tab(s) orally every 4 hours, As  "needed, Pain - Mild (1-3)     DNR Status:   ·  Code Status Code Status order at time of discharge: Full Code     Attestation:   Note Completion:  Provider/Team Pager # [phone]  Total time spent today was 45 minutes, all of which was spent coordinating patient's discharge         Electronic Signatures:  Patience Santiago (APRN-CNP)  (Signed 17-Mar-2023 10:25)   Authored: Send Summary, Summary Content, Immunizations,  Ongoing Care, DNR Status, Note Completion      Last Updated: 17-Mar-2023 10:25 by Patience Santiago (APRN-CNP)    References:  1.  Data Referenced From \"History and Physical\" 11-Mar-2023 10:27   "

## 2023-09-14 NOTE — PROGRESS NOTES
Service: Infectious Disease     Subjective Data:   DESTINY INFANTE is a 67 year old Male who is Hospital Day # 5 and POD #1 for 1. Excision and debridement down to and including spinous process bone;2. Paraspinous muscle flap;3. Trapezius muscle flap;4.  Complex closure;5. Refer to neurosurgery note.    Additional Information:    Patient tolerating pain with PRN medications given. Decreased appetite that he attributes to pain    Follow-up rounds in mid afternoon, patient's wife was present  Reviewed history  Wife did describe large lemon size wound dehiscence  Wife also had noted clear serous drainage but no purulence prior to complete dehiscence    On 3/8/2023 had been discharged home after admission 3/6/2023.  Reported the drain fell out prior to discharge.      Objective Data:     Objective Information:      T   P  R  BP   MAP  SpO2   Value  37.3  104  18  150/73      94%  Date/Time 3/15 11:47 3/15 11:47 3/15 11:47 3/15 11:47    3/15 11:47  Range  (36.1C - 37.6C )  (77 - 116 )  (17 - 20 )  (127 - 171 )/ (66 - 84 )    (94% - 97% )  Highest temp of 37.6 C was recorded at 3/15 7:43      Pain reported at 3/15 12:52: 7 = Severe    ---- Intake and Output  -----  Mn/Dy/Year Time  Intake   Output  Net  Mar 15, 2023 2:00 pm  0   135  -135  Mar 15, 2023 6:00 am  0   722.5  -723  Mar 14, 2023 10:00 pm  1360   1180  180    The Intake and Output Totals for the last 24 hours are:      Intake   Output  Net      1360   2702  -1343    Physical Exam Narrative:  ·  Physical Exam:    Tolerating well pain postoperatively which extends from posterior cervical spine through upper back.   Decreased appetite  Needing scheduled pain medications. Denies extremity numbness/tingling.     Wife present during later afternoon follow-up rounds  Alert and oriented  No acute distress    Incision with wound VAC in place  Ends to the left  1 drain to the right  Drains contain serosanguineous fluid    Physical Exam by System:    Head/Neck: Neck  seen and examined. Clean wound dressing.  No numbness or tingling.   Respiratory/Thorax: Patent airways, CTAB, normal  breath sounds with good chest expansion, thorax symmetric   Cardiovascular: Regular, rate and rhythm, no murmurs,  2+ equal pulses of the extremities, normal S 1and S 2   Gastrointestinal: Nondistended, soft, non-tender,  no rebound tenderness or guarding, no masses palpable, no organomegaly, +BS, no bruits     Medication:    Medications:          Continuous Medications       --------------------------------  No continuous medications are active       Scheduled Medications       --------------------------------    1. amLODIPine (NORVASC):  5  mg  Oral  Daily    2. Cefepime IV Piggy Back:  2  gram(s)  IntraVenous Piggyback  Every 8 Hours    3. Cholecalciferol (Vitamin D3):  1000  International Unit(s)  Oral  Daily    4. Cyanocobalamin:  1000  microgram(s)  Oral  Daily    5. Docusate:  100  mg  Oral  2 Times a Day    6. Famotidine:  20  mg  Oral  2 Times a Day    7. Folic Acid:  1  mg  Oral  Daily    8. Gabapentin:  300  mg  Oral  3 Times a Day    9. Heparin SubCutaneous:  5000  unit(s)  SubCutaneous  Every 8 Hours    10. Multivitamin with Minerals:  1  tablet(s)  Oral  Daily    11. Nicotine 21 mg/ 24 hour TransDermal:  1  patch  TransDermal  Every 24 Hours    12. Sennosides:  2  tablet(s)  Oral  Daily    13. Thiamine:  100  mg  Oral  Daily    14. Vancomycin - RPh to Dose - IV Piggy Back:  1  each  As Specified  Variable    15. Vancomycin - RPh to Dose - IV Piggy Back:  1  each  As Specified  Variable    16. Vancomycin IV Piggy Back:  1250  mg  IntraVenous Piggyback  Every 12 Hours         PRN Medications       --------------------------------    1. Acetaminophen:  650  mg  Oral  Every 4 Hours    2. Bisacodyl Rectal:  10  mg  Rectal  Daily    3. Cyclobenzaprine:  10  mg  Oral  3 Times a Day    4. Fleet Adult (Sodium Phosphate) Rectal:  1  enema  Rectal  Daily    5. HYDROmorphone Injectable:  0.2  mg   IntraVenous Push  Every 4 Hours    6. Ondansetron Injectable:  4  mg  IntraVenous Push  Every 6 Hours    7. oxyCODONE Immediate Release:  5  mg  Oral  Every 4 Hours    8. oxyCODONE Immediate Release:  10  mg  Oral  Every 4 Hours    9. Polyethylene Glycol:  17  gram(s)  Oral  Daily        Recent Lab Results:    Results:        I have reviewed these laboratory results:    Culture, Miscellaneous, includes Gram Stain  Trending View      Result 14-Mar-2023 13:23:00  14-Mar-2023 13:22:00    Gram Stain NO GRANULOCYTES OR ORGANISMS SEEN.   2+ GRANULOCYTES. NO ORGANISMS SEEN.            I have reviewed these laboratory results:    Culture, Miscellaneous, includes Gram Stain  Trending View      Result 14-Mar-2023 13:23:00  14-Mar-2023 13:22:00    Gram Stain NO GRANULOCYTES OR ORGANISMS SEEN.   2+ GRANULOCYTES. NO ORGANISMS SEEN.          Radiology Results:    Results:        Impression:    1. Status post diaphragm function test.  2. Findings as stated above. Please see neurology report for full  details.      Xray Fluoroscopy Up To 1 Hr or less [Mar 14 2023 12:04PM]      Assessment and Plan:   Code Status:  ·  Code Status Full Code     Assessment:    Having pain postoperatively extending from posterior cervical spine through upper back. Decreased appetite this AM patient states is 2/2 neck and back pain. Utilizing  scheduled pain medications. Denies extremity numbness/tingling. Is ambulatory in cervical collar. Wound vac dressing intact to midline cervical spine without issue. Maintaining continuous suction at 125 mmHg without alarm, leak, obstruction or malfunction.  x3 TERRENCE drains with bloody serous drainage overnight.    DESTINY INFANTE is a 67 year old Male with PMHx of HTN and EtOH abuse, and cervical myelopathy secondary to a fall in December.   -After fall happened, he underwent 12/29/2022 C3-T1 PCDF with Dr. Bryant with remnant C5 palsy (unable to raise left hand above elbow level).   -After that he could feel lumps  "in his posterior neck, was diagnosed with hardware loosening and was followed up with Dr. Ulysses Mesa with scans and watchful waiting.   -When Dr. Mesa was travelling, Mr. Sharp had severe intolerable pain and had to undergo surgery with Dr. Ellis on 3/6 for hardware removal.   -Bulb drain was in place for first couple of days post OR but it was leaking and the bulb came out. Incision was opening and leaking so patient was transferred to  on 3/11 for plastic surgery consult and OR for flap.   -3/12 patient underwent wound washout and Vac placement only  -3/14 expected OR for flap and closure of dehiscence      Posterior Cervical wound dehiscence (no suspicion for meningitis)       Unclear what transpired postoperatively in December 2022 (we will try to locate some outpatient records)         Patient developed intractable pain and went to the OR on 3/6/2022 for removal of hardware.  Patient reports that the screws could be readily removed.  This suggests that there was significant loosening but operative note did not suggest ongoing infection.   Need to discuss with neurosurgery           3/12/23 OR note excerpt:       \" . . . Exposed spinous process, wound dehisced down to level of the dura. Small amount of superficial purulence, no deep purulence. . . \"       Specimens(s) Collected: yes,  Suprafascial, Subfascial                 Not clear what happened postoperatively to cause abrupt and rapid dehiscence.  if patient had issues with the TERRENCE drain and secondary subcutaneous buildup seroma or hematoma and secondary mechanical dehiscence.  Patient reports that TERRENCE drain fell  out on day of transfer.  Not certain if this happened as a result of dehiscence or was contributing to mechanical dehiscence.  ALSO, if this was infection and occurred that rapidly there should be some clinical suspicion of infection on examination or  in the OR.          3/12/2023 OR note was not suspicious of overt infection.         " Patient reported and his wife reported wound dehiscence that was leaking clear fluid as per wife, not green or brown.             3/15/23 update            Patient day 1 post op, is tolerating pain well.              3/14/23 Neurosurg OR note excerpt:             The wound was noted to have good granulation tissue. The prominent spinous processes were bitten down with a leksell rongeur, the remainder of the lamina were noted to  be healthy appearing.                   3/14/23 Plastic Surgery, Excerpt from postop note:            I therefore felt it was necessary to perform excisional debridement to the para-spinal muscles group and trapezius, bilaterally. With tenotomy scissors, non-viable tissue, and fibrotic  fibers were all removed until healthy, bleeding tissue was reached. Adequate hemostasis was achieved by the aid of bipolar. The wound was irrigated with copious amounts of NS0.9% . I sent specimen of the debrided tissue for culture.         During late afternoon rounds:   Dr. Wellington discussed with neurosurgery team.  Unusual hardware failure after initial surgery.  Ultimately had additional pain and required operative intervention where all hardware was readily removed but at that time there was no overt suspicion for  infection.  During 3/6/2023 admission to [facility], patient did well, and reports that TERRENCE drain fell out or was removed, and patient was discharged on 3/8/2023.  Wife reports prompt clear serous drainage from wound several days later at home.   That prompted patient to present to [facility] again with complete fascial dehiscence and complete exposure of the C7 spinous process.  Wound was irrigated and debrided on 3/12/2023.  Patient return to the OR for additional washout and plastic  surgery closure on 3/14/2023.  At this time cultures pending.  As discussed with neurosurgery no intraoperative suspicion for overt infection.  Continue current antibiotics awaiting final cultures.   Appears this may have been noninfectious mechanical  soft tissue dehiscence.    Recommendations:    - Continue Vanc/Cefipime    - Appreciate Pharm.D. assistance in dosing for trough levels 15 to 20 mcg/ML    - Followup on 3/12/23 culture and antibiotic susceptibilities and cultures taken from OR 3/14/23.    - If cultures negative then may not need antibiotics beyond usual post-op period    Will discuss and advise        For further questions, please call us (ID team B) on pager number [phone].    Cata Osborn MS4  and   HERLINDA Wellington MD  ID Staff  Pager [phone]    Dr. Wellington discussed in detail with patient, and his wife as well as primary team      Attestation:   Note Completion:  I am a:  Medical Student/Acting Intern   Medical Student Attestation I, or a resident under my supervision, was present with the medical student who participated in the documentation of this note.  I have personally seen and examined the patient and performed the medical decision-making components. I have reviewed the medical student documentation and/or resident documentation and verified the findings in the note as written with additions or exceptions  as stated in the body of this note.    I personally evaluated the patient on 15-Mar-2023         Electronic Signatures:  Alex Wellington)  (Signed 15-Mar-2023 21:11)   Authored: Subjective Data, Objective Data, Assessment  and Plan, Note Completion   Co-Signer: Service, Subjective Data, Objective Data, Assessment and Plan  Cata Osborn (MED [employer])  (Signed 15-Mar-2023 14:46)   Authored: Service, Subjective Data, Objective Data, Assessment  and Plan, Note Completion      Last Updated: 15-Mar-2023 21:11 by Alex Wellington)

## 2023-09-14 NOTE — PROGRESS NOTES
Service: Neurosurgery     Subjective Data:   DESTINY INFANTE is a 67 year old Male who is Hospital Day # 1.    Objective Data:     Objective Information:      T   P  R  BP   MAP  SpO2   Value  36.8  99  18  137/75      94%  Date/Time 3/11 20:27 3/11 20:27 3/11 20:27 3/11 20:27    3/11 20:27  Range  (36.7C - 36.9C )  (82 - 99 )  (16 - 18 )  (137 - 163 )/ (70 - 90 )    (94% - 95% )  Highest temp of 36.9 C was recorded at 3/11 11:30      Pain reported at 3/11 20:00: 7 = Severe    ---- Intake and Output  -----  Mn/Dy/Year Time  Intake   Output  Net  Mar 11, 2023 2:00 pm  0   0  0      Physical Exam by System:    Neurological: AOx3  L D/B/T 0 HG IO 5  RUE/BLE 5     Recent Lab Results:    Results:    CBC: 3/11/2023 11:52              \     Hgb     /                              \     10.2 L    /  WBC  ----------------  Plt               6.8       ----------------    296              /     Hct     \                              /     31.4 L    \            RBC: 3.56 L    MCV: 88           RFP: 3/11/2023 11:52  NA+        Cl-     BUN  /                         134 L   100    12  /  --------------------------------  Glucose                ---------------------------  113 H    K+     HCO3-   Creat \                         4.2    25    0.83  \  Calcium : 10.3Anion Gap : 13          Albumin : 3.9     Phos : 3.6      Coagulation: 3/11/2023 11:52  PT  /                    10.9  /  -------<    INR          ----------<      0.9  PTT\                    30  \                       Assessment and Plan:   Code Status:  ·  Code Status Full Code     Advance Care Planning:  Advance Care Planning: Patient didn't wish to or  was unable to provide advance care plan     Assessment:    67 M h/o HTN, EtOH abuse, cervical myelopathy 12/29 s/p C3-T1 PCDF (DD) c/b C5 palsy c/b hardware pullout 3/6 s/p hardware removal, revision C3 lami (JA), p/w wound  breakdown, CXR L hemidiaphragm elevation, CT CS neg for fluid  collection    Plan:  floor  OR SUn 3/12 cervical wound washout  collar  plastics recs  SNIFF test  BCX      Attestation:   Note Completion:  I am a:  Resident/Fellow   Attending Attestation I reviewed the resident/fellow?s documentation and discussed the patient with the resident/fellow.  I agree with the resident/fellow?s medical  decision making as documented in the note.          Electronic Signatures:  Kristian Rosas (Resident))  (Signed 12-Mar-2023 00:08)   Authored: Service, Subjective Data, Objective Data, Assessment  and Plan, Note Completion  Ulysses Mesa)  (Signed 12-Mar-2023 08:48)   Authored: Note Completion   Co-Signer: Service, Subjective Data, Objective Data, Assessment and Plan, Note Completion      Last Updated: 12-Mar-2023 08:48 by Ulysses Mesa)

## 2023-09-14 NOTE — PROGRESS NOTES
Service: Infectious Disease     Subjective Data:   DESTINY INFANTE is a 67 year old Male who is Hospital Day # 6 and POD #2 for 1. Excision and debridement down to and including spinous process bone;2. Paraspinous muscle flap;3. Trapezius muscle flap;4.  Complex closure;5. Refer to neurosurgery note.     Patient seen during midday rounds  Up in chair  No acute distress  Of course status postsurgical neck pain, described as moderate but controlled  No confusion, diplopia, nausea, vomiting, shortness of breath, chest pain  No diarrhea.    Objective Data:     Objective Information:      T   P  R  BP   MAP  SpO2   Value  36.8  92  15  132/79      97%  Date/Time 3/16 16:15 3/16 16:15 3/16 16:15 3/16 16:15    3/16 16:15  Range  (36C - 38.1C )  (73 - 108 )  (15 - 20 )  (120 - 160 )/ (59 - 85 )    (92% - 97% )  Highest temp of 38.1 C was recorded at 3/15 15:59      Pain reported at 3/16 14:43: 6 = Moderate    ---- Intake and Output  -----  Mn/Dy/Year Time  Intake   Output  Net  Mar 16, 2023 2:00 pm  0   530  -530  Mar 16, 2023 6:00 am  0   625  -625  Mar 15, 2023 10:00 pm  200   510  -310    The Intake and Output Totals for the last 24 hours are:      Intake   Output  Net      320   1270  -950    Physical Exam Narrative:  ·  Physical Exam:    Alert and oriented  Pleasant and interactive  Incisional wound VAC in place  Patient in neck brace  Inferior aspect of wound with wound VAC visible.  No obvious adjacent erythema  Lungs clear  S1, S2, I did not hear a murmur  Soft nontender abdomen  Normal right upper extremity handgrip  Left C5 palsy and arm weakness, but improving per patient.  He was able to raise his arm chair seat onto his lap  Heart decreased biceps strength  Fair to moderate strength in handgrip    Recent Lab Results:    Results:    CBC: 3/16/2023 04:54              \     Hgb     /                              \     8.8 L    /  WBC  ----------------  Plt               9.1       ----------------    286               /     Hct     \                              /     26.3 L    \            RBC: 3.00 L    MCV: 88           RFP: 3/16/2023 04:54  NA+        Cl-     BUN  /                         132 L   98    14  /  --------------------------------  Glucose                ---------------------------  109 H    K+     HCO3-   Creat \                         4.0    23    0.86  \  Calcium : 9.1Anion Gap : 15          Albumin : 3.0 L    Phos : 3.5      Assessment and Plan:   Code Status:  ·  Code Status Full Code     Assessment:        DESTINY SHARP is a 67 year old Male with PMHx of HTN and EtOH abuse, and cervical myelopathy secondary to a fall in December.   -After fall happened, he underwent 12/29/2022 C3-T1 PCDF with Dr. Bryant with remnant C5 palsy (unable to raise left hand above elbow level).   -After that he could feel lumps in his posterior neck, was diagnosed with hardware loosening and was followed up with Dr. Ulysses Mesa with scans and watchful waiting.   -When Dr. Mesa was travelling, Mr. Sharp had severe intolerable pain and had to undergo surgery with Dr. Ellis on 3/6 for hardware removal.   -Bulb drain was in place for first couple of days post OR but it was leaking and the bulb came out. Incision was opening and leaking so patient was transferred to  on 3/11 for plastic surgery consult and OR for flap.   -3/12 patient underwent wound washout and Vac placement only  -3/14 expected OR for flap and closure of dehiscence      Posterior Cervical wound dehiscence (no suspicion for meningitis)       Unclear what transpired postoperatively in December 2022 (we will try to locate some outpatient records)         Patient developed intractable pain and went to the OR on 3/6/2022 for removal of hardware.  Patient reports that the screws could be readily removed.  This suggests that there was significant loosening but operative note did not suggest ongoing infection.   Need to discuss with  "neurosurgery           3/12/23 OR note excerpt:       \" . . . Exposed spinous process, wound dehisced down to level of the dura. Small amount of superficial purulence, no deep purulence. . . \"       Specimens(s) Collected: yes,  Suprafascial, Subfascial               Not clear what happened postoperatively to cause abrupt and rapid dehiscence.  if patient had issues with the TERRENCE drain and secondary subcutaneous buildup seroma or hematoma and secondary mechanical dehiscence.  Patient reports that TERRENCE drain  fell out on day of transfer.  Not certain if this happened as a result of dehiscence or was contributing to mechanical dehiscence.  ALSO, if this was infection and occurred that rapidly there should be some clinical suspicion of infection on examination  or in the OR.               3/12/2023 OR note was not suspicious of overt infection.              Patient reported and his wife reported wound dehiscence that was leaking clear fluid as per wife, not green or brown.             3/15/23 update            Patient day 1 post op, is tolerating pain well.              3/14/23 Neurosurg OR note excerpt:             The wound was noted to have good granulation tissue. The prominent spinous processes were bitten down with a leksell rongeur, the remainder of the lamina were noted to  be healthy appearing.                   3/14/23 Plastic Surgery, Excerpt from postop note:            I therefore felt it was necessary to perform excisional debridement to the para-spinal muscles group and trapezius, bilaterally. With tenotomy scissors, non-viable tissue, and fibrotic  fibers were all removed until healthy, bleeding tissue was reached. Adequate hemostasis was achieved by the aid of bipolar. The wound was irrigated with copious amounts of NS0.9% . I sent specimen of the debrided tissue for culture.         During late afternoon rounds:   Dr. Wellington discussed with neurosurgery team.  Unusual hardware failure after initial " surgery.  Ultimately had additional pain and required operative intervention where all hardware was readily removed but at that time there was no overt suspicion for  infection.  During 3/6/2023 admission to [facility], patient did well, and reports that TERRENCE drain fell out or was removed, and patient was discharged on 3/8/2023.  Wife reports prompt clear serous drainage from wound several days later at home.   That prompted patient to present to [facility] again with complete fascial dehiscence and complete exposure of the C7 spinous process.  Wound was irrigated and debrided on 3/12/2023.  Patient return to the OR for additional washout and plastic  surgery closure on 3/14/2023.  At this time cultures pending.  As discussed with neurosurgery no intraoperative suspicion for overt infection.  Continue current antibiotics awaiting final cultures.  Appears this may have been noninfectious mechanical  soft tissue dehiscence.           3/16/2023 update:       Do not think there was an overt infection.  Given multiple surgeries and wound dehiscence would treat for a brief period of time covering general skin sergio but not atypical pathogens or  MDR organisms or Pseudomonas/MRSA would not have been missed  by culture.        THUS, would give 14-day course of oral Augmentin 875 mg p.o. twice daily and doxycycline 100 mg p.o. twice daily                3/12/2023 cultures negative, final.  3/14/2023 cultures negative to date      Recommendations:    -Discontinue vancomycin  -Discontinue cefepime  -Give patient 2-week supply of oral Augmentin 875 mg p.o. twice daily  -Give patient 2-week supply of doxycycline 100 mg p.o. twice daily  -Follow-up with plastic surgery  -Follow-up with neurosurgery  -No ID clinic follow-up needed.  Dr. Wellington available for questions if needed  -Discharge planning underway for today or 3/17/2023         Discussed with patient and primary team    HERLINDA Wellington MD  ID Staff  Pager  [phone]            Electronic Signatures:  Alex Wellington)  (Signed 16-Mar-2023 19:31)   Authored: Service, Subjective Data, Objective Data, Assessment  and Plan, Note Completion      Last Updated: 16-Mar-2023 19:31 by Alex Wellington)

## 2023-09-14 NOTE — H&P
History of Present Illness:   Admission Reason: Cervical wound breakdown   HPI:      HPI:  DESTINY INFANTE is a 67 year old Male with PMHx HTN, EtOh abuse, and cervical myelopathy after a fall on his driveway who is s/p 12/29/22 C3-T1 PCDF with Dr. Bryant  with palsy C5 and hardware pullout and s/p 3/6 hardware removal, revision C3 laminectomy  with Dr. Ellis who presents with posterior cervical wound breakdown. Patient is unaware of why he is being admitted to [facility] [place] [facility]. Patient also complains of LUE weakness to bicep, tricep, and delt which affects his ADLs. Patient unaware of exact timeline  of this presentation. Denies change in bowel / bladder function, saddle anesthesia, imbalance. Patient endorses no gait or lower extremity problems.       Past medical history: as above  Past surgical history: as above, previous lumbar surgery 2014 at [facility]    Social history:   Smoking: + tobacco use  Independent at baseline  +EtOh abuse    Family history:    Reviewed and found to be not relevant to the presenting complaint    Current medications:  Reviewed as noted in current orders     Allergies:  lisinopril, shellfish    ROS:    A full review of systems was obtained and all other systems are negative for complaint    Physical Exam:  Constitutional: Well developed male resting in bed, awake/alert/oriented x3, no distress, alert and cooperative.   Eyes: PERRL, EOMI, clear sclera   ENMT: mucous membranes moist, no apparent injury   Head/Neck: c-collar in place. Posterior cervical wound with approximately 5 cm dehiscence overlying incision with serous drainage onto ABD dressing. Trachea midline.   Respiratory/Thorax: Patent airways, normal breathing with good chest expansion, thorax symmetric   Gastrointestinal: Nondistended, firm, non-tender, eating breakfast without issue  Genitourinary: Voids via urinal Musculoskeletal: ROM intact, no joint swelling   Extremities: normal extremities, no cyanosis, edema, no noted  contusions or wounds   Neurological: alert and oriented x3, speech clear / fluent, no facial droop   PETTIT; LUE strength 0/5 to bicep/tricep/delt without twitch, 5/5 LUE  strength. RUE 5/5 Bilateral HF, DF, PF, KE 5/5. Sensory to light touch bilaterally intact to UE's.   Psychological: Appropriate mood and behavior   Skin: Warm and dry, healed abrasion to nose      Assessment and Plan:   DESTINY INFANTE is a 67 year old Male with PMHx HTN, EtOh abuse, and cervical myelopathy after a fall on his driveway who is s/p 12/29/22 C3-T1 PCDF with Dr. Bryant  with palsy C5 and hardware pullout and s/p 3/6 hardware removal, revision C3 laminectomy  with Dr. Ellis who presents with posterior cervical wound breakdown.    -Tentative plan for OR washout and closure of dehiscence with plastics on Monday  - Neuro: tylenol, oxy 5/10, dilaudid breakthrough  - FEN: regular diet  - ID: trend vitals/labs, monitor for s/s infection  - Embolic PPx: SQH, SCDs while in bed  - Dispo: continued care on tower 4    Note not final until signed by attending  Patient discussed with senior residents  Neurosurgery [id number]    Comorbidities:   Comorbidites:  ·  Comorbid Conditions hypertension            Allergies:  ·  lisinopril : Angioedema  ·  Shell  Fish: Anaphylaxis    Medications Prior to Admission:   Admission Medication Reconciliation has not been completed for this patient.    Objective:     Objective Information:      T   P  R  BP   MAP  SpO2   Value  36.8  82  16  160/90      95%  Date/Time 3/11 6:30 3/11 6:30 3/11 6:30 3/11 6:30    3/11 6:30  Range  (36.8C - 36.8C )  (82 - 93 )  (16 - 18 )  (160 - 160 )/ (70 - 90 )    (95% - 95% )      Pain reported at 3/11 6:32: 2 = Mild    Radiology Results:    Results:        Impression:    Multilevel degenerative changes of the cervical spine most prominent  at C4-5 where there is severe canal stenosis and associated edema  versus myelomalacia. There is also moderate to severe canal stenosis  at  C5-6 without definite associated edema or myelomalacia.     MRI Cervical without Contrast [Dec 27 2022 12:44PM]      Impression:    Multilevel degenerative changes of the lumbar spine most prominent at  L4-5 and L5-S1 where there appears to be contact of the traversing  right L5 and S1 nerve roots, respectively. Moderate canal stenosis at  L5-S1 and mild-to-moderate canal stenosis at L4-5.     No significant degenerative change of the thoracic spine.     No acute osseous abnormality. No high-grade canal stenosis throughout  the thoracic or lumbar spine.     MRI T Spine without Contrast [Dec 27 2022  8:48AM]      Impression:    Multilevel degenerative changes of the lumbar spine most prominent at  L4-5 and L5-S1 where there appears to be contact of the traversing  right L5 and S1 nerve roots, respectively. Moderate canal stenosis at  L5-S1 and mild-to-moderate canal stenosis at L4-5.     No significant degenerative change of the thoracic spine.     No acute osseous abnormality. No high-grade canal stenosis throughout  the thoracic or lumbar spine.     MRI L Spine without Contrast [Dec 27 2022  8:48AM]      Attestation:   Note Completion:  I am a:  Resident/Fellow   Attending Attestation I saw and evaluated the patient.  I personally obtained the key and critical portions of the history and physical exam or was physically present for key and  critical portions performed by the resident/fellow. I reviewed the resident/fellow?s documentation and discussed the patient with the resident/fellow.  I agree with the resident/fellow?s medical decision making as documented in the note.     I personally evaluated the patient on 11-Mar-2023         Electronic Signatures:  Lorenzo Mathew (Resident))  (Signed 11-Mar-2023 11:33)   Authored: History of Present Illness, Comorbidities,  Allergies, Medications Prior to Admission, Objective, Note Completion  Tucker Padilla)  (Signed 14-Mar-2023 13:08)   Authored: Note  Completion   Co-Signer: History of Present Illness, Comorbidities, Allergies, Medications Prior to Admission, Objective, Note Completion      Last Updated: 14-Mar-2023 13:08 by Tucker Padilla)

## 2023-10-02 NOTE — OP NOTE
PROCEDURE DETAILS      Postoperative Diagnosis:  Wound dehiscense  Surgeon: Tucker Padilla  Resident/Fellow/Other Assistant: None of these were associated with this case    Procedure:  1. Wound Washout and Vac Placement ONLY    Anesthesia: No anesthesiologist associated with this case  Estimated Blood Loss: 100cc  Findings: Exposed spinous process, wound dehisced down to level of the dura. Small amount of superficial purulence, no deep purulence  Specimens(s) Collected: yes,  Suprafascial, Subfascial   Implants: Wound vac                                Attestation:   Note Completion:  Attending Attestation I was present for key portions of the procedure and the procedure lasted longer than 5 minutes.    I am a: Resident/Fellow         Electronic Signatures:  Brooklynn Cloud (Resident))  (Signed 12-Mar-2023 12:03)   Authored: Post-Operative Note, Chart Review, Note Completion  Tucker Padilla)  (Signed 14-Mar-2023 13:28)   Authored: Note Completion   Co-Signer: Post-Operative Note, Chart Review, Note Completion      Last Updated: 14-Mar-2023 13:28 by Tucker Padilla)

## 2023-10-02 NOTE — OP NOTE
PROCEDURE DETAILS    Preoperative Diagnosis:  wound dehiscence  Postoperative Diagnosis:  wound dehiscence  Surgeon: Dr. Rusty Valiente  Resident/Fellow/Other Assistant: estiven Campbell    Procedure:  removal and debridement of spinous process  complex wound closure  Estimated Blood Loss: 5 for neurosurgical portion  Findings: good cosmesis of spinous process  Complications: none apparent        Operative Report:   Clinical Indications:  The patient is a 67 year old male with a history of recent posterior cervical hardware removal complicated by wound dehiscence. He underwent a wound washout with wound vac placement on March 12th. He now presents for staged closure of the wound with plastics.  Risks/benefits/alternatives of closure were discussed with the patient who wished to proceed.    Operative details:  The patient was brought to the operating room where a huddle was performed with the team. General anesthesia was obtained and intubation by the  anesthesia team. Patient was flipped prone onto a four post divine table, all pressure points padded. The wound vac was removed and the wound was prepped and draped in the usual sterile fashion. The wound was noted to have good granulation tissue. The  prominent spinous processes were bitten down with a leksell rongeur, the remainder of the lamina were noted to be healthy appearing. The wound was irrigated. Plastics surgery team took over for the complex closure of the wound, please see their dictation  for the details of their portion of the case.                        Attestation:   Note Completion:  Attending Attestation I was present for key portions of the procedure and the procedure lasted longer than 5 minutes.    I am a: Resident/Fellow         Electronic Signatures:  Zaina Oconnor (Resident))  (Signed 14-Mar-2023 12:19)   Authored: Post-Operative Note, Chart Review, Note Completion  ADRIANNA Valiente)  (Signed 17-Mar-2023 07:56)   Authored: Note  Completion   Co-Signer: Post-Operative Note, Chart Review, Note Completion      Last Updated: 17-Mar-2023 07:56 by ADRIANNA Valiente)

## 2023-10-02 NOTE — OP NOTE
Post Operative Note:     PreOp Diagnosis: Cervical Spine Wound Dehiscence   Post-Procedure Diagnosis: Cervical Spine Wound Dehiscence   Procedure: 1. Excision and debridement down to and  including paraspinal and trapezius muscles   2. Paraspinous muscle flap bilaterally  3. Trapezius muscle flap bilaterally  4. Complex closure  5. Incisional wound VAC  6. Refer to neurosurgery note   Surgeon: Dr. Ty Sanchez   Resident/Fellow/Other Assistant: Komal Atwood APRN/RNFA   Anesthesia: General   I.V. Fluids: 1 L   Estimated Blood Loss (mL): 100 mL   Specimen: yes. culture   Findings: Red/pink healthy muscle/tissue   Patient Returned To/Condition: PACU/Stable   Drains and/or Catheters: 15 fr channel x 3   Additional Details: Komal atwood served as my first  assistant.     Operative Report Dictated:  Dictation: not applicable - note contains Operative  Report    Operative Report:    Indication  DESTINY INFANTE is a 67 year old Male with PMHx significant for HTN, etoh abuse, and cervical myelopathy s/p fall in Dec 2022. His surgical history is significant for: 1-  C3-T1 PCDF on 12/29/22,  complicated with post-operative C5 pals. 2-hardware removal and revision C3 laminectomy on 03/06/2023, complicated with drainage from his incision shortly after discharge and progressed to complete wound dehiscence on 03/10. 3-I&D on 03- with placement  of irrigation wound VAC based on my recommendation.   He is indicated for spinal wound dehiscence coverage with pedicle muscle flaps.  INFORMED CONSENT  Patient was told the risks, benefits, INDICATIONS, contraindications, and alternatives to the procedure. Risks include but not limited to infection, bleeding, hematoma, seroma, injury to neurovascular structures, wound healing issues, pain, and need for  additional surgeries. Medical complications from GA including heart attack, stroke, breathing problems and reaction to medication, DVT and PE, and up to mortality were  discussed.  The patient demonstrated understanding of these risks and agreed to proceed with surgery. Advance directives discussed. Team approach explained.    The patient consented and wished to proceed with the procedure and for medical photography if needed.   PROCEDURAL PAUSE  Prior to the beginning of the procedure, the patient's correct identity, side, site, and procedure to be performed were verified.  The patient was given intravenous antibiotics prior to our procedure according to ID recommendations.  (This was done by Dr. Valiente).  PROCEDURAL NOTE  The patient was transferred from the preop holding area to the OR and placed on the OR table in a secure prone position. The VAC was removed and then the wound was prepped and draped in sterile aseptic technique. SCDs were applied to both legs. An appropriate  timeout was performed, all in the room were in agreement. The patient was given vancomycin per ID recommendations.  Dr. Valiente started his part first, and removed additional bone from the spinal process (please refer to his repot).   After Dr. Valiente has completed his part of the surgery, I took over. I first assessed the wound. The wound measured 15 cm in length, 5 cm in width, and 6 cm in depth. The wound was clean but I could note foci of nonviable tissue, fibrin and debris especially  at the deeper aspects of the wound.   I therefore felt it was necessary to perform excisional debridement to the para-spinal muscles group and trapezius, bilaterally. With  tenotomy scissors, non-viable tissue, and fibrotic fibers were all removed until healthy, bleeding tissue was reached. Adequate hemostasis was achieved by the aid of bipolar. The wound was irrigated with copious amounts of NS0.9%. I sent specimen of the  debrided tissue for culture.   After that I shifted my focus on flap development and mobilization, starting with the left side, and then repeating the same steps again on the right side as  follows:  -Para-spinal muscles group flap: This was a bilateral procedure. The left side para-spinal muscles group was dissected off its origin using bovie, lateral perforators were encountered and controlled with bipolar, dissection proceeded laterally until medial  perforators were encountered. The paraspinal muscles flap was then mobilized on its medial vascular perforators to adequate degree to cross the midline without tension. Same procedure was repeated on the right side.   -Trapezius muscle flap: This again was bilateral procedure. The left side trapezius muscle was identified. Its midline edge was grasped  by allis clamp, and then the muscle was dissected off the para-spinal muscles group below the trapezius and the back fascia with sits skin above it. The Trapezius was then mobilized as a pedicle muscle to cross the midline, tension-free. Muscular perforators  through the trapezius were controlled with bipolar. Trapezius pedicle was not damaged.  The same procedure was repeated on the right side.    The skin over the trapezius was dissected for at least 5-6 cm, which was adequate for tension-free closure.   Next, I performed complex closure of the prepared muscle flaps, as follows:  The para-spinal muscles flaps on both sides were repaired with two layers of 1 PDS, and 15 fr drain was placed between the vertebral bone and the para-spinal muscles.   After that, the trapezius muscles were repair using PPDS 0, and a 15 fr drain was placed under the trapezius muscles but above the para-spinal muscles flaps.   Finally, the skin and back fascia were repaired in two layers using 2/0 PDS and 3/0 Monocryl: 2/0 PDS was used to repair the fascia, subcutaneous tissue, and deep dermis (in one bite) on each side of the wound, while 3/0 Monocryl was used for the skin  in subcuticular technique. Third 15 fr drain was placed under the skin, above the trapezius layer closure.   Finally, previna incisional wound VAC was  applied. Settings: 125 mmhg, continuous.   Patient tolerated surgery well, and was successfully awakened from surgery. He was then transferred to PACU in stable condition.  Post-operative plan:  Patient will return to neurosurgery care, serving as primary. Our team will monitor the drains and VAC. Pain management per primary. Antibiotics per ID. VAC to stay 10 days (hospital VAC then portable pump when he is ready for discharge).            Attestation:   Note Completion:  Attending Attestation I performed the procedure without a resident          Electronic Signatures:  Ty Robles)  (Signed 14-Mar-2023 18:05)   Authored: Post Operative Note, Note Completion   Co-Signer: Post Operative Note, Note Completion  Komal Kuhn (APRN-CNP)  (Signed 14-Mar-2023 15:32)   Authored: Post Operative Note, Note Completion      Last Updated: 14-Mar-2023 18:05 by Ty Roblse)

## 2023-10-24 ENCOUNTER — APPOINTMENT (OUTPATIENT)
Dept: PRIMARY CARE | Facility: CLINIC | Age: 68
End: 2023-10-24
Payer: MEDICARE

## 2023-10-27 ENCOUNTER — APPOINTMENT (OUTPATIENT)
Dept: PRIMARY CARE | Facility: CLINIC | Age: 68
End: 2023-10-27
Payer: MEDICARE

## 2023-11-09 ENCOUNTER — PATIENT OUTREACH (OUTPATIENT)
Dept: CARE COORDINATION | Facility: CLINIC | Age: 68
End: 2023-11-09
Payer: MEDICARE

## 2023-11-09 DIAGNOSIS — S22.43XA CLOSED FRACTURE OF MULTIPLE RIBS OF BOTH SIDES, INITIAL ENCOUNTER: ICD-10-CM

## 2023-11-09 RX ORDER — OXYCODONE HYDROCHLORIDE 5 MG/1
5 TABLET ORAL EVERY 6 HOURS PRN
COMMUNITY
End: 2023-11-13 | Stop reason: ALTCHOICE

## 2023-11-09 RX ORDER — LIDOCAINE 560 MG/1
1 PATCH PERCUTANEOUS; TOPICAL; TRANSDERMAL DAILY
COMMUNITY
End: 2023-11-13 | Stop reason: ALTCHOICE

## 2023-11-09 RX ORDER — SENNOSIDES 8.6 MG/1
1 TABLET ORAL DAILY
COMMUNITY

## 2023-11-09 NOTE — PROGRESS NOTES
Discharge Facility:[facility]  Discharge Diagnosis:Multiple fractures of ribs, right side, initial encounter for closed fracture   Multiple fractures of ribs, left side, initial encounter for closed fracture  Acute pain due to trauma  Fall   Admission Date:11.7.23  Discharge Date: 11.8.23    PCP Appointment Date:11.13.23  Specialist Appointment Date: Neuro 11.30.23  Hospital Encounter and Summary: Linked   See discharge assessment below for further details  Engagement  Call Start Time: 1432 (11/9/2023  2:32 PM)    Medications  Medications reviewed with patient/caregiver?: Yes (11/9/2023  2:32 PM)  Is the patient having any side effects they believe may be caused by any medication additions or changes?: No (11/9/2023  2:32 PM)  Does the patient have all medications ordered at discharge?: Yes (11/9/2023  2:32 PM)  Care Management Interventions: No intervention needed (11/9/2023  2:32 PM)  Is the patient taking all medications as directed (includes completed medication regime)?: Yes (11/9/2023  2:32 PM)  Care Management Interventions: Provided patient education (11/9/2023  2:32 PM)  Medication Comments: messaged clinical office staff. Per patient out of pain med. (11/9/2023  2:32 PM)    Appointments  Does the patient have a primary care provider?: Yes (11/9/2023  2:32 PM)  Care Management Interventions: Verified appointment date/time/provider (11/9/2023  2:32 PM)  Has the patient kept scheduled appointments due by today?: Yes (11/9/2023  2:32 PM)  Care Management Interventions: Advised patient to keep appointment (11/9/2023  2:32 PM)    Self Management  What is the home health agency?: n/a (11/9/2023  2:32 PM)  What Durable Medical Equipment (DME) was ordered?: n/a (11/9/2023  2:32 PM)    Patient Teaching  Does the patient have access to their discharge instructions?: Yes (11/9/2023  2:32 PM)  Care Management Interventions: Reviewed instructions with patient (11/9/2023  2:32 PM)  What is the patient's perception of their  health status since discharge?: Same (11/9/2023  2:32 PM)  Is the patient/caregiver able to teach back the hierarchy of who to call/visit for symptoms/problems? PCP, Specialist, Home Health nurse, Urgent Care, ED, 911: Yes (11/9/2023  2:32 PM)  Patient/Caregiver Education Comments: Spoke with patient. Experiencing pain and almost out of pain med. Messaged office. Also to follow with neuro. He states it does hhurt to breathe. educated on the importanc of trying to deep breathe. Arranged follow up appt. (11/9/2023  2:32 PM)

## 2023-11-13 ENCOUNTER — OFFICE VISIT (OUTPATIENT)
Dept: PRIMARY CARE | Facility: CLINIC | Age: 68
End: 2023-11-13
Payer: MEDICARE

## 2023-11-13 ENCOUNTER — HOSPITAL ENCOUNTER (OUTPATIENT)
Dept: RADIOLOGY | Facility: EXTERNAL LOCATION | Age: 68
Discharge: HOME | End: 2023-11-13

## 2023-11-13 VITALS
TEMPERATURE: 98.3 F | SYSTOLIC BLOOD PRESSURE: 132 MMHG | BODY MASS INDEX: 26.88 KG/M2 | HEART RATE: 99 BPM | OXYGEN SATURATION: 92 % | HEIGHT: 71 IN | DIASTOLIC BLOOD PRESSURE: 66 MMHG | WEIGHT: 192 LBS

## 2023-11-13 DIAGNOSIS — R29.6 MULTIPLE FALLS: Primary | ICD-10-CM

## 2023-11-13 DIAGNOSIS — S09.93XD FACIAL INJURY, SUBSEQUENT ENCOUNTER: ICD-10-CM

## 2023-11-13 DIAGNOSIS — I10 BENIGN ESSENTIAL HYPERTENSION: ICD-10-CM

## 2023-11-13 DIAGNOSIS — R29.6 FALLS FREQUENTLY: ICD-10-CM

## 2023-11-13 DIAGNOSIS — F10.10 ALCOHOL ABUSE: ICD-10-CM

## 2023-11-13 DIAGNOSIS — E78.2 HYPERLIPEMIA, MIXED: ICD-10-CM

## 2023-11-13 DIAGNOSIS — S22.41XD CLOSED FRACTURE OF MULTIPLE RIBS OF RIGHT SIDE WITH ROUTINE HEALING: ICD-10-CM

## 2023-11-13 DIAGNOSIS — G62.9 NEUROPATHY: ICD-10-CM

## 2023-11-13 DIAGNOSIS — R07.81 RIB PAIN ON RIGHT SIDE: ICD-10-CM

## 2023-11-13 PROBLEM — T81.31XA DEHISCENCE OF SURGICAL WOUND: Status: RESOLVED | Noted: 2023-03-10 | Resolved: 2023-11-13

## 2023-11-13 PROBLEM — E55.9 VITAMIN D DEFICIENCY: Status: RESOLVED | Noted: 2023-08-24 | Resolved: 2023-11-13

## 2023-11-13 PROBLEM — Z12.11 COLON CANCER SCREENING: Status: RESOLVED | Noted: 2023-08-24 | Resolved: 2023-11-13

## 2023-11-13 PROBLEM — F41.1 ANXIETY STATE: Status: RESOLVED | Noted: 2023-04-04 | Resolved: 2023-11-13

## 2023-11-13 PROBLEM — M25.422 EFFUSION OF BURSA OF LEFT ELBOW: Status: RESOLVED | Noted: 2023-04-10 | Resolved: 2023-11-13

## 2023-11-13 PROBLEM — D50.0 IRON DEFICIENCY ANEMIA DUE TO CHRONIC BLOOD LOSS: Status: RESOLVED | Noted: 2023-05-23 | Resolved: 2023-11-13

## 2023-11-13 PROBLEM — M10.9 GOUT OF LEFT ELBOW: Status: RESOLVED | Noted: 2023-04-04 | Resolved: 2023-11-13

## 2023-11-13 PROBLEM — S09.93XA FACIAL INJURY: Status: ACTIVE | Noted: 2023-11-13

## 2023-11-13 PROBLEM — R29.898 LEFT ARM WEAKNESS: Status: RESOLVED | Noted: 2023-04-10 | Resolved: 2023-11-13

## 2023-11-13 PROCEDURE — 3078F DIAST BP <80 MM HG: CPT | Performed by: INTERNAL MEDICINE

## 2023-11-13 PROCEDURE — 3075F SYST BP GE 130 - 139MM HG: CPT | Performed by: INTERNAL MEDICINE

## 2023-11-13 PROCEDURE — 1160F RVW MEDS BY RX/DR IN RCRD: CPT | Performed by: INTERNAL MEDICINE

## 2023-11-13 PROCEDURE — 1036F TOBACCO NON-USER: CPT | Performed by: INTERNAL MEDICINE

## 2023-11-13 PROCEDURE — 1125F AMNT PAIN NOTED PAIN PRSNT: CPT | Performed by: INTERNAL MEDICINE

## 2023-11-13 PROCEDURE — 99214 OFFICE O/P EST MOD 30 MIN: CPT | Performed by: INTERNAL MEDICINE

## 2023-11-13 PROCEDURE — 1159F MED LIST DOCD IN RCRD: CPT | Performed by: INTERNAL MEDICINE

## 2023-11-13 RX ORDER — CYCLOBENZAPRINE HCL 5 MG
5 TABLET ORAL 3 TIMES DAILY
Qty: 21 TABLET | Refills: 0 | Status: SHIPPED | OUTPATIENT
Start: 2023-11-13 | End: 2023-11-30 | Stop reason: WASHOUT

## 2023-11-13 RX ORDER — METHYLPREDNISOLONE 4 MG/1
TABLET ORAL
Qty: 21 TABLET | Refills: 0 | Status: SHIPPED | OUTPATIENT
Start: 2023-11-13 | End: 2023-11-20

## 2023-11-13 NOTE — PROGRESS NOTES
Subjective   Patient ID: Yoan Sharp is a 68 y.o. male who presents for Follow-up (Hospital follow up from a fall ).    Assessment/Plan     Problem List Items Addressed This Visit       Alcohol abuse     B12 folic acid thiamine         Relevant Orders    CBC and Auto Differential    Comprehensive Metabolic Panel    CK    XR chest 2 views (Completed)    Hyperlipemia, mixed    Benign essential hypertension     Patients BP readings reviewed and addressed, as we age our arteries turn stiffer and less elastic. Restricting salt consumption and staying physically fit with regular exercise regimen is the only way to keep our vasculature less tonic. Studies have shown that keeping ideal body wt, exercise routine about 140 to 150 minutes a week, eating variety of plant based diet and drinking plentiful water are quite helpful. Monitor BP twice or once a week at home and bring log to be reviewed by me. Uncontrolled BP has long term consequences including heart failure, myocardial infarction, accelerated atherosclerosis and kidney dysfunction. Therapy reviewed and explained.           Neuropathy     Neuromuscular pain given Medrol Dosepak Flexeril gabapentin physical Occupational Therapy follow-up         Falls frequently - Primary     Refer to gait and balance therapy         Relevant Orders    CBC and Auto Differential    Comprehensive Metabolic Panel    CK    XR chest 2 views (Completed)    Facial injury    Closed fracture of multiple ribs of right side with routine healing     Vitamin C vitamin D calcium supplement repeat chest x-ray         Rib pain on right side    Relevant Orders    CBC and Auto Differential    Comprehensive Metabolic Panel    CK    XR chest 2 views (Completed)   Repeat blood pressure 132/66  Patient was evaluated today, problem list was reviewed, problems and concerns addressed, Rx list reviewed and updated, lab and tests were noted and reviewed. Life style changes were discussed, always it works  better if we eat plant based diet and plenty of fibres and roughage. Consume adequate amount of water and avoid alcohol, light to moderate physical activities and stress reduction are always beneficial for ongoing physical well being. Do not forget to have 6 to 7 hours of sleep regularly and avoid late night wilmer screen exposure.    HPI  This is a 68-year-old patient who had a history of alcohol abuse hypertension hyperlipidemia chronic neck pain back pain weakness and emergency room hospitalization on November 8 discharged on November 9 to review hospital record accidental fall with a facial injury right-sided rib pain face pain underwent for the CAT scan blood test review complaining of the muscle spasm neuromuscular pain or discomfort    Negative for headache    Negative for chest pain    Negative for hypoxia hypotension    Negative for alcohol or smoking    Arthritis acetaminophen    Gouty arthritis allopurinol    Hypertension amlodipine    Hyperlipidemia Lipitor    Neuropathy gabapentin    Proteinuria Cozaar    Alcohol B12 folic acid thiamine    Patient hospitalized since last visit medication list reviewed and  reconciled with discharge medications  History reviewed. No pertinent past medical history.  Past Surgical History:   Procedure Laterality Date    CERVICAL FUSION  12/30/2022    HARDWARE REMOVAL  03/06/2023    WOUND DEBRIDEMENT       Allergies   Allergen Reactions    Iodine Unknown    Lisinopril Swelling and Unknown     Swelling of the face    Shellfish Containing Products Unknown and Other     Current Outpatient Medications   Medication Sig Dispense Refill    acetaminophen (Tylenol) 325 mg tablet Take by mouth every 6 hours if needed for mild pain (1 - 3).      allopurinol (Zyloprim) 100 mg tablet Take 1 tablet (100 mg) by mouth in the morning and 1 tablet (100 mg) before bedtime. 60 tablet 11    amLODIPine (Norvasc) 5 mg tablet Take 1 tablet (5 mg) by mouth once daily. 90 tablet 3    atorvastatin  (Lipitor) 20 mg tablet Take 1 tablet (20 mg) by mouth once daily. 90 tablet 3    cloNIDine (Catapres) 0.1 mg tablet Take by mouth twice a day.      cyclobenzaprine (Flexeril) 10 mg tablet Take 1 tablet (10 mg) by mouth 2 times a day as needed for muscle spasms. 60 tablet 0    ergocalciferol (Vitamin D-2) 1.25 MG (13948 UT) capsule Take 1 capsule (50,000 Units) by mouth 1 (one) time per week. 12 capsule 0    folic acid (Folvite) 1 mg tablet Take 1 tablet (1 mg) by mouth once daily. 90 tablet 3    gabapentin (Neurontin) 300 mg capsule Take 1 capsule (300 mg) by mouth once daily in the morning AND 2 capsules (600 mg) once daily in the evening. 270 capsule 3    losartan (Cozaar) 100 mg tablet Take 1 tablet (100 mg) by mouth once daily. 90 tablet 3    sennosides (Senokot) 8.6 mg tablet Take 1 tablet (8.6 mg) by mouth once daily. 1 Tablet by mouth at bedtime for 7 days      thiamine 100 mg tablet Take 1 tablet (100 mg) by mouth once daily.       No current facility-administered medications for this visit.     Family History   Problem Relation Name Age of Onset    Heart disease Father       Social History     Socioeconomic History    Marital status:      Spouse name: None    Number of children: None    Years of education: None    Highest education level: None   Occupational History    None   Tobacco Use    Smoking status: Former     Types: Cigarettes    Smokeless tobacco: Never   Substance and Sexual Activity    Alcohol use: Never    Drug use: Never    Sexual activity: None   Other Topics Concern    None   Social History Narrative    None     Social Determinants of Health     Financial Resource Strain: Not on file   Food Insecurity: Not on file   Transportation Needs: Not on file   Physical Activity: Not on file   Stress: Not on file   Social Connections: Not on file   Intimate Partner Violence: Not on file   Housing Stability: Not on file     Immunization History   Administered Date(s) Administered    Flu vaccine,  "quadrivalent, high-dose, preservative free, age 65y+ (FLUZONE) 11/05/2020    Influenza, Unspecified 11/05/2020    Moderna SARS-CoV-2 Vaccination 03/03/2021, 04/07/2021, 11/19/2021    Pneumococcal polysaccharide vaccine, 23-valent, age 2 years and older (PNEUMOVAX 23) 10/09/2018    Tdap vaccine, age 7 year and older (BOOSTRIX) 06/29/2022       Review of Systems  Review of systems is otherwise negative unless stated above or in history of present illness.    Objective   Visit Vitals  /81 (BP Location: Left arm, Patient Position: Sitting, BP Cuff Size: Large adult)   Pulse 99   Temp 36.8 °C (98.3 °F)   Ht 1.803 m (5' 11\")   Wt 87.1 kg (192 lb)   SpO2 92%   BMI 26.78 kg/m²   Smoking Status Former   BSA 2.09 m²     Physical Exam  Constitutional: BMI 26     General: not in acute distress.   HENT: Facial injury without infection     Head: Normocephalic and atraumatic.      Nose: Nose normal.   Eyes:      Extraocular Movements: Extraocular movements intact.      Conjunctiva/sclera: Conjunctivae normal.   Cardiovascular: Heart murmur     Rate and Rhythm: Normal rate ,  No M/R/G  Pulmonary: Rhonchi     Effort: Pulmonary effort is normal.      Breath sounds: Normal, Bilat Equal AE  Skin: Bruises cervical lumbar radiculopathy     General: Skin is warm.   Neurological:      Mental Status: He is alert and oriented to person, place, and time.   Psychiatric:    Anxiety     Mood and Affect: Mood normal.         Behavior: Behavior normal.   Musculoskeletal posttraumatic rib tenderness right side  FROM in all extremitirs,  Joint-no swelling or tenderness    No visits with results within 4 Month(s) from this visit.   Latest known visit with results is:   Lab on 04/10/2023   Component Date Value Ref Range Status    Uric Acid 04/10/2023 4.5  4.0 - 7.5 mg/dL Final    Rheumatoid Factor 04/10/2023 13  0 - 15 IU/mL Final    ANTI-NUCLEAR ANTIBODY (TEZ) 04/10/2023 NEGATIVE  NEGATIVE Final    Sedimentation Rate 04/10/2023 30 (H)  0 - 20 " mm/h Final       Radiology: Reviewed imaging in powerchart.  XR chest 2 views    Result Date: 11/13/2023  These images are not reportable by radiology and will not be interpreted by  Radiologists.    CT chest abdomen pelvis wo IV contrast    Result Date: 11/7/2023  EXAMINATION: CT CHEST/ABD/PELVIS W/O CONTRAST 11/07/2023 02:00 PM CLINICAL HISTORY: Fall ASSOCIATED DIAGNOSIS: Fall ORDERING PROVIDER: KRISTYN GAMBOA TECHNOLOGISTS NOTE: COMPARISON: None TECHNIQUE: Contiguous axial images were obtained through the chest, abdomen and pelvis without contrast from the level of the thoracic inlet to the pubic symphysis. MPR sagittal and coronal reconstructions were obtained from the axial data. INTRA-PROCEDURE MEDS: FINDINGS: Cardiovasculature: The heart is normal in size. Severe three-vessel coronary calcification. Atherosclerotic calcifications within the thoracic aorta. Mediastinum/Pericardium: Unremarkable Pleura: Unremarkable Central Airways: Widely patent Lungs: No focal consolidation. Nodules: No nodules are present that require follow up. Lymph Nodes: No thoracic lymphadenopathy is evident. Hepatobiliary: The liver parenchyma has low attenuation consistent with hepatic steatosis. No focal suspicious hepatic lesion is present. There is no biliary dilatation. Pancreas: Unremarkable Spleen: Unremarkable Adrenal Glands: Unremarkable Kidneys, ureters, and bladder: No calculi or hydroureteronephrosis. Abdominal and pelvic vasculature: Atherosclerotic wall calcifications are present without aneurysm. GI tract: No evidence of obstruction. The appendix is within normal limits. Peritoneum and retroperitoneum: No free fluid or free air is noted. Fat-containing direct left inguinal hernia. Lymph Nodes: No abdominal or pelvic lymphadenopathy is evident. Prostate and seminal vesicles: Unremarkable Visualized musculoskeletal structures:  Acute, subacute and chronic appearing nondisplaced and mildly displaced fractures of bilateral  ribs, involving right ribs 3-7, and 10-11; involving left ribs 3-7 and 9-12. IMPRESSION: 1.  Acute, subacute and chronic appearing nondisplaced and mildly displaced fracture deformities of bilateral ribs, involving right ribs 3-7, and 10-11; involving left ribs 3-7 and 9-12. 2.  Severe coronary calcification. 3.  Hepatic steatosis. MACRO: None    CT T-SPINE/L-SPINE W/O CONTRAST    Result Date: 11/7/2023  EXAMINATION: CT T-SPINE/L-SPINE W/O CONTRASTPRO/PRO   11/07/2023 02:00 PM CLINICAL HISTORY: Fall ASSOCIATED DIAGNOSIS: Fall ORDERING PROVIDER: KRISTYN GAMBOA TECHNOLOGISTS NOTE: COMPARISON: None TECHNIQUE: Thin axial images were obtained through the thoracic and lumbar spine without intravenous contrast. 2D sagittal and coronal reconstructions were obtained from the axial data. FINDINGS: Counting reference: Lumbosacral junction. L4-5 is at the level of the iliac crests, and there are 5 nonrib-bearing lumbar-type vertebral bodies. Vertebrae: No acute fracture or traumatic malalignment. No aggressive osseous lesions. Mild levoconvex curvature of the lumbar spine and slight grade 1 degenerative anterolisthesis at L4-5. Chronic and benign-appearing mild anterior wedging of mid thoracic vertebrae. Moderate multilevel disc space narrowing and endplate spurring greatest at L4-5 and L5-S1. Mild eccentric spinal canal stenosis with preferential narrowing of the right subarticular recess due to grade 1 degenerative anterolisthesis, endplate spurring, and posterior element hypertrophy at L4-5. Visible sacrum and pelvis: No acute abnormality. No dorsal paraspinous, paravertebral, or prevertebral fluid collection. Dense triple vessel coronary artery calcifications. Hepatic steatosis. Extensive calcific atheromatous irregularity of the aortoiliac vasculature without aneurysm. Please see dedicated CT of the chest, abdomen, and pelvis for complete intrathoracic and abdominopelvic findings. IMPRESSION: No acute fracture or traumatic  malalignment of the thoracic or lumbar spine. MACRO: None    CT cervical spine wo IV contrast    Result Date: 11/7/2023  EXAMINATION: CT C-SPINE W/O CONTRAST 11/07/2023 01:37 PM CLINICAL HISTORY: Fall ASSOCIATED DIAGNOSIS: Fall ORDERING PROVIDER: KRISTYN FRANCIS NOTE:  Pt unable to follow directions, told multiple times to hold still. Best possible due to current pt condition COMPARISON: None TECHNIQUE: Thin isotropic axial images were obtained from the skull base to the upper thoracic spine without intravenous contrast. 2D sagittal and coronal reconstructions were obtained from the axial data. FINDINGS: Counting reference: Craniocervical junction. Anatomic variants: None. Vertebrae: No acute fracture or traumatic malalignment. No aggressive osseous lesions. Minimal degenerative anterolisthesis at C3-4. Moderate multilevel degenerative disc space narrowing with endplate spurring greatest at C3-4, C5-6 and C6-7. Postoperative changes related to dorsal decompression at C3-4 through C6-7. There is ventral cord indentation at C3-4 due to degenerative anterolisthesis and endplate spurring. Multilevel facet hypertrophy and uncovertebral spurring with severe neural foraminal stenoses at C3-4, C4-5, and C6-7. Soft Tissues: Disruption of the dorsal paraspinous soft tissues in the laminectomy bed. No dorsal paraspinous, paravertebral, or prevertebral fluid collection. Atheromatous ossifications of the carotid bifurcations. No mass or focal consolidation in the included upper lungs. IMPRESSION: No acute cervical spine fracture or traumatic malalignment. MACRO: None    CT maxillofacial bones wo IV contrast    Result Date: 11/7/2023  EXAMINATION: CT FACIAL BONES W/O CONTRAST 11/07/2023 01:37 PM CLINICAL HISTORY: Fall ASSOCIATED DIAGNOSIS: Fall ORDERING PROVIDER: KRISTYN FRANCIS NOTE:  Pt unable to follow directions, told multiple times to hold still. Best possible due to current pt condition COMPARISON:  None TECHNIQUE: Thin isotropic axial images were obtained through the maxillofacial area without intravenous contrast. 2D sagittal and coronal reconstructions were obtained from the axial data. FINDINGS: Motion degraded exam. Facial bones: No acute facial fracture within constraints of motion. Mandible and TMJs: Cortical irregularity at each mandibular coronoid process is attributed to motion.. Orbits: No globe rupture or retrobulbar hematoma. Moderate right preseptal periorbital hematoma with swelling extending along the right premalar soft tissues. Sinuses: Mild mucosal thickening in the maxillary sinuses. Left frontal scalp hematoma. IMPRESSION: No acute facial fracture or retrobulbar hematoma within constraints imposed by motion. MACRO: None    CT head wo IV contrast    Result Date: 11/7/2023  EXAMINATION: CT HEAD W/O CONTRAST 11/07/2023 01:37 PM CLINICAL HISTORY: Fall ASSOCIATED DIAGNOSIS: Fall ORDERING PROVIDER: KRISTYN GAMBOA TECHNOLOGISTS NOTE:  Pt unable to follow directions, told multiple times to hold still. Best possible due to current pt condition COMPARISON: None TECHNIQUE: Thin axial imaging of the head was performed without intravenous contrast. FINDINGS: The examination is degraded by motion artifact. Thin parafalcine hyperdensity likely relates to motion artifact. No parenchymal contusion. No CT evidence of acute territorial infarction. No mass, significant mass effect, or extra-axial fluid collection. Moderate generalized brain parenchymal volume loss with commensurate ventricular caliber. Patchy hypodensities in supratentorial white matter are nonspecific but favor sequelae of mild chronic small vessel ischemia. Atheromatous calcifications of the carotid siphons. No acute abnormality of the skull base or calvarium within constraints of motion. Paranasal sinuses and tympanomastoid cavities are unopacified. Left frontal scalp and right preseptal periorbital hematomas. CT of the facial bones was  performed concurrently and will be reported separately. IMPRESSION: Motion degraded exam. Thin parafalcine hyperdensity likely relates to motion artifact and dural calcifications. No parenchymal contusion. Left frontal scalp and right facial hematomas. MACRO: None        Charting was completed using voice recognition technology and may include unintended errors.

## 2023-11-16 ENCOUNTER — PATIENT OUTREACH (OUTPATIENT)
Dept: CARE COORDINATION | Facility: CLINIC | Age: 68
End: 2023-11-16
Payer: MEDICARE

## 2023-11-16 NOTE — PROGRESS NOTES
Unable to reach patient for call back two weeks after discharge. No PCP follow up.  LVM  with call back number for patient to call if needed.

## 2023-11-30 ENCOUNTER — OFFICE VISIT (OUTPATIENT)
Dept: NEUROSURGERY | Facility: CLINIC | Age: 68
End: 2023-11-30
Payer: MEDICARE

## 2023-11-30 VITALS
HEIGHT: 71 IN | DIASTOLIC BLOOD PRESSURE: 80 MMHG | TEMPERATURE: 98.2 F | HEART RATE: 114 BPM | BODY MASS INDEX: 26.46 KG/M2 | WEIGHT: 189 LBS | SYSTOLIC BLOOD PRESSURE: 162 MMHG

## 2023-11-30 DIAGNOSIS — M47.12 CERVICAL SPONDYLOSIS WITH MYELOPATHY: Primary | ICD-10-CM

## 2023-11-30 DIAGNOSIS — M54.12 LEFT CERVICAL RADICULOPATHY: Primary | ICD-10-CM

## 2023-11-30 DIAGNOSIS — M75.02 ADHESIVE CAPSULITIS OF LEFT SHOULDER: ICD-10-CM

## 2023-11-30 PROCEDURE — 3077F SYST BP >= 140 MM HG: CPT | Performed by: NEUROLOGICAL SURGERY

## 2023-11-30 PROCEDURE — 99214 OFFICE O/P EST MOD 30 MIN: CPT | Performed by: NEUROLOGICAL SURGERY

## 2023-11-30 PROCEDURE — 1036F TOBACCO NON-USER: CPT | Performed by: NEUROLOGICAL SURGERY

## 2023-11-30 PROCEDURE — 1159F MED LIST DOCD IN RCRD: CPT | Performed by: NEUROLOGICAL SURGERY

## 2023-11-30 PROCEDURE — 3079F DIAST BP 80-89 MM HG: CPT | Performed by: NEUROLOGICAL SURGERY

## 2023-11-30 PROCEDURE — 1125F AMNT PAIN NOTED PAIN PRSNT: CPT | Performed by: NEUROLOGICAL SURGERY

## 2023-11-30 PROCEDURE — 1160F RVW MEDS BY RX/DR IN RCRD: CPT | Performed by: NEUROLOGICAL SURGERY

## 2023-11-30 RX ORDER — MELOXICAM 15 MG/1
TABLET ORAL
COMMUNITY
Start: 2023-04-04 | End: 2024-04-03

## 2023-11-30 ASSESSMENT — PATIENT HEALTH QUESTIONNAIRE - PHQ9
SUM OF ALL RESPONSES TO PHQ9 QUESTIONS 1 AND 2: 1
2. FEELING DOWN, DEPRESSED OR HOPELESS: NOT AT ALL
1. LITTLE INTEREST OR PLEASURE IN DOING THINGS: SEVERAL DAYS

## 2023-11-30 ASSESSMENT — ENCOUNTER SYMPTOMS: OCCASIONAL FEELINGS OF UNSTEADINESS: 0

## 2023-11-30 ASSESSMENT — PAIN SCALES - GENERAL: PAINLEVEL: 6

## 2023-11-30 NOTE — PROGRESS NOTES
[facility] [place]  Department of Neurological Surgery  Established Patient Visit    History of Present Illness  Yoan Sharp is a 68 y.o. year old male who presents to the spine clinic in follow up with a history of a new recent fall approximately a month ago.  He landed on his face.  He was taken to [facility] where they scanned his brain, his face, his C-spine, T-spine, his L-spine.  He states that the fall was a 1 off.  He has been doing significantly better lately.  His pain is much less.  His right arm and shoulder is working much better.  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  He does have biceps and the frozen shoulder weakness on the left side.  The idea of doing a 4 level anterior cervical disc excision with interbody fusion and plate fixation is something we could consider.  Because he has such a strong triceps and hand function I think I would just recommend the top 3 levels being done instead.  And if push came to shove I would only recommend the C4-5 in the C5-6 levels.  The patient would like to see if he continues to make progress.  I told him we will be shutting the door on the surgery.  So he is going to come back and see us in 3 to 4 months.  Sooner if he would like to proceed with surgery sooner.  We will track down his studies from [facility] so that we have a complete set of his images.    Patient's BMI is Body mass index is 26.36 kg/m².    14/14 systems reviewed and negative other than what is listed in the history of present illness    Patient Active Problem List   Diagnosis    Alcohol abuse    Hyperlipemia, mixed    Benign essential hypertension    Neuropathy    Falls frequently    Facial injury    Closed fracture of multiple ribs of right side with routine healing    Rib pain on right side    Left cervical radiculopathy     No past medical history on file.  Past Surgical History:   Procedure Laterality Date    CERVICAL FUSION   12/30/2022    HARDWARE REMOVAL  03/06/2023    WOUND DEBRIDEMENT       Social History     Tobacco Use    Smoking status: Former     Types: Cigarettes    Smokeless tobacco: Never   Substance Use Topics    Alcohol use: Never     family history includes Heart disease in his father.    Current Outpatient Medications:     meloxicam (Mobic) 15 mg tablet, Take by mouth once daily., Disp: , Rfl:     acetaminophen (Tylenol) 325 mg tablet, Take by mouth every 6 hours if needed for mild pain (1 - 3)., Disp: , Rfl:     amLODIPine (Norvasc) 5 mg tablet, Take 1 tablet (5 mg) by mouth once daily., Disp: 90 tablet, Rfl: 3    atorvastatin (Lipitor) 20 mg tablet, Take 1 tablet (20 mg) by mouth once daily., Disp: 90 tablet, Rfl: 3    folic acid (Folvite) 1 mg tablet, Take 1 tablet (1 mg) by mouth once daily., Disp: 90 tablet, Rfl: 3    gabapentin (Neurontin) 300 mg capsule, Take 1 capsule (300 mg) by mouth once daily in the morning AND 2 capsules (600 mg) once daily in the evening., Disp: 270 capsule, Rfl: 3    losartan (Cozaar) 100 mg tablet, Take 1 tablet (100 mg) by mouth once daily., Disp: 90 tablet, Rfl: 3    sennosides (Senokot) 8.6 mg tablet, Take 1 tablet (8.6 mg) by mouth once daily. 1 Tablet by mouth at bedtime for 7 days, Disp: , Rfl:   Allergies   Allergen Reactions    Iodine Unknown    Lisinopril Swelling and Unknown     Swelling of the face    Shellfish Containing Products Unknown and Other       Physical Examination:    General: NAD, AOx 3,  no aphasia or dysarthria, normal fund of knowledge  Cranial Nerves II-XII: VFF, PERRL, EOMI, Face Symm, Facial SILT, Palate/Tongue midline and symmetric, a large ecchymotic areas under each eye.  Motor: 5/5 Throughout all extremities except on the left side where his deltoid Be checked because of the frozen shoulder and his bicep is only about 3 out of 5,  No drift, no dysmetria on finger to nose  Sensation: SILT and PP throughout all extremities  DTRS: 3+ Throughout, positive  Hoffmans no clonus  Gait is slow and deliberate with his walking stick    Results:  I personally reviewed and interpreted the imaging results which included the reports from [facility] show that there is no fracture in the cervical spine    Assessment and Plan:      Yoan Sharp is a 68 y.o. year old male who presents to the spine clinic in follow up with slow steady improvement and a desire to hold off on considering surgical intervention in the cervical spine.  We did however take the time to talk about an anterior approach and a multilevel decompression and fusion with plate fixation.  I went over the operation with him in detail. We discussed risks,(these include but are not limited to - bleeding, infection, paralysis, muscle weakness, CSF leak, bowel or bladder dysfunction, incomplete resolution of pain or numbness, DVT/PE, heart attack, stroke, and other unforeseen medical and anesthesia complications) benefits, and outcome possibilities as well as the alternatives to this form of therapy. He understands and would like to have his shoulder addressed and then see if he continues to make improvements over the winter.  He will see me in the spring.  Sooner if he wants to proceed with surgery.    I have reviewed all prior documentation and reviewed the electronic medical record since admission. I have personally have reviewed all advanced imaging not just the reports and used my interpretation as documented as the relevant findings. I have reviewed the risks and benefits of all treatment recommendations listed in this note with the patient and family. I spent a total of 30 minutes in service to this patient's care during this date of service.      The above clinical summary has been dictated with voice recognition software. It has not been proofread for grammatical errors, typographical mistakes, or other semantic inconsistencies.    Thank you for visiting our office today. It was our pleasure to take part in  your healthcare.     Do not hesitate to call with any questions regarding your plan of care after leaving. My office can be reached at [phone] M-F 8am-4pm.     To clinicians, thank you very much for this kind referral. It is a privilege to partner with you in the care of your patients. My office would be delighted to assist you with any further consultations or with questions regarding the plan of care outlined. Do not hesitate to call the office or contact me directly.     Sincerely,    Yoan Ellis MD, FAANS, FACS  Board Certified Neurological Surgeon  , Department of Neurological Surgery  [employer] [facility] [employer]    [facility]  [address]  [facility] [address]  [place] [address]    [facility]  [address]  [address]  [place] [address]    Phone: [phone]  Fax: [phone]

## 2023-12-07 ENCOUNTER — PATIENT OUTREACH (OUTPATIENT)
Dept: CARE COORDINATION | Facility: CLINIC | Age: 68
End: 2023-12-07
Payer: MEDICARE

## 2023-12-07 NOTE — PROGRESS NOTES
Unable to reach patient for one month post discharge follow up call.               Unable to leave a message. VM not set up.

## 2024-02-05 ENCOUNTER — PATIENT OUTREACH (OUTPATIENT)
Dept: CARE COORDINATION | Facility: CLINIC | Age: 69
End: 2024-02-05
Payer: MEDICARE

## 2024-02-05 NOTE — PROGRESS NOTES
Unable to reach patient for the 90 days post discharge follow up call.               LVM with call back number for patient to call if needed     Patient has met target of no readmission for 90 days post hospital discharge and is graduated from Transitional Care Management program at this time.

## 2024-03-04 ENCOUNTER — LAB (OUTPATIENT)
Dept: LAB | Facility: LAB | Age: 69
End: 2024-03-04
Payer: MEDICARE

## 2024-03-04 ENCOUNTER — OFFICE VISIT (OUTPATIENT)
Dept: PRIMARY CARE | Facility: CLINIC | Age: 69
End: 2024-03-04
Payer: MEDICARE

## 2024-03-04 VITALS
WEIGHT: 203 LBS | BODY MASS INDEX: 28.42 KG/M2 | OXYGEN SATURATION: 95 % | HEART RATE: 101 BPM | HEIGHT: 71 IN | DIASTOLIC BLOOD PRESSURE: 76 MMHG | TEMPERATURE: 98.2 F | SYSTOLIC BLOOD PRESSURE: 123 MMHG

## 2024-03-04 DIAGNOSIS — G62.9 NEUROPATHY: ICD-10-CM

## 2024-03-04 DIAGNOSIS — Z00.00 MEDICARE ANNUAL WELLNESS VISIT, SUBSEQUENT: Primary | ICD-10-CM

## 2024-03-04 DIAGNOSIS — N40.0 BENIGN PROSTATIC HYPERPLASIA WITHOUT LOWER URINARY TRACT SYMPTOMS: ICD-10-CM

## 2024-03-04 DIAGNOSIS — H61.20 IMPACTED CERUMEN, UNSPECIFIED LATERALITY: ICD-10-CM

## 2024-03-04 DIAGNOSIS — E78.2 HYPERLIPEMIA, MIXED: ICD-10-CM

## 2024-03-04 DIAGNOSIS — Z23 NEED FOR PNEUMOCOCCAL VACCINE: ICD-10-CM

## 2024-03-04 DIAGNOSIS — E22.2 SIADH (SYNDROME OF INAPPROPRIATE ADH PRODUCTION) (MULTI): ICD-10-CM

## 2024-03-04 DIAGNOSIS — I10 HYPERTENSION, UNSPECIFIED TYPE: ICD-10-CM

## 2024-03-04 DIAGNOSIS — I71.40 ABDOMINAL AORTIC ANEURYSM (AAA) WITHOUT RUPTURE, UNSPECIFIED PART (CMS-HCC): ICD-10-CM

## 2024-03-04 DIAGNOSIS — R73.9 HYPERGLYCEMIA: ICD-10-CM

## 2024-03-04 DIAGNOSIS — R09.89 DECREASED CIRCULATION IN FINGERS OR TOES DUE TO SMOKING: ICD-10-CM

## 2024-03-04 DIAGNOSIS — I10 BENIGN ESSENTIAL HYPERTENSION: ICD-10-CM

## 2024-03-04 DIAGNOSIS — Z00.00 MEDICARE ANNUAL WELLNESS VISIT, SUBSEQUENT: ICD-10-CM

## 2024-03-04 DIAGNOSIS — Z12.11 COLON CANCER SCREENING: ICD-10-CM

## 2024-03-04 DIAGNOSIS — F17.200 DECREASED CIRCULATION IN FINGERS OR TOES DUE TO SMOKING: ICD-10-CM

## 2024-03-04 DIAGNOSIS — Z11.59 NEED FOR HEPATITIS C SCREENING TEST: ICD-10-CM

## 2024-03-04 PROBLEM — S22.41XD CLOSED FRACTURE OF MULTIPLE RIBS OF RIGHT SIDE WITH ROUTINE HEALING: Status: RESOLVED | Noted: 2023-11-13 | Resolved: 2024-03-04

## 2024-03-04 PROBLEM — R07.81 RIB PAIN ON RIGHT SIDE: Status: RESOLVED | Noted: 2023-11-13 | Resolved: 2024-03-04

## 2024-03-04 PROBLEM — M54.12 LEFT CERVICAL RADICULOPATHY: Status: RESOLVED | Noted: 2023-11-30 | Resolved: 2024-03-04

## 2024-03-04 PROBLEM — S09.93XA FACIAL INJURY: Status: RESOLVED | Noted: 2023-11-13 | Resolved: 2024-03-04

## 2024-03-04 PROBLEM — R29.6 FALLS FREQUENTLY: Status: RESOLVED | Noted: 2023-11-13 | Resolved: 2024-03-04

## 2024-03-04 PROBLEM — F10.10 ALCOHOL ABUSE: Status: RESOLVED | Noted: 2023-04-04 | Resolved: 2024-03-04

## 2024-03-04 LAB
ALBUMIN SERPL BCP-MCNC: 4.8 G/DL (ref 3.4–5)
ALP SERPL-CCNC: 98 U/L (ref 33–136)
ALT SERPL W P-5'-P-CCNC: 52 U/L (ref 10–52)
ANION GAP SERPL CALC-SCNC: 14 MMOL/L (ref 10–20)
AST SERPL W P-5'-P-CCNC: 37 U/L (ref 9–39)
BASOPHILS # BLD AUTO: 0.06 X10*3/UL (ref 0–0.1)
BASOPHILS NFR BLD AUTO: 0.7 %
BILIRUB SERPL-MCNC: 0.5 MG/DL (ref 0–1.2)
BUN SERPL-MCNC: 16 MG/DL (ref 6–23)
CALCIUM SERPL-MCNC: 10.1 MG/DL (ref 8.6–10.6)
CHLORIDE SERPL-SCNC: 97 MMOL/L (ref 98–107)
CHOLEST SERPL-MCNC: 180 MG/DL (ref 0–199)
CHOLESTEROL/HDL RATIO: 1.7
CO2 SERPL-SCNC: 26 MMOL/L (ref 21–32)
CREAT SERPL-MCNC: 1.17 MG/DL (ref 0.5–1.3)
EGFRCR SERPLBLD CKD-EPI 2021: 68 ML/MIN/1.73M*2
EOSINOPHIL # BLD AUTO: 0.3 X10*3/UL (ref 0–0.7)
EOSINOPHIL NFR BLD AUTO: 3.5 %
ERYTHROCYTE [DISTWIDTH] IN BLOOD BY AUTOMATED COUNT: 12.5 % (ref 11.5–14.5)
EST. AVERAGE GLUCOSE BLD GHB EST-MCNC: 97 MG/DL
GLUCOSE SERPL-MCNC: 90 MG/DL (ref 74–99)
HBA1C MFR BLD: 5 %
HCT VFR BLD AUTO: 40.4 % (ref 41–52)
HDLC SERPL-MCNC: 103.3 MG/DL
HGB BLD-MCNC: 13.1 G/DL (ref 13.5–17.5)
IMM GRANULOCYTES # BLD AUTO: 0.02 X10*3/UL (ref 0–0.7)
IMM GRANULOCYTES NFR BLD AUTO: 0.2 % (ref 0–0.9)
LDLC SERPL CALC-MCNC: 65 MG/DL
LYMPHOCYTES # BLD AUTO: 1.53 X10*3/UL (ref 1.2–4.8)
LYMPHOCYTES NFR BLD AUTO: 18 %
MCH RBC QN AUTO: 31.2 PG (ref 26–34)
MCHC RBC AUTO-ENTMCNC: 32.4 G/DL (ref 32–36)
MCV RBC AUTO: 96 FL (ref 80–100)
MONOCYTES # BLD AUTO: 0.82 X10*3/UL (ref 0.1–1)
MONOCYTES NFR BLD AUTO: 9.7 %
NEUTROPHILS # BLD AUTO: 5.76 X10*3/UL (ref 1.2–7.7)
NEUTROPHILS NFR BLD AUTO: 67.9 %
NON HDL CHOLESTEROL: 77 MG/DL (ref 0–149)
NRBC BLD-RTO: 0 /100 WBCS (ref 0–0)
PLATELET # BLD AUTO: 246 X10*3/UL (ref 150–450)
POTASSIUM SERPL-SCNC: 4.7 MMOL/L (ref 3.5–5.3)
PROT SERPL-MCNC: 7.3 G/DL (ref 6.4–8.2)
PSA SERPL-MCNC: 0.57 NG/ML
RBC # BLD AUTO: 4.2 X10*6/UL (ref 4.5–5.9)
SODIUM SERPL-SCNC: 132 MMOL/L (ref 136–145)
TRIGL SERPL-MCNC: 60 MG/DL (ref 0–149)
TSH SERPL-ACNC: 1.14 MIU/L (ref 0.44–3.98)
URATE SERPL-MCNC: 3 MG/DL (ref 4–7.5)
VLDL: 12 MG/DL (ref 0–40)
WBC # BLD AUTO: 8.5 X10*3/UL (ref 4.4–11.3)

## 2024-03-04 PROCEDURE — G0009 ADMIN PNEUMOCOCCAL VACCINE: HCPCS | Performed by: INTERNAL MEDICINE

## 2024-03-04 PROCEDURE — 3074F SYST BP LT 130 MM HG: CPT | Performed by: INTERNAL MEDICINE

## 2024-03-04 PROCEDURE — 1160F RVW MEDS BY RX/DR IN RCRD: CPT | Performed by: INTERNAL MEDICINE

## 2024-03-04 PROCEDURE — 83036 HEMOGLOBIN GLYCOSYLATED A1C: CPT

## 2024-03-04 PROCEDURE — 80053 COMPREHEN METABOLIC PANEL: CPT

## 2024-03-04 PROCEDURE — 80061 LIPID PANEL: CPT

## 2024-03-04 PROCEDURE — 1036F TOBACCO NON-USER: CPT | Performed by: INTERNAL MEDICINE

## 2024-03-04 PROCEDURE — 1125F AMNT PAIN NOTED PAIN PRSNT: CPT | Performed by: INTERNAL MEDICINE

## 2024-03-04 PROCEDURE — 84443 ASSAY THYROID STIM HORMONE: CPT

## 2024-03-04 PROCEDURE — G0296 VISIT TO DETERM LDCT ELIG: HCPCS | Performed by: INTERNAL MEDICINE

## 2024-03-04 PROCEDURE — 3078F DIAST BP <80 MM HG: CPT | Performed by: INTERNAL MEDICINE

## 2024-03-04 PROCEDURE — G0439 PPPS, SUBSEQ VISIT: HCPCS | Performed by: INTERNAL MEDICINE

## 2024-03-04 PROCEDURE — 99497 ADVNCD CARE PLAN 30 MIN: CPT | Performed by: INTERNAL MEDICINE

## 2024-03-04 PROCEDURE — 84550 ASSAY OF BLOOD/URIC ACID: CPT

## 2024-03-04 PROCEDURE — 36415 COLL VENOUS BLD VENIPUNCTURE: CPT

## 2024-03-04 PROCEDURE — 84153 ASSAY OF PSA TOTAL: CPT

## 2024-03-04 PROCEDURE — 85025 COMPLETE CBC W/AUTO DIFF WBC: CPT

## 2024-03-04 PROCEDURE — 90677 PCV20 VACCINE IM: CPT | Performed by: INTERNAL MEDICINE

## 2024-03-04 PROCEDURE — 1159F MED LIST DOCD IN RCRD: CPT | Performed by: INTERNAL MEDICINE

## 2024-03-04 PROCEDURE — 99213 OFFICE O/P EST LOW 20 MIN: CPT | Performed by: INTERNAL MEDICINE

## 2024-03-04 RX ORDER — NEOMYCIN SULFATE, POLYMYXIN B SULFATE, HYDROCORTISONE 3.5; 10000; 1 MG/ML; [USP'U]/ML; MG/ML
SOLUTION/ DROPS AURICULAR (OTIC)
Qty: 5 ML | Refills: 0 | Status: SHIPPED | OUTPATIENT
Start: 2024-03-04

## 2024-03-04 RX ORDER — FINASTERIDE 5 MG/1
5 TABLET, FILM COATED ORAL DAILY
Qty: 90 TABLET | Refills: 3 | Status: SHIPPED | OUTPATIENT
Start: 2024-03-04 | End: 2025-03-04

## 2024-03-04 ASSESSMENT — ENCOUNTER SYMPTOMS
OCCASIONAL FEELINGS OF UNSTEADINESS: 1
LOSS OF SENSATION IN FEET: 0
DEPRESSION: 0

## 2024-03-04 ASSESSMENT — PATIENT HEALTH QUESTIONNAIRE - PHQ9
10. IF YOU CHECKED OFF ANY PROBLEMS, HOW DIFFICULT HAVE THESE PROBLEMS MADE IT FOR YOU TO DO YOUR WORK, TAKE CARE OF THINGS AT HOME, OR GET ALONG WITH OTHER PEOPLE: SOMEWHAT DIFFICULT
1. LITTLE INTEREST OR PLEASURE IN DOING THINGS: SEVERAL DAYS
SUM OF ALL RESPONSES TO PHQ9 QUESTIONS 1 AND 2: 2
2. FEELING DOWN, DEPRESSED OR HOPELESS: SEVERAL DAYS

## 2024-03-04 NOTE — PROGRESS NOTES
Assessment and Plan:    Advised to get hepatitis A COVID-19 vaccine screening for hepatitis C abdominal aortic aneurysm CAT scan of the lung CAT scan of the calcium score for heart  Problem List Items Addressed This Visit       Hyperlipemia, mixed    Relevant Orders    Albumin , Urine Random    CBC and Auto Differential    Comprehensive Metabolic Panel    Hemoglobin A1C    Lipid Panel    Prostate Specific Antigen, Screen    TSH with reflex to Free T4 if abnormal    Uric Acid    Microscopic Only, Urine    Colonoscopy Screening; Average Risk Patient    Benign essential hypertension     Patients BP readings reviewed and addressed, as we age our arteries turn stiffer and less elastic. Restricting salt consumption and staying physically fit with regular exercise regimen is the only way to keep our vasculature less tonic. Studies have shown that keeping ideal body wt, exercise routine about 140 to 150 minutes a week, eating variety of plant based diet and drinking plentiful water are quite helpful. Monitor BP twice or once a week at home and bring log to be reviewed by me. Uncontrolled BP has long term consequences including heart failure, myocardial infarction, accelerated atherosclerosis and kidney dysfunction. Therapy reviewed and explained.           Relevant Orders    Albumin , Urine Random    CBC and Auto Differential    Comprehensive Metabolic Panel    Hemoglobin A1C    Lipid Panel    Prostate Specific Antigen, Screen    TSH with reflex to Free T4 if abnormal    Uric Acid    Microscopic Only, Urine    Colonoscopy Screening; Average Risk Patient    Medicare annual wellness visit, subsequent - Primary    Relevant Orders    Albumin , Urine Random    CBC and Auto Differential    Comprehensive Metabolic Panel    Hemoglobin A1C    Lipid Panel    Prostate Specific Antigen, Screen    TSH with reflex to Free T4 if abnormal    Uric Acid    Microscopic Only, Urine    Colonoscopy Screening; Average Risk Patient    Neuropathy     Relevant Orders    Albumin , Urine Random    CBC and Auto Differential    Comprehensive Metabolic Panel    Hemoglobin A1C    Lipid Panel    Prostate Specific Antigen, Screen    TSH with reflex to Free T4 if abnormal    Uric Acid    Microscopic Only, Urine    Colonoscopy Screening; Average Risk Patient    Benign prostatic hyperplasia without lower urinary tract symptoms    Relevant Orders    Prostate Specific Antigen, Screen    Impacted cerumen    SIADH (syndrome of inappropriate ADH production) (CMS/[facility])    Relevant Orders    Albumin , Urine Random    CBC and Auto Differential    Comprehensive Metabolic Panel    Hemoglobin A1C    Lipid Panel    Prostate Specific Antigen, Screen    TSH with reflex to Free T4 if abnormal    Uric Acid    Microscopic Only, Urine    Colonoscopy Screening; Average Risk Patient     Other Visit Diagnoses       Need for pneumococcal vaccine        Relevant Orders    Pneumococcal conjugate vaccine, 20-valent (PREVNAR 20) (Completed)    Albumin , Urine Random    CBC and Auto Differential    Comprehensive Metabolic Panel    Hemoglobin A1C    Lipid Panel    Prostate Specific Antigen, Screen    TSH with reflex to Free T4 if abnormal    Uric Acid    Microscopic Only, Urine    Colonoscopy Screening; Average Risk Patient    Abdominal aortic aneurysm (AAA) without rupture, unspecified part (CMS/[facility])        Relevant Orders    Albumin , Urine Random    CBC and Auto Differential    Comprehensive Metabolic Panel    Hemoglobin A1C    Lipid Panel    Prostate Specific Antigen, Screen    TSH with reflex to Free T4 if abnormal    Uric Acid    Microscopic Only, Urine    Colonoscopy Screening; Average Risk Patient    Vascular US Abdominal Aorta Aneurysm AAA Screening    Hypertension, unspecified type        Relevant Orders    Albumin , Urine Random    CBC and Auto Differential    Comprehensive Metabolic Panel    Hemoglobin A1C    Lipid Panel    Prostate Specific Antigen, Screen    TSH with reflex to Free T4  if abnormal    Uric Acid    Microscopic Only, Urine    CT cardiac scoring wo IV contrast    Colonoscopy Screening; Average Risk Patient    Colon cancer screening        Relevant Orders    Albumin , Urine Random    CBC and Auto Differential    Comprehensive Metabolic Panel    Hemoglobin A1C    Lipid Panel    Prostate Specific Antigen, Screen    TSH with reflex to Free T4 if abnormal    Uric Acid    Microscopic Only, Urine    Colonoscopy Screening; Average Risk Patient    Hyperglycemia        Relevant Orders    Hemoglobin A1C              Chief Complaint:   Annual Medicare Wellness Office Exam/Comprehensive Problem Focused Follow Up and Physical Exam      HPI: 68-year-old patient  with 4 children    5 brother 2 sisters Sister have a stroke Brother of heart attack past    Mother  95    Father of massive heart attack age of 70    Personal history of smoking drinking    Neck pain back pain    Seen by Dr. Caleb Gutierrez for surgery    Complaining urgency frequency nocturia    Ear pain    Negative for headache chest pain    Negative for jaundice or hematuria    Negative for fall    Negative for dementia depression or suicide    Onset of the pain in the ear gradually duration 3 months progress slowly aggravated by change in weather wax and infection    Urgency frequency nocturia onset gradually duration 6 months progressed slowly aggravated by BPH and lumbar radiculopathy        Patient Active Problem List:  Patient Active Problem List   Diagnosis    Hyperlipemia, mixed    Benign essential hypertension    Medicare annual wellness visit, subsequent    Neuropathy    Benign prostatic hyperplasia without lower urinary tract symptoms    Impacted cerumen    SIADH (syndrome of inappropriate ADH production) (CMS/[facility])          Comprehensive Medical/Surgical/Social/Family History:  Family History   Problem Relation Name Age of Onset    Heart disease Father         History reviewed. No pertinent past medical  history.    Past Surgical History:   Procedure Laterality Date    CERVICAL FUSION  12/30/2022    HARDWARE REMOVAL  03/06/2023    WOUND DEBRIDEMENT         Social History     Socioeconomic History    Marital status:      Spouse name: None    Number of children: None    Years of education: None    Highest education level: None   Occupational History    None   Tobacco Use    Smoking status: Former     Types: Cigarettes    Smokeless tobacco: Never   Substance and Sexual Activity    Alcohol use: Not Currently     Alcohol/week: 0.0 - 3.0 standard drinks of alcohol    Drug use: Never    Sexual activity: None   Other Topics Concern    None   Social History Narrative    None     Social Determinants of Health     Financial Resource Strain: Not on file   Food Insecurity: Not on file   Transportation Needs: Not on file   Physical Activity: Not on file   Stress: Not on file   Social Connections: Not on file   Intimate Partner Violence: Not on file   Housing Stability: Not on file       Tobacco/Alcohol/Opioid use, as well as Illicit Drug Use was screened for/reviewed and documented in Social Documentation section of the chart and medication list as appropriate    Allergies and Medications  Allergies   Allergen Reactions    Iodine Unknown    Lisinopril Swelling and Unknown     Swelling of the face    Shellfish Containing Products Unknown and Other     Current Outpatient Medications   Medication Sig Dispense Refill    acetaminophen (Tylenol) 325 mg tablet Take by mouth every 6 hours if needed for mild pain (1 - 3).      amLODIPine (Norvasc) 5 mg tablet Take 1 tablet (5 mg) by mouth once daily. 90 tablet 3    atorvastatin (Lipitor) 20 mg tablet Take 1 tablet (20 mg) by mouth once daily. 90 tablet 3    folic acid (Folvite) 1 mg tablet Take 1 tablet (1 mg) by mouth once daily. 90 tablet 3    gabapentin (Neurontin) 300 mg capsule Take 1 capsule (300 mg) by mouth once daily in the morning AND 2 capsules (600 mg) once daily in the  evening. 270 capsule 3    losartan (Cozaar) 100 mg tablet Take 1 tablet (100 mg) by mouth once daily. 90 tablet 3    meloxicam (Mobic) 15 mg tablet Take by mouth once daily.      sennosides (Senokot) 8.6 mg tablet Take 1 tablet (8.6 mg) by mouth once daily. 1 Tablet by mouth at bedtime for 7 days       No current facility-administered medications for this visit.       Medications and Supplements  prescribed by me and other practitioners or clinical pharmacist (such as prescriptions, OTC's, herbal therapies and supplements) were reviewed and documented in the medical record.     Advance directives  Advanced Care Planning (including a Living Will, Healthcare POA, as well as specific end of life choices and/or directives), was discussed for approximately 16 minutes with the patient and/or surrogate, voluntarily, and documented in the Problem List of the medical record.     Cardiac Risk Assessment  Cardiovascular risk was discussed and, if needed, lifestyle modifications recommended, including nutritional choices, exercise, and elimination of habits contributing to risk. We agreed on a plan to reduce the current cardiovascular risk based on above discussion as needed.  Aspirin use/disuse was discussed and documented in the Problem List of the medical record after reviewing the updated guidelines below:    Consider low dose Aspirin ( mg) use if the benefit for cardiovascular disease prevention outweighs risk for bleeding complications.   In general, low dose ASA should be considered:  In patients WITHOUT prior MI/stroke/PAD (primary prevention):   a. Age <60: Use if 10-year cardiovascular disease risk >20%, with discussion of risks and benefits with patient  b. Age 60-<70: Use if 10-year cardiovascular disease risk >20% and low bleeding (e.g., gastrointenstinal) risk, with discussion of risks and benefits with patient  c. Age >=70: Do not use    In patients WITH prior MI/stroke/PAD (secondary prevention):  "  Generally use unless extremely high bleeding (e.g., gastrointenstinal) risk, with discussion of risks and benefits with patient    ROS otherwise negative aside from what was mentioned above in HPI.    Visit Vitals  /76 (BP Location: Left arm, Patient Position: Sitting, BP Cuff Size: Adult)   Pulse 101   Temp 36.8 °C (98.2 °F)   Ht 1.803 m (5' 11\")   Wt 92.1 kg (203 lb)   SpO2 95%   BMI 28.31 kg/m²   Smoking Status Former   BSA 2.15 m²       Physical Exam  BMI 28  Physical Exam:    Appearance: Alert, oriented , cooperative,  in no acute distress. Well nourished & well hydrated.    Skin: Intact,  dry skin, no lesions, rash, petechiae or purpura.     Eyes: Eyeglasses  ENT: Fax.     Neck: Supple, without meningismus. Thyroid not palpable. Trachea at midline. No lymphadenopathy.    Pulmonary: Crackles rhonchi.    Cardiac: Heart murmur abdomen: Soft, nontender, active bowel sounds.  No palpable organomegaly.  No rebound or guarding.  No CVA tenderness.    Genitourinary: FOBT negative  Musculoskeletal: Crackles rales rhonchi.    Neurological: Cervical lumbar radiculopathy  Psychiatric: Appropriate mood and affect.       During the course of the visit the patient was educated and counseled about age appropriate screening and preventive services. Completed preventive screenings were documented in the chart and orders were placed for outstanding screenings/procedures as documented in the Assessment and Plan.    Patient Instructions (the written plan) was given to the patient at check out.    Charting was completed using voice recognition technology and may include unintended errors.  "

## 2024-03-05 ENCOUNTER — TELEPHONE (OUTPATIENT)
Dept: PRIMARY CARE | Facility: CLINIC | Age: 69
End: 2024-03-05
Payer: MEDICARE

## 2024-03-05 NOTE — TELEPHONE ENCOUNTER
----- Message from Marcio Arias MD sent at 3/5/2024 10:59 AM EST -----  Chronic anemia chronic low sodium    Slow Fe 1 a day cut down water intake if the sodium problems continue advised to see  for SIADH

## 2024-03-06 NOTE — CONSULTS
Service:   Service: Infectious Disease     Consult:  Consult requested by (Attending Name): Lisa Mesa   Reason: cervical wound dehiscence     History of Present Illness:   HPI:    DESTINY SHARP is a 67 year old Male with PMHx of HTN and EtOH abuse, and cervical myelopathy secondary to a fall in December.     -After fall happened, he underwent 12/29/2022 C3-T1 PCDF with Dr. Bryant with remnant C5 palsy (unable to raise left hand above elbow level).     -After that he could feel lumps in his posterior neck, was diagnosed with hardware loosening and was followed up with Dr. Ulysses Mesa with scans and watchful waiting.     -When Dr. Mesa was travelling, Mr. Sharp had severe intolerable pain and had to undergo surgery with Dr. Ellis on 3/6 for hardware removal at [facility]    -Bulb drain was in place for first couple of days post OR but it was leaking and the bulb came out (details not clear). Incision was opening and leaking so patient was transferred to  on 3/11 for plastic surgery consult and OR for flap.     -3/12 patient underwent wound washout and Vac placement only    -3/14 expected OR for flap and closure of dehiscence     Review Family/Social History and ROS:   Social History:    Alcohol Use: history of abuse     Constitutional: NEGATIVE: Fever, Chills, Anorexia,  Weight Loss, Malaise     Eyes: NEGATIVE: Blurry Vision, Drainage, Diploplia,  Redness, Vision Loss/ Change     ENMT: NEGATIVE: Nasal Discharge, Nasal Congestion,  Ear Pain, Mouth Pain, Throat Pain     Respiratory: NEGATIVE: Dry Cough, Productive Cough,  Hemoptysis, Wheezing, Shortness of Breath     Cardiac: NEGATIVE: Chest Pain, Dyspnea on Exertion,  Orthopnea, Palpitations, Syncope     Gastrointestinal: NEGATIVE: Nausea, Vomiting, Diarrhea,  Constipation, Abdominal Pain     Genitourinary: NEGATIVE: Discharge, Dysuria, Flank  Pain, Frequency, Hematuria     Musculoskeletal: POSITIVE: Decreased ROM, Pain, Swelling, Stiffness, Weakness      Neurological: NEGATIVE: Dizziness, Confusion, Headache,  Seizures, Syncope     Psychiatric: NEGATIVE: Mood Changes, Anxiety, Hallucinations,  Sleep Changes, Suicidal Ideas     Skin: NEGATIVE: Mass, Pain, Pruritus, Rash, Ulcer     Endocrine: NEGATIVE: Heat Intolerance, Cold Intolerance,  Sweat, Polyuria, Thirst     Hematologic/Lymph: NEGATIVE: Anemia, Bruising, Easy  Bleeding, Night Sweats, Petechiae     Allergic/Immunologic: NEGATIVE: Anaphylaxis, Itchy/  Teary Eyes, Itching, Sneezing, Swelling              Allergies:  ·  lisinopril : Angioedema  ·  Shell  Fish: Anaphylaxis    Objective:     Objective Information:        T   P  R  BP   MAP  SpO2   Value  36.8  91  18  136/74      96%  Date/Time 3/13 15:48 3/13 15:48 3/13 15:48 3/13 15:48    3/13 15:48  Range  (36.2C - 36.9C )  (84 - 98 )  (16 - 18 )  (126 - 154 )/ (68 - 86 )    (93% - 96% )  Highest temp of 36.9 C was recorded at 3/12 19:29    Physical Exam by System:    Constitutional: Well developed, awake/alert/oriented  x3, no distress, alert and cooperative   Eyes: PERRL, EOMI, clear sclera   Head/Neck: Neck wound looks mildly edematous and  with Vac in place   Respiratory/Thorax: Patent airways, CTAB, normal  breath sounds with good chest expansion, thorax symmetric   Cardiovascular: Regular, rate and rhythm, no murmurs,  2+ equal pulses of the extremities, normal S 1and S 2   Gastrointestinal: Nondistended, soft, non-tender,  no rebound tenderness or guarding, no masses palpable, no organomegaly, +BS, no bruits   Genitourinary: No Discharge, vesicles or other abnormalities   Musculoskeletal: weak left upper arm C5 palsy   Extremities: normal extremities, no cyanosis edema,  contusions or wounds, no clubbing   Neurological: alert and oriented x3, intact senses,  motor, response and reflexes, normal strength of right    Left C5 palsy and inability to raies arm.  Can  left hand.  Weak biceps.   Psychological: normal conversation  expected anxiety   Skin:  Warm and dry, no lesions, no rashes     Medications:    Medications:          Continuous Medications       --------------------------------    1. Lactated Ringers Infusion:  1000  mL  IntraVenous  <Continuous>         Scheduled Medications       --------------------------------    1. amLODIPine (NORVASC):  5  mg  Oral  Daily    2. Atorvastatin:  20  mg  Oral  Every Night    3. Cefepime IV Piggy Back:  2  gram(s)  IntraVenous Piggyback  Every 12 Hours    4. Cholecalciferol (Vitamin D3):  1000  International Unit(s)  Oral  Daily    5. Cyanocobalamin:  1000  microgram(s)  Oral  Daily    6. Docusate:  100  mg  Oral  2 Times a Day    7. Famotidine:  20  mg  Oral  2 Times a Day    8. Folic Acid:  1  mg  Oral  Daily    9. Gabapentin:  300  mg  Oral  3 Times a Day    10. Heparin SubCutaneous:  5000  unit(s)  SubCutaneous  Every 8 Hours    11. Sennosides:  2  tablet(s)  Oral  Daily    12. Thiamine:  100  mg  Oral  Daily    13. Vancomycin - RPh to Dose - IV Piggy Back:  1  each  As Specified  Variable    14. Vancomycin IV Piggy Back:  1250  mg  IntraVenous Piggyback  Every 12 Hours         PRN Medications       --------------------------------    1. Bisacodyl Rectal:  10  mg  Rectal  Daily    2. Cyclobenzaprine:  10  mg  Oral  3 Times a Day    3. Fleet Adult (Sodium Phosphate) Rectal:  1  enema  Rectal  Daily    4. oxyCODONE Immediate Release:  5  mg  Oral  Every 4 Hours    5. Polyethylene Glycol:  17  gram(s)  Oral  Daily        Recent Lab Results:    Results:      I have reviewed these laboratory results:    Culture, Miscellaneous, includes Gram Stain  12-Mar-2023 12:21:00      Result Value    Gram Stain  NO GRANULOCYTES OR ORGANISMS SEEN.    Culture, Miscellaneous, includes Gram Stain  NO GROWTH, CULTURE IN PROGRESS.      Culture, Fungus + Fungus Stain  12-Mar-2023 12:21:00      Result Value    Fungal Smear  FLUORESCENT FUNGAL STAIN: NEGATIVE    Culture, Fungus + Fungus Stain  CULTURE IS IN PROGRESS                          "      A REPORT WILL BE ISSUED EITHER WHEN POSITIVE OR AFTER  TWO WEEKS INCUBATION.        Radiology Results:    Results:        Impression:    1. Soft tissue dehiscence of the posterior neck from C5-T1 with  multiple scattered areas of hypoattenuation areas in the posterior  neck as well as multiple foci of air without definite evidence of a  fluid collection, although evaluation is limited secondary to lack of  IV contrast.  2. Status post laminectomies from C3-C6 levels.      CT C Spine without Contrast [Mar 12 2023  7:46AM]        Assessment:    DESTINY SHARP is a 67 year old Male with PMHx of HTN and EtOH abuse, and cervical myelopathy secondary to a fall in December.   -After fall happened, he underwent 12/29/2022 C3-T1 PCDF with Dr. Bryant with remnant C5 palsy (unable to raise left hand above elbow level).   -After that he could feel lumps in his posterior neck, was diagnosed with hardware loosening and was followed up with Dr. Ulysses Mesa with scans and watchful waiting.   -When Dr. Mesa was travelling, Mr. Sharp had severe intolerable pain and had to undergo surgery with Dr. Ellis on 3/6 for hardware removal.   -Bulb drain was in place for first couple of days post OR but it was leaking and the bulb came out. Incision was opening and leaking so patient was transferred to  on 3/11 for plastic surgery consult and OR for flap.   -3/12 patient underwent wound washout and Vac placement only  -3/14 expected OR for flap and closure of dehiscence    3/12/23 OR note excerpt:       \" . . . Exposed spinous process, wound dehisced down to level of the dura. Small amount of superficial purulence, no deep purulence. . . \"       Specimens(s) Collected: yes,  Suprafascial, Subfascial     Posterior Cervical wound dehiscence (no suspicion for meningitis)       Unclear what transpired postoperatively in December 2022 (we will try to locate some outpatient records)       Patient developed intractable pain and " went to the OR on 3/6/2022 for removal of hardware.  Patient reports that the screws could be readily removed.  This suggests that there was significant loosening but operative note did not suggest ongoing infection.   Need to discuss with neurosurgery.              Not clear what happened postoperatively to cause abrupt and rapid dehiscence.  if patient had issues with the TERRENCE drain and secondary subcutaneous buildup seroma or hematoma and secondary mechanical dehiscence.  Patient reports that TERRENCE drain fell  out on day of transfer.  Not certain if this happened as a result of dehiscence or was contributing to mechanical dehiscence.  ALSO, if this was infection and occurred that rapidly there should be some clinical suspicion of infection on examination or  in the OR.          3/12/2023 OR note was not suspicious of overt infection.         Patient reported and his wife reported wound dehiscence that was leaking clear fluid as per wife, not green or brown.        Recommendations:  - OR tomorrow, follow-up - PLEASE SEND additional cultures from deep tissue and/or bone if suspicious for infection    - Continue Vanc/Cefipime    - Appreciate Pharm.D. assistance in dosing for trough levels 15 to 20 mcg/ML    - Followup on 3/12/23 culture and antibiotic susceptibilities        For further questions, please call us (ID team B) on pager number [phone].    Cata Osborn MS4    and HERLINDA Wellington MD  ID Staff  Pager [phone]      Plan of Care Reviewed With:  Plan of Care Reviewed With: patient     Attestation:   Note Completion:  I am a:  Medical Student/Acting Intern   Medical Student Attestation I, or a resident under my supervision, was present with the medical student who participated in the documentation of this note.  I have personally seen and examined the patient and performed the medical decision-making components. I have reviewed the medical student documentation and/or resident documentation and verified the findings in the  note as written with additions or exceptions  as stated in the body of this note.    I personally evaluated the patient on 13-Mar-2023         Electronic Signatures:  Alex Wellington)  (Signed 13-Mar-2023 19:17)   Authored: Service, History of Present Illness, Objective,  Assessment/Recommendations, Note Completion   Co-Signer: Service, History of Present Illness, Review Family/Social History and ROS, Allergies, Objective, Assessment/Recommendations,  Note Completion  Cata Osborn (MED STUD)  (Signed 13-Mar-2023 16:18)   Authored: Service, History of Present Illness, Review  Family/Social History and ROS, Allergies, Objective, Assessment/Recommendations, Note Completion      Last Updated: 13-Mar-2023 19:17 by Alex Wellington)

## 2024-03-06 NOTE — CONSULTS
Service:   Service: Plastic Surgery     History of Present Illness:   HPI:    DESTINY INFANTE is a 67 year old Male with PMHx significant for HTN, etoh abuse, and cervical myelopathy s/p fall in Dec 2022. On 12/29/22, patient underwent C3-T1  PCDF with Dr. Bryant and unfortunately suffered from C5 palsy post-operatively and has had LUE motor weakness every since. On 3/6, patient had hardware removal and revision C3 laminectomy with Dr. Ellis. Patient states prior to him leaving the hospital  on Wednesday 3/8, his nurse noticed some drainage from his incision which was still closed at the time. Wife stated an abd pad was placed over the incision and they were discharged home. Drainage was weeping from the closed incision daily until the morning  of 3/10 when the incision completely dehisced. Patient notes tenderness around his wound. Admits he has had trouble walking since his fall back in December but has been gaining strength back through therapy. Denies fever, chest pain, SOB, abd pain, n/v/d.  Plastic surgery was consulted for assistance with wound closure.     PMHx: see HPI  Family Hx: noncontributory  Allergies: Lisinopril (angioedema); shellfish (anaphylaxis)  Medications: reviewed in EMR  Surgical Hx: bilateral knee replacements 12 years ago  Social Hx: +tobacco use, +etoh abuse hx  ROS: All 10 systems were reviewed and are unremarkable except for those mentioned in HPI             Allergies:  ·  lisinopril : Angioedema  ·  Shell  Fish: Anaphylaxis    Objective:   Physical Exam by System:    Constitutional: NAD   Eyes: EOMI, clear sclera   ENMT: MMM   Head/Neck: C-collar in place. Roughly 7 cm cervical  wound with exposed muscle and vertebrae. No gross bleeding, purulence, or foul odor. Some serosanguinous drainage noted on dressing. Surrounding skin without erythema, palpable fluctuance, warmth. Surrounding tissue minimally tender to palpation.   Respiratory/Thorax: Unlabored respiration on RA    Musculoskeletal: LUE: C5 palsy. 5/5  strength.  Motor unable to flex, extend, abduct, adduct.   Neurological: alert and oriented x3   Psychological: Appropriate mood and behavior   Skin: Warm and dry, no lesions, no rashes       Assessment:    Recommendations:  - OR with NSGY 3/12 for washout of cervical spinal wound and placement of irrigating wound vac  - Plastics to assist with placement of irrigating vac (please have materials ready- dakins, saline bottle, vac veraflo sponge and canister)  - Plastics to take patient back to OR on Tues 3/14 for definitive closure (case requested)  - Please obtain ESR, CRP, and CT c-spine w/o contrast   - Plastics will follow    Patient and plan discussed with Dr. Reyes, PA-JACQUI  Plastic and Reconstructive Surgery  Doc Halo, Pager [phone], Team phones: [phone]    Time spent on the assessment of patient, gathering and interpreting data, review of medical record/patient history, personally reviewing radiographic imaging and formulation of this note 60 minutes. With greater than 50% spent in personal discussion with  patient.        Electronic Signatures:  Staci Cates (PAC)  (Signed 11-Mar-2023 17:30)   Authored: Service, History of Present Illness, Allergies,  Objective, Assessment/Recommendations, Note Completion      Last Updated: 11-Mar-2023 17:30 by Staci Cates (PAC)

## 2024-03-08 ENCOUNTER — HOSPITAL ENCOUNTER (OUTPATIENT)
Dept: RADIOLOGY | Facility: HOSPITAL | Age: 69
Discharge: HOME | End: 2024-03-08
Payer: MEDICARE

## 2024-03-08 DIAGNOSIS — Z98.1 ARTHRODESIS STATUS: ICD-10-CM

## 2024-03-08 DIAGNOSIS — M47.12 CERVICAL SPONDYLOSIS WITH MYELOPATHY: ICD-10-CM

## 2024-03-08 DIAGNOSIS — G89.18 OTHER ACUTE POSTPROCEDURAL PAIN: ICD-10-CM

## 2024-03-08 DIAGNOSIS — M54.12 RADICULOPATHY, CERVICAL REGION: ICD-10-CM

## 2024-03-08 PROCEDURE — 72050 X-RAY EXAM NECK SPINE 4/5VWS: CPT | Performed by: RADIOLOGY

## 2024-03-08 PROCEDURE — 72125 CT NECK SPINE W/O DYE: CPT

## 2024-03-08 PROCEDURE — 72125 CT NECK SPINE W/O DYE: CPT | Performed by: RADIOLOGY

## 2024-03-08 PROCEDURE — 72050 X-RAY EXAM NECK SPINE 4/5VWS: CPT

## 2024-03-14 ENCOUNTER — OFFICE VISIT (OUTPATIENT)
Dept: NEUROSURGERY | Facility: CLINIC | Age: 69
End: 2024-03-14
Payer: MEDICARE

## 2024-03-14 VITALS
HEIGHT: 71 IN | HEART RATE: 103 BPM | WEIGHT: 203 LBS | DIASTOLIC BLOOD PRESSURE: 76 MMHG | TEMPERATURE: 97.7 F | BODY MASS INDEX: 28.42 KG/M2 | SYSTOLIC BLOOD PRESSURE: 142 MMHG

## 2024-03-14 DIAGNOSIS — M47.12 CERVICAL SPONDYLOSIS WITH MYELOPATHY: Primary | ICD-10-CM

## 2024-03-14 PROCEDURE — 99214 OFFICE O/P EST MOD 30 MIN: CPT | Performed by: NEUROLOGICAL SURGERY

## 2024-03-14 PROCEDURE — 3077F SYST BP >= 140 MM HG: CPT | Performed by: NEUROLOGICAL SURGERY

## 2024-03-14 PROCEDURE — 3078F DIAST BP <80 MM HG: CPT | Performed by: NEUROLOGICAL SURGERY

## 2024-03-14 PROCEDURE — 1159F MED LIST DOCD IN RCRD: CPT | Performed by: NEUROLOGICAL SURGERY

## 2024-03-14 PROCEDURE — 1125F AMNT PAIN NOTED PAIN PRSNT: CPT | Performed by: NEUROLOGICAL SURGERY

## 2024-03-14 PROCEDURE — 1160F RVW MEDS BY RX/DR IN RCRD: CPT | Performed by: NEUROLOGICAL SURGERY

## 2024-03-14 RX ORDER — ALLOPURINOL 100 MG/1
TABLET ORAL EVERY 12 HOURS
COMMUNITY
Start: 2023-04-04 | End: 2024-04-03

## 2024-03-14 ASSESSMENT — PATIENT HEALTH QUESTIONNAIRE - PHQ9
8. MOVING OR SPEAKING SO SLOWLY THAT OTHER PEOPLE COULD HAVE NOTICED. OR THE OPPOSITE, BEING SO FIGETY OR RESTLESS THAT YOU HAVE BEEN MOVING AROUND A LOT MORE THAN USUAL: MORE THAN HALF THE DAYS
10. IF YOU CHECKED OFF ANY PROBLEMS, HOW DIFFICULT HAVE THESE PROBLEMS MADE IT FOR YOU TO DO YOUR WORK, TAKE CARE OF THINGS AT HOME, OR GET ALONG WITH OTHER PEOPLE: SOMEWHAT DIFFICULT
SUM OF ALL RESPONSES TO PHQ9 QUESTIONS 1 AND 2: 3
9. THOUGHTS THAT YOU WOULD BE BETTER OFF DEAD, OR OF HURTING YOURSELF: NOT AT ALL
5. POOR APPETITE OR OVEREATING: NOT AT ALL
7. TROUBLE CONCENTRATING ON THINGS, SUCH AS READING THE NEWSPAPER OR WATCHING TELEVISION: SEVERAL DAYS
6. FEELING BAD ABOUT YOURSELF - OR THAT YOU ARE A FAILURE OR HAVE LET YOURSELF OR YOUR FAMILY DOWN: SEVERAL DAYS
2. FEELING DOWN, DEPRESSED OR HOPELESS: MORE THAN HALF THE DAYS
SUM OF ALL RESPONSES TO PHQ QUESTIONS 1-9: 8
1. LITTLE INTEREST OR PLEASURE IN DOING THINGS: SEVERAL DAYS
3. TROUBLE FALLING OR STAYING ASLEEP OR SLEEPING TOO MUCH: NOT AT ALL
4. FEELING TIRED OR HAVING LITTLE ENERGY: SEVERAL DAYS

## 2024-03-14 ASSESSMENT — ENCOUNTER SYMPTOMS: OCCASIONAL FEELINGS OF UNSTEADINESS: 0

## 2024-03-14 ASSESSMENT — PAIN SCALES - GENERAL: PAINLEVEL: 6

## 2024-03-14 NOTE — PROGRESS NOTES
[facility] [place]  Department of Neurological Surgery  Established Patient Visit    History of Present Illness  Yoan Sharp is a 68 y.o. year old male who presents to the spine clinic in follow up with his new plain x-rays of the cervical spine and CAT scan.  When I saw him 3 to 4 months ago he had had another fall and was worried that his balance had gotten worse with that last fall.  The MRI from the summer showed that he still had some persistent canal stenosis especially with the anterior disc osteophyte complexes at C3-4, C4-5, and C5-6.  Since that time 3 months ago he has continued to improve.  His left arm he can raise it over his head and that is better than he has been in years.  So even though he developed significant C5 radiculopathy after his original decompression and fusion that has improved dramatically.  As far as the balance is concerned I think that part of that is from the original injury before his surgery and now from another fall with these bulges.  Since he is getting better I think we are going to just follow this expectantly.  If at any point in time he feels his balance is affected and getting worse I told him I would be happy to consider doing a 3 level anterior cervical disc excision with interbody fusion and plate fixation at C3-4, C4-5, and C5-6.  But I told him that even if we do that operation if he continues to fall he will continue to be at risk for worsening of his neurologic condition.  We also talked today about the fact that he continues to smoke despite our best efforts to convince him to stop.    Patient's BMI is Body mass index is 28.72 kg/m².    14/14 systems reviewed and negative other than what is listed in the history of present illness    Patient Active Problem List   Diagnosis    Hyperlipemia, mixed    Benign essential hypertension    Medicare annual wellness visit, subsequent    Neuropathy    Benign prostatic hyperplasia without lower urinary  tract symptoms    Impacted cerumen    SIADH (syndrome of inappropriate ADH production) (CMS/HCC)    Cervical spondylosis with myelopathy     No past medical history on file.  Past Surgical History:   Procedure Laterality Date    CERVICAL FUSION  12/30/2022    HARDWARE REMOVAL  03/06/2023    WOUND DEBRIDEMENT       Social History     Tobacco Use    Smoking status: Every Day     Types: Cigarettes    Smokeless tobacco: Never   Substance Use Topics    Alcohol use: Yes     Alcohol/week: 0.0 - 3.0 standard drinks of alcohol     Comment: Occasionally     family history includes Heart disease in his father.    Current Outpatient Medications:     acetaminophen (Tylenol) 325 mg tablet, Take by mouth every 6 hours if needed for mild pain (1 - 3)., Disp: , Rfl:     allopurinol (Zyloprim) 100 mg tablet, Take by mouth every 12 hours., Disp: , Rfl:     amLODIPine (Norvasc) 5 mg tablet, Take 1 tablet (5 mg) by mouth once daily., Disp: 90 tablet, Rfl: 3    atorvastatin (Lipitor) 20 mg tablet, Take 1 tablet (20 mg) by mouth once daily., Disp: 90 tablet, Rfl: 3    finasteride (Proscar) 5 mg tablet, Take 1 tablet (5 mg) by mouth once daily. Do not crush, chew, or split., Disp: 90 tablet, Rfl: 3    folic acid (Folvite) 1 mg tablet, Take 1 tablet (1 mg) by mouth once daily., Disp: 90 tablet, Rfl: 3    gabapentin (Neurontin) 300 mg capsule, Take 1 capsule (300 mg) by mouth once daily in the morning AND 2 capsules (600 mg) once daily in the evening., Disp: 270 capsule, Rfl: 3    losartan (Cozaar) 100 mg tablet, Take 1 tablet (100 mg) by mouth once daily., Disp: 90 tablet, Rfl: 3    meloxicam (Mobic) 15 mg tablet, Take by mouth once daily., Disp: , Rfl:     neomycin-polymyxin-HC (Cortisporin) otic solution, Use 3 drops three times a day for 5 days, Disp: 5 mL, Rfl: 0    sennosides (Senokot) 8.6 mg tablet, Take 1 tablet (8.6 mg) by mouth once daily. 1 Tablet by mouth at bedtime for 7 days, Disp: , Rfl:   Allergies   Allergen Reactions     Iodine Unknown    Lisinopril Swelling and Unknown     Swelling of the face    Shellfish Containing Products Unknown and Other       Physical Examination:    General: NAD, AOx 3,  no aphasia or dysarthria, normal fund of knowledge  Cranial Nerves : PERRL, EOMI, Face Symm, Facial SILT, Palate/Tongue midline and symmetric  Motor: 5/5 Throughout all extremities, Sensation: SILT and PP throughout all extremities  Gait he is walking with a cane but is moving relatively well.    Results:  I personally reviewed and interpreted the imaging results which included the CAT scan shows that none of the facets fused.  He continues to have a tiny bit of C3 remaining and so the C2-3 is still somewhat stenotic.  That needs to be addressed posteriorly if and when it ever is addressed.    Assessment and Plan:      Yoan Sharp is a 68 y.o. year old male who presents to the spine clinic in follow up with the story that his balance is improving over the past month and that his arm is significantly better than it has been in years on the left side.  I think that since he continues to make progress we should follow this expectantly.  If he starts having worsening of his balance he should come back and we could talk about surgery.      I have reviewed all prior documentation and reviewed the electronic medical record since admission. I have personally have reviewed all advanced imaging not just the reports and used my interpretation as documented as the relevant findings. I have reviewed the risks and benefits of all treatment recommendations listed in this note with the patient and family. I spent a total of 30 minutes in service to this patient's care during this date of service.      The above clinical summary has been dictated with voice recognition software. It has not been proofread for grammatical errors, typographical mistakes, or other semantic inconsistencies.    Thank you for visiting our office today. It was our pleasure to  take part in your healthcare.     Do not hesitate to call with any questions regarding your plan of care after leaving. My office can be reached at [phone] M-F 8am-4pm.     To clinicians, thank you very much for this kind referral. It is a privilege to partner with you in the care of your patients. My office would be delighted to assist you with any further consultations or with questions regarding the plan of care outlined. Do not hesitate to call the office or contact me directly.     Sincerely,    Yoan Ellis MD, FAANS, FACS  Board Certified Neurological Surgeon  , Department of Neurological Surgery  [facility] [employer]    [facility]  [address]  [facility] [address]  [place] [address]    [facility]  [address]  [address]  [place] [address]    Phone: [phone]  Fax: [phone]

## 2024-04-11 DIAGNOSIS — F10.10 ALCOHOL ABUSE: ICD-10-CM

## 2024-04-11 DIAGNOSIS — I10 PRIMARY HYPERTENSION: ICD-10-CM

## 2024-04-11 DIAGNOSIS — M46.1 SACROILIITIS, NOT ELSEWHERE CLASSIFIED (CMS-HCC): ICD-10-CM

## 2024-04-11 RX ORDER — FOLIC ACID 1 MG/1
1 TABLET ORAL DAILY
Qty: 90 TABLET | Refills: 3 | Status: SHIPPED | OUTPATIENT
Start: 2024-04-11

## 2024-04-11 RX ORDER — GABAPENTIN 300 MG/1
CAPSULE ORAL
Qty: 270 CAPSULE | Refills: 3 | Status: SHIPPED | OUTPATIENT
Start: 2024-04-11

## 2024-04-11 RX ORDER — AMLODIPINE BESYLATE 5 MG/1
5 TABLET ORAL DAILY
Qty: 90 TABLET | Refills: 3 | Status: SHIPPED | OUTPATIENT
Start: 2024-04-11

## 2024-04-18 DIAGNOSIS — I10 PRIMARY HYPERTENSION: ICD-10-CM

## 2024-04-18 DIAGNOSIS — E78.2 MIXED HYPERLIPIDEMIA: ICD-10-CM

## 2024-04-18 RX ORDER — LOSARTAN POTASSIUM 100 MG/1
100 TABLET ORAL DAILY
Qty: 90 TABLET | Refills: 3 | Status: SHIPPED | OUTPATIENT
Start: 2024-04-18

## 2024-04-18 RX ORDER — ATORVASTATIN CALCIUM 20 MG/1
20 TABLET, FILM COATED ORAL DAILY
Qty: 90 TABLET | Refills: 3 | Status: SHIPPED | OUTPATIENT
Start: 2024-04-18

## 2024-04-19 NOTE — OP NOTE
Surgeon: Dr. Tucker Padilla    Assistant: Brooklynn Cloud    Preop diagnosis: Wound dehiscence    Postop diagnosis: wound dehiscence    Procedure: Wound washout and debridement, wound vac placement by plastic surgery    Blood loss: 100cc    Anesthesia: General    Complications: none    History: P is a 68 y/o male who on 12/29/22 underwent C3-T1 PCDF which was complicated by hardware pullout, and on 3/6/23 underwent hardware removal,  revision C3 laminectomy,who presented to the hospital with wound leakage and breakdown, requiring washout and revision. Plastic surgery was consulted  to assist with closure, which was decided to occur in a delayed fashion after initial wound vac placement today.    Consent: Pt understood that the risks of wound washout include but are not limited to bleeding, infection, breakdown requiring further revision. Pt understands that definitive closure will take place at a later date. Pt understood and requested that we  proceed.    Pt was brought back to the operating room and placed under general anesthesia by the anesthesia team. He was carefully flipped prone onto the divine  table. All pressure areas were well padded. The wound was covered with gauze dressing which was removed, exposing an open incision with exposed spinous process viewed inferiorly. The wound was prepped and draped in the usual sterile fashion. No local  anesthetic was used given the infection. The previous incision was partially opened, and this was deepened using blunt dissection with scissors. The inferior portion of the incision was opened with a 10 blade and carried down to the level of the spinous  process. Culture swab was taken from superficial wound above the fascia, without isrrael purulence seen. The wound was further explored and all prior sutures removed, exposing dura at the cranial aspect, without purulence encountered.  Additional cultures were sent. The entire area of paraspinal muscle and fascia was  debrided with a curette. The wound was copiously irrigated with antibiotic impregnated saline. At this point, plastic  surgery was called to place the wound vac; please see their dictation for details. The patinet  was then flipped supine on the cart and extubated by anesthesia team. He was at neurologic baseline.      Electronic Signatures:  Tucker Padilla) (Signed on 20-Mar-2023 21:59)   Co-Signer  Brooklynn Cloud (Resident)) (Signed on 19-Mar-2023 18:15)   Authored    Last Updated: 20-Mar-2023 21:59 by Tucker Padilla)

## 2024-06-04 ENCOUNTER — APPOINTMENT (OUTPATIENT)
Dept: PRIMARY CARE | Facility: CLINIC | Age: 69
End: 2024-06-04
Payer: MEDICARE

## 2024-06-14 DIAGNOSIS — Z00.00 HEALTH CARE MAINTENANCE: ICD-10-CM

## 2024-06-14 RX ORDER — MELOXICAM 15 MG/1
15 TABLET ORAL DAILY
Qty: 30 TABLET | Refills: 3 | Status: SHIPPED | OUTPATIENT
Start: 2024-06-14

## 2024-07-01 ENCOUNTER — APPOINTMENT (OUTPATIENT)
Dept: LAB | Facility: LAB | Age: 69
End: 2024-07-01
Payer: MEDICARE

## 2024-07-01 ENCOUNTER — APPOINTMENT (OUTPATIENT)
Dept: PRIMARY CARE | Facility: CLINIC | Age: 69
End: 2024-07-01
Payer: MEDICARE

## 2024-07-01 VITALS
TEMPERATURE: 97.2 F | HEART RATE: 111 BPM | BODY MASS INDEX: 27.89 KG/M2 | OXYGEN SATURATION: 94 % | DIASTOLIC BLOOD PRESSURE: 66 MMHG | HEIGHT: 71 IN | SYSTOLIC BLOOD PRESSURE: 130 MMHG | WEIGHT: 199.2 LBS

## 2024-07-01 DIAGNOSIS — Z13.820 ENCOUNTER FOR OSTEOPOROSIS SCREENING IN ASYMPTOMATIC POSTMENOPAUSAL PATIENT: ICD-10-CM

## 2024-07-01 DIAGNOSIS — Z13.820 SCREENING FOR OSTEOPOROSIS: ICD-10-CM

## 2024-07-01 DIAGNOSIS — Z78.0 ENCOUNTER FOR OSTEOPOROSIS SCREENING IN ASYMPTOMATIC POSTMENOPAUSAL PATIENT: ICD-10-CM

## 2024-07-01 DIAGNOSIS — Z12.2 ENCOUNTER FOR SCREENING FOR LUNG CANCER: ICD-10-CM

## 2024-07-01 DIAGNOSIS — I10 BENIGN ESSENTIAL HYPERTENSION: ICD-10-CM

## 2024-07-01 DIAGNOSIS — E03.9 ACQUIRED HYPOTHYROIDISM: ICD-10-CM

## 2024-07-01 DIAGNOSIS — R52 PAIN: Primary | ICD-10-CM

## 2024-07-01 DIAGNOSIS — Z87.891 AGGRESSIVE FORMER SMOKER: ICD-10-CM

## 2024-07-01 DIAGNOSIS — D50.0 IRON DEFICIENCY ANEMIA DUE TO CHRONIC BLOOD LOSS: ICD-10-CM

## 2024-07-01 DIAGNOSIS — I10 HYPERTENSION, UNSPECIFIED TYPE: ICD-10-CM

## 2024-07-01 DIAGNOSIS — E22.2 SIADH (SYNDROME OF INAPPROPRIATE ADH PRODUCTION) (MULTI): ICD-10-CM

## 2024-07-01 DIAGNOSIS — N40.0 BENIGN PROSTATIC HYPERPLASIA WITHOUT LOWER URINARY TRACT SYMPTOMS: ICD-10-CM

## 2024-07-01 DIAGNOSIS — E78.2 HYPERLIPEMIA, MIXED: ICD-10-CM

## 2024-07-01 DIAGNOSIS — Z13.6 SCREENING FOR ABDOMINAL AORTIC ANEURYSM: ICD-10-CM

## 2024-07-01 PROBLEM — F17.210 CIGARETTE SMOKER: Status: ACTIVE | Noted: 2024-07-01

## 2024-07-01 PROBLEM — G62.9 NEUROPATHY: Status: RESOLVED | Noted: 2023-08-24 | Resolved: 2024-07-01

## 2024-07-01 PROBLEM — H61.20 IMPACTED CERUMEN: Status: RESOLVED | Noted: 2024-03-04 | Resolved: 2024-07-01

## 2024-07-01 PROCEDURE — 1159F MED LIST DOCD IN RCRD: CPT | Performed by: INTERNAL MEDICINE

## 2024-07-01 PROCEDURE — 1160F RVW MEDS BY RX/DR IN RCRD: CPT | Performed by: INTERNAL MEDICINE

## 2024-07-01 PROCEDURE — 99214 OFFICE O/P EST MOD 30 MIN: CPT | Performed by: INTERNAL MEDICINE

## 2024-07-01 PROCEDURE — 3075F SYST BP GE 130 - 139MM HG: CPT | Performed by: INTERNAL MEDICINE

## 2024-07-01 PROCEDURE — 3078F DIAST BP <80 MM HG: CPT | Performed by: INTERNAL MEDICINE

## 2024-07-01 PROCEDURE — 99406 BEHAV CHNG SMOKING 3-10 MIN: CPT | Performed by: INTERNAL MEDICINE

## 2024-07-01 PROCEDURE — G0296 VISIT TO DETERM LDCT ELIG: HCPCS | Performed by: INTERNAL MEDICINE

## 2024-07-01 RX ORDER — LANOLIN ALCOHOL/MO/W.PET/CERES
1000 CREAM (GRAM) TOPICAL DAILY
COMMUNITY

## 2024-07-01 RX ORDER — IBUPROFEN 200 MG
1 TABLET ORAL EVERY 24 HOURS
Qty: 7 PATCH | Refills: 0 | Status: SHIPPED | OUTPATIENT
Start: 2024-07-01

## 2024-07-01 RX ORDER — NICOTINE 7MG/24HR
1 PATCH, TRANSDERMAL 24 HOURS TRANSDERMAL EVERY 24 HOURS
Qty: 7 PATCH | Refills: 0 | Status: SHIPPED | OUTPATIENT
Start: 2024-07-01

## 2024-07-01 RX ORDER — CYCLOBENZAPRINE HCL 10 MG
10 TABLET ORAL 2 TIMES DAILY PRN
COMMUNITY

## 2024-07-01 NOTE — ASSESSMENT & PLAN NOTE
Multifactorial history of the smoking underlying infection inflammation malignancy cannot be rule out advised oncology evaluation order CAT scan of the lung advised of smoking

## 2024-07-01 NOTE — PROGRESS NOTES
Subjective   Patient ID: Yoan Sharp is a 68 y.o. male who presents for Follow-up (3 month ) and Neck Pain.    Assessment/Plan     Problem List Items Addressed This Visit       Hyperlipemia, mixed     Monitor CMP CPK lipid once a year         Relevant Orders    Lipid Panel    Hypertension     Patients BP readings reviewed and addressed, as we age our arteries turn stiffer and less elastic. Restricting salt consumption and staying physically fit with regular exercise regimen is the only way to keep our vasculature less tonic. Studies have shown that keeping ideal body wt, exercise routine about 140 to 150 minutes a week, eating variety of plant based diet and drinking plentiful water are quite helpful. Monitor BP twice or once a week at home and bring log to be reviewed by me. Uncontrolled BP has long term consequences including heart failure, myocardial infarction, accelerated atherosclerosis and kidney dysfunction. Therapy reviewed and explained.           Relevant Orders    CT cardiac scoring wo IV contrast    Screening for osteoporosis    Relevant Orders    XR DEXA bone density    Benign prostatic hyperplasia without lower urinary tract symptoms     Ordered urinalysis and PSA today         Relevant Orders    Prostate Specific Antigen, Screen    Urine Culture    SIADH (syndrome of inappropriate ADH production) (Multi)     Multifactorial history of the smoking underlying infection inflammation malignancy cannot be rule out advised oncology evaluation order CAT scan of the lung advised of smoking         Relevant Orders    Comprehensive Metabolic Panel    TSH with reflex to Free T4 if abnormal    Aggressive former smoker    Relevant Orders    CT lung screening low dose    Iron deficiency anemia due to chronic blood loss    Relevant Orders    CBC and Auto Differential    Pain - Primary    Relevant Orders    CBC and Auto Differential    XR cervical spine 2-3 views    CK     Other Visit Diagnoses       Acquired  hypothyroidism        Relevant Orders    TSH with reflex to Free T4 if abnormal        I spent 10 minutes counseling patient about need for smoking/tobacco cessation and support discuss the nicotine  replacement therapy ,varenicline,bupropion,  hypnosis, support group, acupuncture as a potential options  .  I discussed smoking history/status that determined patient with criteria for lung cancer screening with a low-dose CT scan. By using shared decision  making we  determinded the patient will benefit from the screening, including discussion of benefit and harms of screening, follow-up diagnostic testing, overt diagnosis, false positive rate, and total radiation exposure. I counseled the patient on the importance of adhering to a annul lung cancer low-dose CT scan screening, the impact off comorbidities, and inability or unwillingness to undergo's diagnosis and treatment if abnormality discovered. I provided patient an order for low-dose CT lung cancer screening    I spent 8 minutes counseling the patient on the importance of abstaining from the tobacco/cigarette smoking and  provided information about tobacco cessation intervention I provided patient an order for tobacco suggestion therapy        HPI 68-year-old patient poor compliance on diet exercise and medicine follow-up continue to smoke today, complaining of the moderate pain in the neck and shoulder onset gradually duration few months progressed slowly aggravated by activity relieved with the rest associated history of the cervical surgery seen by Dr. Ellis advised to follow-up with [facility] continue gabapentin and ibuprofen    Meanwhile find out had SIADH with anemia probably multifactorial underlying infection inflammation malignancy cannot be rule out advised to get CAT scan of the lung endocrine workup refer to GI for EGD colonoscopy and follow-up    For pain neck pain and shoulder pain advised continue Flexeril  and Tylenol    As needed gabapentin    Hypertension continue amlodipine and Cozaar monitor CMP    BPH continue Proscar monitor PSA    Hyperlipidemia low-fat diet continue Lipitor    History of the smoking nicotine patch order strongly encouraged to stop smoking order ultrasound of the aorta and CAT scan of the lung  History reviewed. No pertinent past medical history.  Past Surgical History:   Procedure Laterality Date    CERVICAL FUSION  12/30/2022    HARDWARE REMOVAL  03/06/2023    WOUND DEBRIDEMENT       Allergies   Allergen Reactions    Iodine Unknown    Lisinopril Swelling and Unknown     Swelling of the face    Shellfish Containing Products Unknown and Other     Current Outpatient Medications   Medication Sig Dispense Refill    acetaminophen (Tylenol) 325 mg tablet Take by mouth every 6 hours if needed for mild pain (1 - 3).      amLODIPine (Norvasc) 5 mg tablet Take 1 tablet (5 mg) by mouth once daily. 90 tablet 3    atorvastatin (Lipitor) 20 mg tablet Take 1 tablet (20 mg) by mouth once daily. 90 tablet 3    cyanocobalamin (Vitamin B-12) 1,000 mcg tablet Take 1 tablet (1,000 mcg) by mouth once daily.      cyclobenzaprine (Flexeril) 10 mg tablet Take 1 tablet (10 mg) by mouth 2 times a day as needed for muscle spasms.      folic acid (Folvite) 1 mg tablet Take 1 tablet (1 mg) by mouth once daily. 90 tablet 3    gabapentin (Neurontin) 300 mg capsule Take 1 capsule (300 mg) by mouth once daily in the morning AND 2 capsules (600 mg) once daily in the evening. 270 capsule 3    losartan (Cozaar) 100 mg tablet Take 1 tablet (100 mg) by mouth once daily. 90 tablet 3    meloxicam (Mobic) 15 mg tablet Take 1 tablet (15 mg) by mouth once daily. 30 tablet 3    sennosides (Senokot) 8.6 mg tablet Take 1 tablet (8.6 mg) by mouth once daily. 1 Tablet by mouth at bedtime for 7 days       No current facility-administered medications for this visit.     Family History   Problem Relation Name Age of Onset    Heart disease  "Father       Social History     Socioeconomic History    Marital status:      Spouse name: None    Number of children: None    Years of education: None    Highest education level: None   Occupational History    None   Tobacco Use    Smoking status: Every Day     Types: Cigarettes    Smokeless tobacco: Never   Substance and Sexual Activity    Alcohol use: Not Currently     Alcohol/week: 0.0 - 6.0 standard drinks of alcohol     Comment: Occasionally    Drug use: Never    Sexual activity: None   Other Topics Concern    None   Social History Narrative    None     Social Determinants of Health     Financial Resource Strain: Not on file   Food Insecurity: Not on file   Transportation Needs: Not on file   Physical Activity: Not on file   Stress: Not on file   Social Connections: Not on file   Intimate Partner Violence: Not on file   Housing Stability: Not on file     Immunization History   Administered Date(s) Administered    Flu vaccine, quadrivalent, high-dose, preservative free, age 65y+ (FLUZONE) 11/05/2020    Influenza, Unspecified 11/05/2020    Moderna SARS-CoV-2 Vaccination 03/03/2021, 04/07/2021, 11/19/2021    Pneumococcal conjugate vaccine, 20-valent (PREVNAR 20) 03/04/2024    Pneumococcal polysaccharide vaccine, 23-valent, age 2 years and older (PNEUMOVAX 23) 10/09/2018    Tdap vaccine, age 7 year and older (BOOSTRIX, ADACEL) 06/29/2022       Review of Systems  Review of systems is otherwise negative unless stated above or in history of present illness.    Objective   Visit Vitals  /66   Pulse (!) 111   Temp 36.2 °C (97.2 °F)   Ht 1.791 m (5' 10.5\")   Wt 90.4 kg (199 lb 3.2 oz)   SpO2 94%   BMI 28.18 kg/m²   Smoking Status Every Day   BSA 2.12 m²     Physical Exam  Constitutional: BMI 28     General: not in acute distress.   HENT: Sinus congestion     Head: Normocephalic and atraumatic.      Nose: Nose normal.   Eyes: No cataracts or glaucoma     Extraocular Movements: Extraocular movements intact. "      Conjunctiva/sclera: Conjunctivae normal.   Cardiovascular: Soft systolic heart murmur     Rate and Rhythm: Normal rate ,  No M/R/G  Pulmonary: Barrel chest with diminished air entry crackles rales rhonchi bilaterally moderate     E  Skin: Clubbing of nail     General: Skin is warm.   Neurological: Cervical lumbar radiculopathy     Mental Status: He is alert and oriented to person, place, and time.   Psychiatric:   Anxiety without depression   Mood and Affect: Mood normal.         Behavior: Behavior normal.   Musculoskeletal arthritis affecting the spine  FROM in all extremitirs,  Joint-no swelling or tenderness  Hematological anemia  Endocrine SIADH sodium 132  Lab on 03/04/2024   Component Date Value Ref Range Status    WBC 03/04/2024 8.5  4.4 - 11.3 x10*3/uL Final    nRBC 03/04/2024 0.0  0.0 - 0.0 /100 WBCs Final    RBC 03/04/2024 4.20 (L)  4.50 - 5.90 x10*6/uL Final    Hemoglobin 03/04/2024 13.1 (L)  13.5 - 17.5 g/dL Final    Hematocrit 03/04/2024 40.4 (L)  41.0 - 52.0 % Final    MCV 03/04/2024 96  80 - 100 fL Final    MCH 03/04/2024 31.2  26.0 - 34.0 pg Final    MCHC 03/04/2024 32.4  32.0 - 36.0 g/dL Final    RDW 03/04/2024 12.5  11.5 - 14.5 % Final    Platelets 03/04/2024 246  150 - 450 x10*3/uL Final    Neutrophils % 03/04/2024 67.9  40.0 - 80.0 % Final    Immature Granulocytes %, Automated 03/04/2024 0.2  0.0 - 0.9 % Final    Lymphocytes % 03/04/2024 18.0  13.0 - 44.0 % Final    Monocytes % 03/04/2024 9.7  2.0 - 10.0 % Final    Eosinophils % 03/04/2024 3.5  0.0 - 6.0 % Final    Basophils % 03/04/2024 0.7  0.0 - 2.0 % Final    Neutrophils Absolute 03/04/2024 5.76  1.20 - 7.70 x10*3/uL Final    Immature Granulocytes Absolute, Au* 03/04/2024 0.02  0.00 - 0.70 x10*3/uL Final    Lymphocytes Absolute 03/04/2024 1.53  1.20 - 4.80 x10*3/uL Final    Monocytes Absolute 03/04/2024 0.82  0.10 - 1.00 x10*3/uL Final    Eosinophils Absolute 03/04/2024 0.30  0.00 - 0.70 x10*3/uL Final    Basophils Absolute 03/04/2024  0.06  0.00 - 0.10 x10*3/uL Final    Glucose 03/04/2024 90  74 - 99 mg/dL Final    Sodium 03/04/2024 132 (L)  136 - 145 mmol/L Final    Potassium 03/04/2024 4.7  3.5 - 5.3 mmol/L Final    Chloride 03/04/2024 97 (L)  98 - 107 mmol/L Final    Bicarbonate 03/04/2024 26  21 - 32 mmol/L Final    Anion Gap 03/04/2024 14  10 - 20 mmol/L Final    Urea Nitrogen 03/04/2024 16  6 - 23 mg/dL Final    Creatinine 03/04/2024 1.17  0.50 - 1.30 mg/dL Final    eGFR 03/04/2024 68  >60 mL/min/1.73m*2 Final    Calcium 03/04/2024 10.1  8.6 - 10.6 mg/dL Final    Albumin 03/04/2024 4.8  3.4 - 5.0 g/dL Final    Alkaline Phosphatase 03/04/2024 98  33 - 136 U/L Final    Total Protein 03/04/2024 7.3  6.4 - 8.2 g/dL Final    AST 03/04/2024 37  9 - 39 U/L Final    Bilirubin, Total 03/04/2024 0.5  0.0 - 1.2 mg/dL Final    ALT 03/04/2024 52  10 - 52 U/L Final    Hemoglobin A1C 03/04/2024 5.0  see below % Final    Estimated Average Glucose 03/04/2024 97  Not Established mg/dL Final    Cholesterol 03/04/2024 180  0 - 199 mg/dL Final    HDL-Cholesterol 03/04/2024 103.3  mg/dL Final    Cholesterol/HDL Ratio 03/04/2024 1.7   Final    LDL Calculated 03/04/2024 65  <=99 mg/dL Final    VLDL 03/04/2024 12  0 - 40 mg/dL Final    Triglycerides 03/04/2024 60  0 - 149 mg/dL Final    Non HDL Cholesterol 03/04/2024 77  0 - 149 mg/dL Final    Prostate Specific Antigen,Screen 03/04/2024 0.57  <=4.00 ng/mL Final    Thyroid Stimulating Hormone 03/04/2024 1.14  0.44 - 3.98 mIU/L Final    Uric Acid 03/04/2024 3.0 (L)  4.0 - 7.5 mg/dL Final       Radiology: Reviewed imaging in powerchart.  No results found.      Charting was completed using voice recognition technology and may include unintended errors.

## 2024-08-16 ENCOUNTER — HOSPITAL ENCOUNTER (OUTPATIENT)
Dept: VASCULAR MEDICINE | Facility: HOSPITAL | Age: 69
Discharge: HOME | End: 2024-08-16
Payer: MEDICARE

## 2024-08-16 DIAGNOSIS — Z13.6 SCREENING FOR ABDOMINAL AORTIC ANEURYSM: ICD-10-CM

## 2024-08-16 PROCEDURE — 76706 US ABDL AORTA SCREEN AAA: CPT

## 2024-08-16 PROCEDURE — 76706 US ABDL AORTA SCREEN AAA: CPT | Performed by: INTERNAL MEDICINE

## 2024-09-30 ENCOUNTER — LAB (OUTPATIENT)
Dept: LAB | Facility: LAB | Age: 69
End: 2024-09-30
Payer: MEDICARE

## 2024-09-30 DIAGNOSIS — E03.9 ACQUIRED HYPOTHYROIDISM: ICD-10-CM

## 2024-09-30 DIAGNOSIS — D50.0 IRON DEFICIENCY ANEMIA DUE TO CHRONIC BLOOD LOSS: ICD-10-CM

## 2024-09-30 DIAGNOSIS — N40.0 BENIGN PROSTATIC HYPERPLASIA WITHOUT LOWER URINARY TRACT SYMPTOMS: ICD-10-CM

## 2024-09-30 DIAGNOSIS — E22.2 SIADH (SYNDROME OF INAPPROPRIATE ADH PRODUCTION) (MULTI): ICD-10-CM

## 2024-09-30 DIAGNOSIS — E78.2 HYPERLIPEMIA, MIXED: ICD-10-CM

## 2024-09-30 DIAGNOSIS — R52 PAIN: ICD-10-CM

## 2024-09-30 LAB
ALBUMIN SERPL BCP-MCNC: 4.9 G/DL (ref 3.4–5)
ALP SERPL-CCNC: 102 U/L (ref 33–136)
ALT SERPL W P-5'-P-CCNC: 38 U/L (ref 10–52)
ANION GAP SERPL CALC-SCNC: 13 MMOL/L (ref 10–20)
AST SERPL W P-5'-P-CCNC: 27 U/L (ref 9–39)
BASOPHILS # BLD AUTO: 0.05 X10*3/UL (ref 0–0.1)
BASOPHILS NFR BLD AUTO: 0.9 %
BILIRUB SERPL-MCNC: 1 MG/DL (ref 0–1.2)
BUN SERPL-MCNC: 12 MG/DL (ref 6–23)
CALCIUM SERPL-MCNC: 10.3 MG/DL (ref 8.6–10.6)
CHLORIDE SERPL-SCNC: 95 MMOL/L (ref 98–107)
CHOLEST SERPL-MCNC: 215 MG/DL (ref 0–199)
CHOLESTEROL/HDL RATIO: 2.2
CK SERPL-CCNC: 94 U/L (ref 0–325)
CO2 SERPL-SCNC: 28 MMOL/L (ref 21–32)
CREAT SERPL-MCNC: 1.11 MG/DL (ref 0.5–1.3)
EGFRCR SERPLBLD CKD-EPI 2021: 72 ML/MIN/1.73M*2
EOSINOPHIL # BLD AUTO: 0.18 X10*3/UL (ref 0–0.7)
EOSINOPHIL NFR BLD AUTO: 3.2 %
ERYTHROCYTE [DISTWIDTH] IN BLOOD BY AUTOMATED COUNT: 12.7 % (ref 11.5–14.5)
GLUCOSE SERPL-MCNC: 114 MG/DL (ref 74–99)
HCT VFR BLD AUTO: 42.5 % (ref 41–52)
HDLC SERPL-MCNC: 97.6 MG/DL
HGB BLD-MCNC: 14.7 G/DL (ref 13.5–17.5)
IMM GRANULOCYTES # BLD AUTO: 0.01 X10*3/UL (ref 0–0.7)
IMM GRANULOCYTES NFR BLD AUTO: 0.2 % (ref 0–0.9)
LDLC SERPL CALC-MCNC: 104 MG/DL
LYMPHOCYTES # BLD AUTO: 1.61 X10*3/UL (ref 1.2–4.8)
LYMPHOCYTES NFR BLD AUTO: 28.6 %
MCH RBC QN AUTO: 31.7 PG (ref 26–34)
MCHC RBC AUTO-ENTMCNC: 34.6 G/DL (ref 32–36)
MCV RBC AUTO: 92 FL (ref 80–100)
MONOCYTES # BLD AUTO: 0.53 X10*3/UL (ref 0.1–1)
MONOCYTES NFR BLD AUTO: 9.4 %
NEUTROPHILS # BLD AUTO: 3.24 X10*3/UL (ref 1.2–7.7)
NEUTROPHILS NFR BLD AUTO: 57.7 %
NON HDL CHOLESTEROL: 117 MG/DL (ref 0–149)
NRBC BLD-RTO: 0 /100 WBCS (ref 0–0)
PLATELET # BLD AUTO: 258 X10*3/UL (ref 150–450)
POTASSIUM SERPL-SCNC: 4.7 MMOL/L (ref 3.5–5.3)
PROT SERPL-MCNC: 7.5 G/DL (ref 6.4–8.2)
PSA SERPL-MCNC: 0.49 NG/ML
RBC # BLD AUTO: 4.64 X10*6/UL (ref 4.5–5.9)
SODIUM SERPL-SCNC: 131 MMOL/L (ref 136–145)
TRIGL SERPL-MCNC: 68 MG/DL (ref 0–149)
TSH SERPL-ACNC: 2.39 MIU/L (ref 0.44–3.98)
VLDL: 14 MG/DL (ref 0–40)
WBC # BLD AUTO: 5.6 X10*3/UL (ref 4.4–11.3)

## 2024-09-30 PROCEDURE — 82550 ASSAY OF CK (CPK): CPT

## 2024-09-30 PROCEDURE — 84443 ASSAY THYROID STIM HORMONE: CPT

## 2024-09-30 PROCEDURE — 85025 COMPLETE CBC W/AUTO DIFF WBC: CPT

## 2024-09-30 PROCEDURE — 80053 COMPREHEN METABOLIC PANEL: CPT

## 2024-09-30 PROCEDURE — 84153 ASSAY OF PSA TOTAL: CPT

## 2024-09-30 PROCEDURE — 80061 LIPID PANEL: CPT

## 2024-09-30 PROCEDURE — 36415 COLL VENOUS BLD VENIPUNCTURE: CPT

## 2024-10-01 ENCOUNTER — TELEPHONE (OUTPATIENT)
Dept: PRIMARY CARE | Facility: CLINIC | Age: 69
End: 2024-10-01
Payer: MEDICARE

## 2024-10-01 NOTE — TELEPHONE ENCOUNTER
----- Message from Marcio Arias sent at 10/1/2024 10:26 AM EDT -----  SIADH sodium 131 cholesterol 215     Advised water restriction 1200 cc a day check thyroid sodium level and LDL again get a flu vaccine from pharmacy    Increase Lipitor to 40 mg a day #90 to keep LDL less than 80 and cholesterol less than 175 follow-up

## 2024-10-03 ENCOUNTER — APPOINTMENT (OUTPATIENT)
Dept: PRIMARY CARE | Facility: CLINIC | Age: 69
End: 2024-10-03
Payer: MEDICARE

## 2024-10-03 VITALS
HEART RATE: 96 BPM | SYSTOLIC BLOOD PRESSURE: 139 MMHG | OXYGEN SATURATION: 97 % | BODY MASS INDEX: 27.58 KG/M2 | HEIGHT: 71 IN | WEIGHT: 197 LBS | DIASTOLIC BLOOD PRESSURE: 79 MMHG | TEMPERATURE: 97.3 F

## 2024-10-03 DIAGNOSIS — M54.16 LUMBAR RADICULOPATHY: ICD-10-CM

## 2024-10-03 DIAGNOSIS — F17.210 CIGARETTE SMOKER: ICD-10-CM

## 2024-10-03 DIAGNOSIS — E22.2 SIADH (SYNDROME OF INAPPROPRIATE ADH PRODUCTION) (MULTI): ICD-10-CM

## 2024-10-03 DIAGNOSIS — N40.0 BENIGN PROSTATIC HYPERPLASIA, UNSPECIFIED WHETHER LOWER URINARY TRACT SYMPTOMS PRESENT: Primary | ICD-10-CM

## 2024-10-03 DIAGNOSIS — N39.0 UTI (URINARY TRACT INFECTION), UNCOMPLICATED: ICD-10-CM

## 2024-10-03 DIAGNOSIS — M47.12 CERVICAL SPONDYLOSIS WITH MYELOPATHY: ICD-10-CM

## 2024-10-03 DIAGNOSIS — E78.2 MIXED HYPERLIPIDEMIA: ICD-10-CM

## 2024-10-03 PROBLEM — Z13.820 SCREENING FOR OSTEOPOROSIS: Status: RESOLVED | Noted: 2023-08-24 | Resolved: 2024-10-03

## 2024-10-03 PROBLEM — Z87.891 AGGRESSIVE FORMER SMOKER: Status: RESOLVED | Noted: 2024-07-01 | Resolved: 2024-10-03

## 2024-10-03 PROBLEM — I10 HYPERTENSION: Status: RESOLVED | Noted: 2023-04-04 | Resolved: 2024-10-03

## 2024-10-03 PROBLEM — D50.0 IRON DEFICIENCY ANEMIA DUE TO CHRONIC BLOOD LOSS: Status: RESOLVED | Noted: 2024-07-01 | Resolved: 2024-10-03

## 2024-10-03 PROCEDURE — G0296 VISIT TO DETERM LDCT ELIG: HCPCS | Performed by: INTERNAL MEDICINE

## 2024-10-03 PROCEDURE — 3008F BODY MASS INDEX DOCD: CPT | Performed by: INTERNAL MEDICINE

## 2024-10-03 PROCEDURE — 1123F ACP DISCUSS/DSCN MKR DOCD: CPT | Performed by: INTERNAL MEDICINE

## 2024-10-03 PROCEDURE — G2211 COMPLEX E/M VISIT ADD ON: HCPCS | Performed by: INTERNAL MEDICINE

## 2024-10-03 PROCEDURE — 99214 OFFICE O/P EST MOD 30 MIN: CPT | Performed by: INTERNAL MEDICINE

## 2024-10-03 PROCEDURE — 1159F MED LIST DOCD IN RCRD: CPT | Performed by: INTERNAL MEDICINE

## 2024-10-03 PROCEDURE — 4004F PT TOBACCO SCREEN RCVD TLK: CPT | Performed by: INTERNAL MEDICINE

## 2024-10-03 PROCEDURE — 1160F RVW MEDS BY RX/DR IN RCRD: CPT | Performed by: INTERNAL MEDICINE

## 2024-10-03 RX ORDER — ATORVASTATIN CALCIUM 40 MG/1
40 TABLET, FILM COATED ORAL DAILY
Qty: 90 TABLET | Refills: 3 | Status: SHIPPED | OUTPATIENT
Start: 2024-10-03

## 2024-10-03 RX ORDER — FINASTERIDE 5 MG/1
5 TABLET, FILM COATED ORAL DAILY
Qty: 90 TABLET | Refills: 3 | Status: SHIPPED | OUTPATIENT
Start: 2024-10-03 | End: 2025-10-03

## 2024-10-03 ASSESSMENT — ENCOUNTER SYMPTOMS
DEPRESSION: 0
OCCASIONAL FEELINGS OF UNSTEADINESS: 1
LOSS OF SENSATION IN FEET: 0

## 2024-10-03 NOTE — PROGRESS NOTES
Subjective   Patient ID: Yoan Sharp is a 69 y.o. male who presents for Follow-up (3 month /Bladder issue /Go over blood work ).    Assessment/Plan     Problem List Items Addressed This Visit       Mixed hyperlipidemia    Benign prostatic hyperplasia - Primary     Given Proscar 5 mg a day         SIADH (syndrome of inappropriate ADH production) (Multi)     Refer to nephrology  cut down water intake check cortisol thyroid and sodium level         Cervical spondylosis with myelopathy     Cervical lumbar radiculopathy advised therapy fall prevention         Relevant Orders    [facility]erral to [facility]    Cigarette smoker     Ultrasound of aorta calcium score for heart low-dose CAT scan of the lung nicotine patch    I discussed smoking history/status that determined patient with criteria for lung cancer screening with a low-dose CT scan. By using shared decision  making we  determinded the patient will benefit from the screening, including discussion of benefit and harms of screening, follow-up diagnostic testing, overt diagnosis, false positive rate, and total radiation exposure. I counseled the patient on the importance of adhering to a annul lung cancer low-dose CT scan screening, the impact off comorbidities, and inability or unwillingness to undergo's diagnosis and treatment if abnormality discovered. I provided patient an order for low-dose CT lung cancer screening    I spent 8 minutes counseling the patient on the importance of abstaining from the tobacco/cigarette smoking and  provided information about tobacco cessation intervention I provided patient an order for tobacco suggestion therapy             UTI (urinary tract infection), uncomplicated     Check UA CNS take probiotics         Relevant Orders    Urinalysis with Reflex Microscopic    Lumbar radiculopathy     Patient was evaluated today, problem list was reviewed, problems and concerns addressed, Rx list reviewed and updated, lab and tests  were noted and reviewed. Life style changes were discussed, always it works better if we eat plant based diet and plenty of fibres and roughage. Consume adequate amount of water and avoid alcohol, light to moderate physical activities and stress reduction are always beneficial for ongoing physical well being. Do not forget to have 6 to 7 hours of sleep regularly and avoid late night wilmer screen exposure.    HPI 69-year-old patient who continue to smoke history of hypertension hyperlipidemia neck pain back pain high risk for falls cervical lumbar radiculopathy complaining of the urinary discomfort urgency frequency dribbling onset gradually duration few months progress slowly aggravating factor BPH and also associated with a neurogenic bladder clinically    Advised to get a Proscar 5 mg a day    Cholesterol is high advise continue Lipitor increased from 20 to 40 mg a day    Low sodium cut down water intake sent for the thyroid and cortisol level if low sodium is ongoing problem with his the smoking possibility of the chronic lung disease were last evaluated by the nephrologist.    Advise stop smoking    Follow-up advised to get a low-dose CAT scan of the heart and lungs  History reviewed. No pertinent past medical history.  Past Surgical History:   Procedure Laterality Date    CERVICAL FUSION  12/30/2022    HARDWARE REMOVAL  03/06/2023    WOUND DEBRIDEMENT       Allergies   Allergen Reactions    Iodine Unknown    Lisinopril Swelling and Unknown     Swelling of the face    Shellfish Containing Products Unknown and Other     Current Outpatient Medications   Medication Sig Dispense Refill    acetaminophen (Tylenol) 325 mg tablet Take by mouth every 6 hours if needed for mild pain (1 - 3).      amLODIPine (Norvasc) 5 mg tablet Take 1 tablet (5 mg) by mouth once daily. 90 tablet 3    atorvastatin (Lipitor) 20 mg tablet Take 1 tablet (20 mg) by mouth once daily. 90 tablet 3    cyanocobalamin (Vitamin B-12) 1,000 mcg tablet  Take 1 tablet (1,000 mcg) by mouth once daily.      folic acid (Folvite) 1 mg tablet Take 1 tablet (1 mg) by mouth once daily. 90 tablet 3    gabapentin (Neurontin) 300 mg capsule Take 1 capsule (300 mg) by mouth once daily in the morning AND 2 capsules (600 mg) once daily in the evening. 270 capsule 3    losartan (Cozaar) 100 mg tablet Take 1 tablet (100 mg) by mouth once daily. 90 tablet 3    meloxicam (Mobic) 15 mg tablet Take 1 tablet (15 mg) by mouth once daily. 30 tablet 3    sennosides (Senokot) 8.6 mg tablet Take 1 tablet (8.6 mg) by mouth once daily. 1 Tablet by mouth at bedtime for 7 days       No current facility-administered medications for this visit.     Family History   Problem Relation Name Age of Onset    Heart disease Father       Social History     Socioeconomic History    Marital status:    Tobacco Use    Smoking status: Every Day     Current packs/day: 0.50     Average packs/day: 0.5 packs/day for 29.8 years (14.9 ttl pk-yrs)     Types: Cigarettes     Start date: 1995     Passive exposure: Past    Smokeless tobacco: Never    Tobacco comments:     Nicotine patch   Substance and Sexual Activity    Alcohol use: Yes     Alcohol/week: 0.0 - 6.0 standard drinks of alcohol     Comment: Occasionally    Drug use: Never     Comment: Advise stop drinking     Immunization History   Administered Date(s) Administered    Flu vaccine, quadrivalent, high-dose, preservative free, age 65y+ (FLUZONE) 11/05/2020    Influenza, Unspecified 11/05/2020    Moderna SARS-CoV-2 Vaccination 03/03/2021, 04/07/2021, 11/19/2021    Pneumococcal conjugate vaccine, 20-valent (PREVNAR 20) 03/04/2024    Pneumococcal polysaccharide vaccine, 23-valent, age 2 years and older (PNEUMOVAX 23) 10/09/2018    Tdap vaccine, age 7 year and older (BOOSTRIX, ADACEL) 06/29/2022       Review of Systems  Review of systems is otherwise negative unless stated above or in history of present illness.    Objective   Visit Vitals  /79  "  Pulse 96   Temp 36.3 °C (97.3 °F)   Ht 1.791 m (5' 10.5\")   Wt 89.4 kg (197 lb)   SpO2 97%   BMI 27.87 kg/m²   Smoking Status Every Day   BSA 2.11 m²     Physical Exam  Constitutional:       General: not in acute distress.   HENT:      Head: Normocephalic and atraumatic.      Nose: Nose normal.   Eyes:      Extraocular Movements: Extraocular movements intact.      Conjunctiva/sclera: Conjunctivae normal.   Cardiovascular:      Rate and Rhythm: Normal rate ,  No M/R/G  Pulmonary:      Effort: Pulmonary effort is normal.      Breath sounds: Normal, Bilat Equal AE  Skin:     General: Skin is warm.   Neurological:      Mental Status: He is alert and oriented to person, place, and time.   Psychiatric:         Mood and Affect: Mood normal.         Behavior: Behavior normal.   Musculoskeletal   FROM in all extremitirs,  Joint-no swelling or tenderness    Lab on 09/30/2024   Component Date Value Ref Range Status    WBC 09/30/2024 5.6  4.4 - 11.3 x10*3/uL Final    nRBC 09/30/2024 0.0  0.0 - 0.0 /100 WBCs Final    RBC 09/30/2024 4.64  4.50 - 5.90 x10*6/uL Final    Hemoglobin 09/30/2024 14.7  13.5 - 17.5 g/dL Final    Hematocrit 09/30/2024 42.5  41.0 - 52.0 % Final    MCV 09/30/2024 92  80 - 100 fL Final    MCH 09/30/2024 31.7  26.0 - 34.0 pg Final    MCHC 09/30/2024 34.6  32.0 - 36.0 g/dL Final    RDW 09/30/2024 12.7  11.5 - 14.5 % Final    Platelets 09/30/2024 258  150 - 450 x10*3/uL Final    Neutrophils % 09/30/2024 57.7  40.0 - 80.0 % Final    Immature Granulocytes %, Automated 09/30/2024 0.2  0.0 - 0.9 % Final    Lymphocytes % 09/30/2024 28.6  13.0 - 44.0 % Final    Monocytes % 09/30/2024 9.4  2.0 - 10.0 % Final    Eosinophils % 09/30/2024 3.2  0.0 - 6.0 % Final    Basophils % 09/30/2024 0.9  0.0 - 2.0 % Final    Neutrophils Absolute 09/30/2024 3.24  1.20 - 7.70 x10*3/uL Final    Immature Granulocytes Absolute, Au* 09/30/2024 0.01  0.00 - 0.70 x10*3/uL Final    Lymphocytes Absolute 09/30/2024 1.61  1.20 - 4.80 x10*3/uL " Final    Monocytes Absolute 09/30/2024 0.53  0.10 - 1.00 x10*3/uL Final    Eosinophils Absolute 09/30/2024 0.18  0.00 - 0.70 x10*3/uL Final    Basophils Absolute 09/30/2024 0.05  0.00 - 0.10 x10*3/uL Final    Glucose 09/30/2024 114 (H)  74 - 99 mg/dL Final    Sodium 09/30/2024 131 (L)  136 - 145 mmol/L Final    Potassium 09/30/2024 4.7  3.5 - 5.3 mmol/L Final    Chloride 09/30/2024 95 (L)  98 - 107 mmol/L Final    Bicarbonate 09/30/2024 28  21 - 32 mmol/L Final    Anion Gap 09/30/2024 13  10 - 20 mmol/L Final    Urea Nitrogen 09/30/2024 12  6 - 23 mg/dL Final    Creatinine 09/30/2024 1.11  0.50 - 1.30 mg/dL Final    eGFR 09/30/2024 72  >60 mL/min/1.73m*2 Final    Calcium 09/30/2024 10.3  8.6 - 10.6 mg/dL Final    Albumin 09/30/2024 4.9  3.4 - 5.0 g/dL Final    Alkaline Phosphatase 09/30/2024 102  33 - 136 U/L Final    Total Protein 09/30/2024 7.5  6.4 - 8.2 g/dL Final    AST 09/30/2024 27  9 - 39 U/L Final    Bilirubin, Total 09/30/2024 1.0  0.0 - 1.2 mg/dL Final    ALT 09/30/2024 38  10 - 52 U/L Final    Cholesterol 09/30/2024 215 (H)  0 - 199 mg/dL Final    HDL-Cholesterol 09/30/2024 97.6  mg/dL Final    Cholesterol/HDL Ratio 09/30/2024 2.2   Final    LDL Calculated 09/30/2024 104 (H)  <=99 mg/dL Final    VLDL 09/30/2024 14  0 - 40 mg/dL Final    Triglycerides 09/30/2024 68  0 - 149 mg/dL Final    Non HDL Cholesterol 09/30/2024 117  0 - 149 mg/dL Final    Prostate Specific Antigen,Screen 09/30/2024 0.49  <=4.00 ng/mL Final    Thyroid Stimulating Hormone 09/30/2024 2.39  0.44 - 3.98 mIU/L Final    Creatine Kinase 09/30/2024 94  0 - 325 U/L Final       Radiology: Reviewed imaging in powerchart.  No results found.      Charting was completed using voice recognition technology and may include unintended errors.     Patient was identified as a fall risk. Risk prevention instructions provided.

## 2024-10-03 NOTE — ASSESSMENT & PLAN NOTE
Ultrasound of aorta calcium score for heart low-dose CAT scan of the lung nicotine patch    I discussed smoking history/status that determined patient with criteria for lung cancer screening with a low-dose CT scan. By using shared decision  making we  determinded the patient will benefit from the screening, including discussion of benefit and harms of screening, follow-up diagnostic testing, overt diagnosis, false positive rate, and total radiation exposure. I counseled the patient on the importance of adhering to a annul lung cancer low-dose CT scan screening, the impact off comorbidities, and inability or unwillingness to undergo's diagnosis and treatment if abnormality discovered. I provided patient an order for low-dose CT lung cancer screening    I spent 8 minutes counseling the patient on the importance of abstaining from the tobacco/cigarette smoking and  provided information about tobacco cessation intervention I provided patient an order for tobacco suggestion therapy

## 2024-12-16 DIAGNOSIS — E78.2 MIXED HYPERLIPIDEMIA: ICD-10-CM

## 2024-12-16 DIAGNOSIS — Z00.00 HEALTH CARE MAINTENANCE: ICD-10-CM

## 2024-12-16 RX ORDER — ATORVASTATIN CALCIUM 40 MG/1
40 TABLET, FILM COATED ORAL DAILY
Qty: 90 TABLET | Refills: 3 | Status: SHIPPED | OUTPATIENT
Start: 2024-12-16

## 2024-12-16 RX ORDER — MELOXICAM 15 MG/1
15 TABLET ORAL DAILY
Qty: 90 TABLET | Refills: 1 | Status: SHIPPED | OUTPATIENT
Start: 2024-12-16

## 2025-01-23 ENCOUNTER — APPOINTMENT (OUTPATIENT)
Dept: PRIMARY CARE | Facility: CLINIC | Age: 70
End: 2025-01-23
Payer: MEDICARE

## 2025-02-04 ENCOUNTER — OFFICE VISIT (OUTPATIENT)
Dept: PRIMARY CARE | Facility: CLINIC | Age: 70
End: 2025-02-04
Payer: MEDICARE

## 2025-02-04 VITALS
WEIGHT: 197 LBS | DIASTOLIC BLOOD PRESSURE: 86 MMHG | BODY MASS INDEX: 27.58 KG/M2 | HEIGHT: 71 IN | TEMPERATURE: 98.2 F | OXYGEN SATURATION: 96 % | HEART RATE: 102 BPM | SYSTOLIC BLOOD PRESSURE: 146 MMHG

## 2025-02-04 DIAGNOSIS — M54.16 LUMBAR RADICULOPATHY: ICD-10-CM

## 2025-02-04 DIAGNOSIS — M47.12 CERVICAL SPONDYLOSIS WITH MYELOPATHY: ICD-10-CM

## 2025-02-04 DIAGNOSIS — Z12.2 ENCOUNTER FOR SCREENING FOR LUNG CANCER: ICD-10-CM

## 2025-02-04 DIAGNOSIS — F17.210 CIGARETTE SMOKER: ICD-10-CM

## 2025-02-04 DIAGNOSIS — R73.9 HYPERGLYCEMIA: ICD-10-CM

## 2025-02-04 DIAGNOSIS — E78.2 MIXED HYPERLIPIDEMIA: ICD-10-CM

## 2025-02-04 DIAGNOSIS — N39.0 UTI (URINARY TRACT INFECTION), UNCOMPLICATED: ICD-10-CM

## 2025-02-04 DIAGNOSIS — Z00.00 MEDICARE ANNUAL WELLNESS VISIT, SUBSEQUENT: Primary | ICD-10-CM

## 2025-02-04 DIAGNOSIS — E22.2 SIADH (SYNDROME OF INAPPROPRIATE ADH PRODUCTION) (MULTI): ICD-10-CM

## 2025-02-04 DIAGNOSIS — N40.0 BENIGN PROSTATIC HYPERPLASIA WITHOUT LOWER URINARY TRACT SYMPTOMS: ICD-10-CM

## 2025-02-04 DIAGNOSIS — Z87.891 AGGRESSIVE FORMER SMOKER: ICD-10-CM

## 2025-02-04 PROCEDURE — 3008F BODY MASS INDEX DOCD: CPT | Performed by: INTERNAL MEDICINE

## 2025-02-04 PROCEDURE — 1170F FXNL STATUS ASSESSED: CPT | Performed by: INTERNAL MEDICINE

## 2025-02-04 PROCEDURE — G0296 VISIT TO DETERM LDCT ELIG: HCPCS | Performed by: INTERNAL MEDICINE

## 2025-02-04 PROCEDURE — 1123F ACP DISCUSS/DSCN MKR DOCD: CPT | Performed by: INTERNAL MEDICINE

## 2025-02-04 PROCEDURE — 99497 ADVNCD CARE PLAN 30 MIN: CPT | Performed by: INTERNAL MEDICINE

## 2025-02-04 PROCEDURE — G0439 PPPS, SUBSEQ VISIT: HCPCS | Performed by: INTERNAL MEDICINE

## 2025-02-04 PROCEDURE — 1159F MED LIST DOCD IN RCRD: CPT | Performed by: INTERNAL MEDICINE

## 2025-02-04 PROCEDURE — 99213 OFFICE O/P EST LOW 20 MIN: CPT | Performed by: INTERNAL MEDICINE

## 2025-02-04 PROCEDURE — 1160F RVW MEDS BY RX/DR IN RCRD: CPT | Performed by: INTERNAL MEDICINE

## 2025-02-04 RX ORDER — FLUTICASONE FUROATE, UMECLIDINIUM BROMIDE AND VILANTEROL TRIFENATATE 100; 62.5; 25 UG/1; UG/1; UG/1
1 POWDER RESPIRATORY (INHALATION) DAILY
Qty: 60 EACH | Refills: 0 | Status: SHIPPED | OUTPATIENT
Start: 2025-02-04

## 2025-02-04 RX ORDER — PREDNISONE 20 MG/1
20 TABLET ORAL DAILY
Qty: 10 TABLET | Refills: 0 | Status: SHIPPED | OUTPATIENT
Start: 2025-02-04

## 2025-02-04 RX ORDER — LEVOFLOXACIN 500 MG/1
500 TABLET, FILM COATED ORAL DAILY
Qty: 10 TABLET | Refills: 0 | Status: SHIPPED | OUTPATIENT
Start: 2025-02-04 | End: 2025-02-14

## 2025-02-04 ASSESSMENT — ACTIVITIES OF DAILY LIVING (ADL)
DRESSING: INDEPENDENT
DOING_HOUSEWORK: INDEPENDENT
TAKING_MEDICATION: INDEPENDENT
BATHING: INDEPENDENT
GROCERY_SHOPPING: INDEPENDENT
MANAGING_FINANCES: INDEPENDENT

## 2025-02-04 ASSESSMENT — PATIENT HEALTH QUESTIONNAIRE - PHQ9
1. LITTLE INTEREST OR PLEASURE IN DOING THINGS: NOT AT ALL
2. FEELING DOWN, DEPRESSED OR HOPELESS: NOT AT ALL
1. LITTLE INTEREST OR PLEASURE IN DOING THINGS: NOT AT ALL
SUM OF ALL RESPONSES TO PHQ9 QUESTIONS 1 AND 2: 0
SUM OF ALL RESPONSES TO PHQ9 QUESTIONS 1 AND 2: 0
2. FEELING DOWN, DEPRESSED OR HOPELESS: NOT AT ALL

## 2025-02-04 NOTE — ASSESSMENT & PLAN NOTE
Secondary to the COPD advised to get low-dose CAT scan of the lung PFT follow-up with the pulmonary Dr. Magaña given prednisone Trelegy Levaquin probiotic follow-up

## 2025-02-04 NOTE — ASSESSMENT & PLAN NOTE
Stop smoking I spent 10 minutes counseling patient about need for smoking/tobacco cessation and support discuss the nicotine  replacement therapy ,varenicline,bupropion,  hypnosis, support group, acupuncture as a potential options  .  I discussed smoking history/status that determined patient with criteria for lung cancer screening with a low-dose CT scan. By using shared decision  making we  determinded the patient will benefit from the screening, including discussion of benefit and harms of screening, follow-up diagnostic testing, overt diagnosis, false positive rate, and total radiation exposure. I counseled the patient on the importance of adhering to a annul lung cancer low-dose CT scan screening, the impact off comorbidities, and inability or unwillingness to undergo's diagnosis and treatment if abnormality discovered. I provided patient an order for low-dose CT lung cancer screening    I spent 8 minutes counseling the patient on the importance of abstaining from the tobacco/cigarette smoking and  provided information about tobacco cessation intervention I provided patient an order for tobacco suggestion therapy

## 2025-02-04 NOTE — PROGRESS NOTES
Assessment and Plan:  Problem List Items Addressed This Visit       Mixed hyperlipidemia     Advise low-fat diet plus Lipitor 40 mg a day check CMP CPK lipid twice a year         Relevant Orders    Albumin-Creatinine Ratio, Urine Random    CBC and Auto Differential    Comprehensive Metabolic Panel    Hemoglobin A1C    Lipid Panel    Magnesium    Urine Culture    Uric Acid    TSH with reflex to Free T4 if abnormal    Prostate Specific Antigen, Screen    Colonoscopy Screening; Average Risk Patient    Encounter for screening for lung cancer    Relevant Orders    Albumin-Creatinine Ratio, Urine Random    CBC and Auto Differential    Comprehensive Metabolic Panel    Hemoglobin A1C    Lipid Panel    Magnesium    Urine Culture    Uric Acid    TSH with reflex to Free T4 if abnormal    Prostate Specific Antigen, Screen    Colonoscopy Screening; Average Risk Patient    CT lung screening low dose    Benign prostatic hyperplasia     Monitor PSA urinalysis follow-up         Relevant Orders    Albumin-Creatinine Ratio, Urine Random    CBC and Auto Differential    Comprehensive Metabolic Panel    Hemoglobin A1C    Lipid Panel    Magnesium    Urine Culture    Uric Acid    TSH with reflex to Free T4 if abnormal    Prostate Specific Antigen, Screen    Colonoscopy Screening; Average Risk Patient    SIADH (syndrome of inappropriate ADH production) (Multi)     Secondary to the COPD advised to get low-dose CAT scan of the lung PFT follow-up with the pulmonary Dr. Magaña given prednisone Trelegy Levaquin probiotic follow-up         Relevant Orders    Albumin-Creatinine Ratio, Urine Random    CBC and Auto Differential    Comprehensive Metabolic Panel    Hemoglobin A1C    Lipid Panel    Magnesium    Urine Culture    Uric Acid    TSH with reflex to Free T4 if abnormal    Prostate Specific Antigen, Screen    Colonoscopy Screening; Average Risk Patient    Cervical spondylosis with myelopathy     Cervical lumbar radiculopathy advised PT OT and          Relevant Orders    Albumin-Creatinine Ratio, Urine Random    CBC and Auto Differential    Comprehensive Metabolic Panel    Hemoglobin A1C    Lipid Panel    Magnesium    Urine Culture    Uric Acid    TSH with reflex to Free T4 if abnormal    Prostate Specific Antigen, Screen    Colonoscopy Screening; Average Risk Patient    Cigarette smoker     Stop smoking I spent 10 minutes counseling patient about need for smoking/tobacco cessation and support discuss the nicotine  replacement therapy ,varenicline,bupropion,  hypnosis, support group, acupuncture as a potential options  .  I discussed smoking history/status that determined patient with criteria for lung cancer screening with a low-dose CT scan. By using shared decision  making we  determinded the patient will benefit from the screening, including discussion of benefit and harms of screening, follow-up diagnostic testing, overt diagnosis, false positive rate, and total radiation exposure. I counseled the patient on the importance of adhering to a annul lung cancer low-dose CT scan screening, the impact off comorbidities, and inability or unwillingness to undergo's diagnosis and treatment if abnormality discovered. I provided patient an order for low-dose CT lung cancer screening    I spent 8 minutes counseling the patient on the importance of abstaining from the tobacco/cigarette smoking and  provided information about tobacco cessation intervention I provided patient an order for tobacco suggestion therapy             Relevant Orders    Albumin-Creatinine Ratio, Urine Random    CBC and Auto Differential    Comprehensive Metabolic Panel    Hemoglobin A1C    Lipid Panel    Magnesium    Urine Culture    Uric Acid    TSH with reflex to Free T4 if abnormal    Prostate Specific Antigen, Screen    Colonoscopy Screening; Average Risk Patient    RESOLVED: UTI (urinary tract infection), uncomplicated    Relevant Orders    Albumin-Creatinine Ratio, Urine Random    CBC  and Auto Differential    Comprehensive Metabolic Panel    Hemoglobin A1C    Lipid Panel    Magnesium    Urine Culture    Uric Acid    TSH with reflex to Free T4 if abnormal    Prostate Specific Antigen, Screen    Colonoscopy Screening; Average Risk Patient    Lumbar radiculopathy    Relevant Orders    Albumin-Creatinine Ratio, Urine Random    CBC and Auto Differential    Comprehensive Metabolic Panel    Hemoglobin A1C    Lipid Panel    Magnesium    Urine Culture    Uric Acid    TSH with reflex to Free T4 if abnormal    Prostate Specific Antigen, Screen    Colonoscopy Screening; Average Risk Patient     Other Visit Diagnoses       Medicare annual wellness visit, subsequent    -  Primary    Relevant Orders    Albumin-Creatinine Ratio, Urine Random    CBC and Auto Differential    Comprehensive Metabolic Panel    Hemoglobin A1C    Lipid Panel    Magnesium    Urine Culture    Uric Acid    TSH with reflex to Free T4 if abnormal    Prostate Specific Antigen, Screen    Colonoscopy Screening; Average Risk Patient    Hyperglycemia        Relevant Orders    Hemoglobin A1C    Aggressive former smoker        Relevant Orders    CT lung screening low dose              Chief Complaint:   Annual Medicare Wellness Office Exam/Comprehensive Problem Focused Follow Up and Physical Exam     Marcio Arias MD  10/1/2024 10:26 AM EDT Back to Top      SIADH sodium 131 cholesterol 215      Advised water restriction 1200 cc a day check thyroid sodium level and LDL again get a flu vaccine from pharmacy     Increase Lipitor to 40 mg a day #90 to keep LDL less than 80 and cholesterol less than 175 follow-up     HPI: This is a 69-year-old patient  with a 4 children    Brother  at 56 from the heart problem    Sister have a hypertension    Mother 93 passed old age    Father of heart attack    Do smoke to drink    History of the neck surgery Dr. Ellis    Personal history of COPD gastritis cervical lumbar radiculopathy  proteinuria hypertension hyperlipidemia BPH    Complaint of the cough congestion arthralgia myalgia fatigue tired wheezing sensation onset acutely duration 2 weeks progressive acutely aggravating factor change of the weather microaspiration acid reflux hiatal hernia    Negative for hypoxia hypoglycemia hypotension or fall    Negative for hemohemoptysis hematuria rectal bleeding jaundice    Try OTC medicine does not help    Review lab medication advised to get eye checkup dental checkup refer to gastroenterology for EGD colonoscopy refer to Dr. Magaña further PFT and a low-dose CAT scan of the lung    Advise stop smoking use nicotine gum        Patient Active Problem List:  Patient Active Problem List   Diagnosis    Mixed hyperlipidemia    Encounter for screening for lung cancer    Benign prostatic hyperplasia    SIADH (syndrome of inappropriate ADH production) (Multi)    Cervical spondylosis with myelopathy    Cigarette smoker    Pain    Lumbar radiculopathy          Comprehensive Medical/Surgical/Social/Family History:  Family History   Problem Relation Name Age of Onset    Heart disease Father         History reviewed. No pertinent past medical history.    Past Surgical History:   Procedure Laterality Date    CERVICAL FUSION  12/30/2022    HARDWARE REMOVAL  03/06/2023    WOUND DEBRIDEMENT         Social History     Socioeconomic History    Marital status:    Tobacco Use    Smoking status: Every Day     Current packs/day: 0.50     Average packs/day: 0.5 packs/day for 30.1 years (15.0 ttl pk-yrs)     Types: Cigarettes     Start date: 1995     Passive exposure: Past    Smokeless tobacco: Never    Tobacco comments:     Nicotine patch   Substance and Sexual Activity    Alcohol use: Yes     Alcohol/week: 0.0 - 6.0 standard drinks of alcohol     Comment: Occasionally    Drug use: Never     Comment: Advise stop drinking       Tobacco/Alcohol/Opioid use, as well as Illicit Drug Use was screened for/reviewed and  documented in Social Documentation section of the chart and medication list as appropriate    Allergies and Medications  Allergies   Allergen Reactions    Iodine Unknown    Lisinopril Swelling and Unknown     Swelling of the face    Shellfish Containing Products Unknown and Other     Current Outpatient Medications   Medication Sig Dispense Refill    acetaminophen (Tylenol) 325 mg tablet Take by mouth every 6 hours if needed for mild pain (1 - 3).      amLODIPine (Norvasc) 5 mg tablet Take 1 tablet (5 mg) by mouth once daily. 90 tablet 3    atorvastatin (Lipitor) 40 mg tablet Take 1 tablet (40 mg) by mouth once daily. 90 tablet 3    cyanocobalamin (Vitamin B-12) 1,000 mcg tablet Take 1 tablet (1,000 mcg) by mouth once daily.      finasteride (Proscar) 5 mg tablet Take 1 tablet (5 mg) by mouth once daily. Do not crush, chew, or split. 90 tablet 3    folic acid (Folvite) 1 mg tablet Take 1 tablet (1 mg) by mouth once daily. 90 tablet 3    gabapentin (Neurontin) 300 mg capsule Take 1 capsule (300 mg) by mouth once daily in the morning AND 2 capsules (600 mg) once daily in the evening. 270 capsule 3    losartan (Cozaar) 100 mg tablet Take 1 tablet (100 mg) by mouth once daily. 90 tablet 3    meloxicam (Mobic) 15 mg tablet Take 1 tablet (15 mg) by mouth once daily. 90 tablet 1    sennosides (Senokot) 8.6 mg tablet Take 1 tablet (8.6 mg) by mouth once daily. 1 Tablet by mouth at bedtime for 7 days       No current facility-administered medications for this visit.       Medications and Supplements  prescribed by me and other practitioners or clinical pharmacist (such as prescriptions, OTC's, herbal therapies and supplements) were reviewed and documented in the medical record.     Advance directives  Advanced Care Planning (including a Living Will, Healthcare POA, as well as specific end of life choices and/or directives), was discussed for approximately 16 minutes with the patient and/or surrogate, voluntarily, and  "documented in the Problem List of the medical record.     Cardiac Risk Assessment  Cardiovascular risk was discussed and, if needed, lifestyle modifications recommended, including nutritional choices, exercise, and elimination of habits contributing to risk. We agreed on a plan to reduce the current cardiovascular risk based on above discussion as needed.  Aspirin use/disuse was discussed and documented in the Problem List of the medical record after reviewing the updated guidelines below:    Consider low dose Aspirin ( mg) use if the benefit for cardiovascular disease prevention outweighs risk for bleeding complications.   In general, low dose ASA should be considered:  In patients WITHOUT prior MI/stroke/PAD (primary prevention):   a. Age <60: Use if 10-year cardiovascular disease risk >20%, with discussion of risks and benefits with patient  b. Age 60-<70: Use if 10-year cardiovascular disease risk >20% and low bleeding (e.g., gastrointenstinal) risk, with discussion of risks and benefits with patient  c. Age >=70: Do not use    In patients WITH prior MI/stroke/PAD (secondary prevention):   Generally use unless extremely high bleeding (e.g., gastrointenstinal) risk, with discussion of risks and benefits with patient    ROS otherwise negative aside from what was mentioned above in HPI.    Visit Vitals  /86   Pulse 102   Temp 36.8 °C (98.2 °F)   Ht 1.791 m (5' 10.5\")   Wt 89.4 kg (197 lb)   SpO2 96%   BMI 27.87 kg/m²   Smoking Status Every Day   BSA 2.11 m²       Physical Exam  Physical Exam:    Appearance: Alert, oriented , cooperative,  in no acute distress. Well nourished & well hydrated.    Skin: Intact,  dry skin, no lesions, rash, petechiae or purpura.     Eyes: PERRLA, EOMs intact,  Conjunctiva pink with no redness or exudates. Cornea & anterior chamber are clear, Eyelids without lesions. No scleral icterus.     ENT: Minimal wax with deafness    Neck: Supple, without meningismus. Thyroid not " palpable. Trachea at midline. No lymphadenopathy.    Pulmonary: Barrel chest crackles rales rhonchi moderately r.    Cardiac: Systolic heart murmur    Abdomen: S mild hiatal hernia epigastric tenderness s.    Genitourinary: E prostate enlarged FOBT negative  Musculoskeletal: Cervical lumbar radiculopathy with arthritis cervical lumbar spine  Psychiatric: Appropriate mood and affect.         During the course of the visit the patient was educated and counseled about age appropriate screening and preventive services. Completed preventive screenings were documented in the chart and orders were placed for outstanding screenings/procedures as documented in the Assessment and Plan.    Patient Instructions (the written plan) was given to the patient at check out.    Charting was completed using voice recognition technology and may include unintended errors.

## 2025-03-11 ENCOUNTER — TELEPHONE (OUTPATIENT)
Dept: PRIMARY CARE | Facility: CLINIC | Age: 70
End: 2025-03-11
Payer: MEDICARE

## 2025-03-11 NOTE — TELEPHONE ENCOUNTER
ARCHANA  Pt called and said that the trelegy was to expensive    I did inform him to call his insurance and see what they will cover that equivalent. He will be calling back to let us know

## 2025-05-05 ENCOUNTER — APPOINTMENT (OUTPATIENT)
Dept: PRIMARY CARE | Facility: CLINIC | Age: 70
End: 2025-05-05
Payer: MEDICARE

## 2025-05-05 VITALS
SYSTOLIC BLOOD PRESSURE: 125 MMHG | HEART RATE: 116 BPM | HEIGHT: 71 IN | OXYGEN SATURATION: 93 % | DIASTOLIC BLOOD PRESSURE: 63 MMHG | BODY MASS INDEX: 28.5 KG/M2 | TEMPERATURE: 97.7 F | WEIGHT: 203.6 LBS

## 2025-05-05 DIAGNOSIS — F17.210 CIGARETTE SMOKER: ICD-10-CM

## 2025-05-05 DIAGNOSIS — S14.129A CENTRAL CORD SYNDROME AT UNSPECIFIED LEVEL OF CERVICAL SPINAL CORD, INITIAL ENCOUNTER: ICD-10-CM

## 2025-05-05 DIAGNOSIS — E78.2 MIXED HYPERLIPIDEMIA: ICD-10-CM

## 2025-05-05 DIAGNOSIS — K21.9 GASTROESOPHAGEAL REFLUX DISEASE, UNSPECIFIED WHETHER ESOPHAGITIS PRESENT: ICD-10-CM

## 2025-05-05 DIAGNOSIS — J44.9 CHRONIC OBSTRUCTIVE PULMONARY DISEASE, UNSPECIFIED COPD TYPE (MULTI): Primary | ICD-10-CM

## 2025-05-05 DIAGNOSIS — Z12.11 SCREEN FOR COLON CANCER: ICD-10-CM

## 2025-05-05 PROBLEM — E22.2 SIADH (SYNDROME OF INAPPROPRIATE ADH PRODUCTION) (MULTI): Status: RESOLVED | Noted: 2024-03-04 | Resolved: 2025-05-05

## 2025-05-05 PROCEDURE — 1160F RVW MEDS BY RX/DR IN RCRD: CPT | Performed by: INTERNAL MEDICINE

## 2025-05-05 PROCEDURE — 1159F MED LIST DOCD IN RCRD: CPT | Performed by: INTERNAL MEDICINE

## 2025-05-05 PROCEDURE — 1123F ACP DISCUSS/DSCN MKR DOCD: CPT | Performed by: INTERNAL MEDICINE

## 2025-05-05 PROCEDURE — 99214 OFFICE O/P EST MOD 30 MIN: CPT | Performed by: INTERNAL MEDICINE

## 2025-05-05 PROCEDURE — 3008F BODY MASS INDEX DOCD: CPT | Performed by: INTERNAL MEDICINE

## 2025-05-05 PROCEDURE — G2211 COMPLEX E/M VISIT ADD ON: HCPCS | Performed by: INTERNAL MEDICINE

## 2025-05-05 PROCEDURE — G0296 VISIT TO DETERM LDCT ELIG: HCPCS | Performed by: INTERNAL MEDICINE

## 2025-05-05 RX ORDER — PANTOPRAZOLE SODIUM 40 MG/1
40 TABLET, DELAYED RELEASE ORAL DAILY
Qty: 90 TABLET | Refills: 1 | Status: SHIPPED | OUTPATIENT
Start: 2025-05-05

## 2025-05-05 RX ORDER — LEVOFLOXACIN 500 MG/1
500 TABLET, FILM COATED ORAL DAILY
Qty: 10 TABLET | Refills: 0 | Status: SHIPPED | OUTPATIENT
Start: 2025-05-05 | End: 2025-05-15

## 2025-05-05 RX ORDER — PREDNISONE 10 MG/1
10 TABLET ORAL DAILY
Qty: 10 TABLET | Refills: 0 | Status: SHIPPED | OUTPATIENT
Start: 2025-05-05

## 2025-05-05 RX ORDER — ALBUTEROL SULFATE AND BUDESONIDE 90; 80 UG/1; UG/1
2 AEROSOL, METERED RESPIRATORY (INHALATION) EVERY 8 HOURS PRN
Qty: 10.7 G | Refills: 3 | Status: SHIPPED | OUTPATIENT
Start: 2025-05-05

## 2025-05-05 ASSESSMENT — PATIENT HEALTH QUESTIONNAIRE - PHQ9
2. FEELING DOWN, DEPRESSED OR HOPELESS: NOT AT ALL
SUM OF ALL RESPONSES TO PHQ9 QUESTIONS 1 AND 2: 0
1. LITTLE INTEREST OR PLEASURE IN DOING THINGS: NOT AT ALL

## 2025-05-05 NOTE — ASSESSMENT & PLAN NOTE
Given Levaquin 500 mg a day for 10 days prednisone 10 mg a day for 10 days watch for steroid psychosis QTc C. difficile and tendinitis sent for the CAT scan of the lungs CBC with differential refer patient to pulmonary Dr. Magaña for PFT

## 2025-05-05 NOTE — PROGRESS NOTES
Subjective   Patient ID: Yoan Sharp is a 69 y.o. male who presents for Follow-up (3 month /Discuss Trelogy ).    Assessment/Plan     Problem List Items Addressed This Visit       Mixed hyperlipidemia    Lipitor 40 mg a day monitor CMP CPK lipid once a year         Screen for colon cancer    Relevant Orders    Cologuard® colon cancer screening    Cigarette smoker    I discussed smoking history/status that determined patient with criteria for lung cancer screening with a low-dose CT scan. By using shared decision  making we  determinded the patient will benefit from the screening, including discussion of benefit and harms of screening, follow-up diagnostic testing, overt diagnosis, false positive rate, and total radiation exposure. I counseled the patient on the importance of adhering to a annul lung cancer low-dose CT scan screening, the impact off comorbidities, and inability or unwillingness to undergo's diagnosis and treatment if abnormality discovered. I provided patient an order for low-dose CT lung cancer screening    I spent 8 minutes counseling the patient on the importance of abstaining from the tobacco/cigarette smoking and  provided information about tobacco cessation intervention I provided patient an order for tobacco suggestion therapy             Central cord syndrome at unspecified level of cervical spinal cord, initial encounter    Physical therapy occupational therapy gabapentin refer to pain management         Chronic obstructive pulmonary disease (Multi) - Primary    Given Levaquin 500 mg a day for 10 days prednisone 10 mg a day for 10 days watch for steroid psychosis QTc C. difficile and tendinitis sent for the CAT scan of the lungs CBC with differential refer patient to pulmonary Dr. Magaña for PFT         Relevant Medications    levoFLOXacin (Levaquin) 500 mg tablet    predniSONE (Deltasone) 10 mg tablet    albuterol-budesonide (Airsupra) 90-80 mcg/actuation inhaler    Gastroesophageal  reflux disease    Refer to GI for EGD colonoscopy monitor magnesium H. pylori given Protonix 40 mg a day         Relevant Medications    pantoprazole (ProtoNix) 40 mg EC tablet   Patient was evaluated today, problem list was reviewed, problems and concerns addressed, Rx list reviewed and updated, lab and tests were noted and reviewed. Life style changes were discussed, always it works better if we eat plant based diet and plenty of fibres and roughage. Consume adequate amount of water and avoid alcohol, light to moderate physical activities and stress reduction are always beneficial for ongoing physical well being. Do not forget to have 6 to 7 hours of sleep regularly and avoid late night wilmer screen exposure.      HPIcolton Sharp is a 69 y.o. male who presents for Follow-up (3 month /Discuss Trelogy ).  69-year-old patient who continue to smoke cigarette history of hypertension hyperlipidemia COPD cervical radiculopathy complains of cough congestion shortness of breath onset acutely duration 1 week progressive acutely aggravating factor cold weather immunocompromise host microaspiration with a COPD exacerbation sent for the CAT scan CBC with differential pulmonary evaluation send monitor temperature oxygen at home    Given patient Protonix Levaquin prednisone probiotics soup Astepro 2 puffs every 8 as needed discontinue Trelegy because of the cost    Waiting for the blood test CAT scan and pulmonary evaluations and pain management evaluation    Hypertension hyperlipidemia patient will continue amlodipine Lipitor and Cozaar monitor the CMP CPK lipid    Refer patient to GI for EGD colonoscopy pulmonary for the COPD and pain management for the chronic cervical pain    Medical History[1]  Surgical History[2]  Allergies[3]  Current Medications[4]  Family History[5]  Social History[6]  Immunization History   Administered Date(s) Administered    Flu vaccine, quadrivalent, high-dose, preservative free, age 65y+ (FLUZONE)  "11/05/2020    Influenza, Unspecified 11/05/2020    Moderna SARS-CoV-2 Vaccination 03/03/2021, 04/07/2021, 11/19/2021    Pneumococcal conjugate vaccine, 20-valent (PREVNAR 20) 03/04/2024    Pneumococcal polysaccharide vaccine, 23-valent, age 2 years and older (PNEUMOVAX 23) 10/09/2018    Tdap vaccine, age 7 year and older (BOOSTRIX, ADACEL) 06/29/2022       Review of Systems  Review of systems is otherwise negative unless stated above or in history of present illness.    Objective   Visit Vitals  /69 (BP Location: Left arm, Patient Position: Sitting)   Pulse (!) 116   Temp 36.5 °C (97.7 °F)   Ht 1.791 m (5' 10.5\")   Wt 92.4 kg (203 lb 9.6 oz)   SpO2 93%   BMI 28.80 kg/m²   Smoking Status Every Day   BSA 2.14 m²   Repeat blood pressure 125/63 and heart rate was 90  Physical Exam  Constitutional: Anxiety     General: not in acute distress.   HENT:      Head: Normocephalic and atraumatic.      Nose: Nose normal.   Eyes: No jaundice     Extraocular Movements: Extraocular movements intact.      Conjunctiva/sclera: Conjunctivae normal.   Cardiovascular: Heart murmur     Rate and Rhythm: Normal rate ,  No M/R/G  Pulmonary: Crackles rales rhonchi     Effort: Pulmonary effort is normal.      Breath sounds: Normal, Bilat Equal AE  Skin:     General: Skin is warm.   Neurological: Cervical radiculopathy     Mental Status: He is alert and oriented to person, place, and time.   Psychiatric:         Mood and Affect: Mood normal.         Behavior: Behavior normal.   Musculoskeletal advanced arthritis of the cervical spine  FROM in all extremitirs,  Joint-no swelling or tenderness    No visits with results within 4 Month(s) from this visit.   Latest known visit with results is:   Lab on 09/30/2024   Component Date Value Ref Range Status    WBC 09/30/2024 5.6  4.4 - 11.3 x10*3/uL Final    nRBC 09/30/2024 0.0  0.0 - 0.0 /100 WBCs Final    RBC 09/30/2024 4.64  4.50 - 5.90 x10*6/uL Final    Hemoglobin 09/30/2024 14.7  13.5 - 17.5 " g/dL Final    Hematocrit 09/30/2024 42.5  41.0 - 52.0 % Final    MCV 09/30/2024 92  80 - 100 fL Final    MCH 09/30/2024 31.7  26.0 - 34.0 pg Final    MCHC 09/30/2024 34.6  32.0 - 36.0 g/dL Final    RDW 09/30/2024 12.7  11.5 - 14.5 % Final    Platelets 09/30/2024 258  150 - 450 x10*3/uL Final    Neutrophils % 09/30/2024 57.7  40.0 - 80.0 % Final    Immature Granulocytes %, Automated 09/30/2024 0.2  0.0 - 0.9 % Final    Lymphocytes % 09/30/2024 28.6  13.0 - 44.0 % Final    Monocytes % 09/30/2024 9.4  2.0 - 10.0 % Final    Eosinophils % 09/30/2024 3.2  0.0 - 6.0 % Final    Basophils % 09/30/2024 0.9  0.0 - 2.0 % Final    Neutrophils Absolute 09/30/2024 3.24  1.20 - 7.70 x10*3/uL Final    Immature Granulocytes Absolute, Au* 09/30/2024 0.01  0.00 - 0.70 x10*3/uL Final    Lymphocytes Absolute 09/30/2024 1.61  1.20 - 4.80 x10*3/uL Final    Monocytes Absolute 09/30/2024 0.53  0.10 - 1.00 x10*3/uL Final    Eosinophils Absolute 09/30/2024 0.18  0.00 - 0.70 x10*3/uL Final    Basophils Absolute 09/30/2024 0.05  0.00 - 0.10 x10*3/uL Final    Glucose 09/30/2024 114 (H)  74 - 99 mg/dL Final    Sodium 09/30/2024 131 (L)  136 - 145 mmol/L Final    Potassium 09/30/2024 4.7  3.5 - 5.3 mmol/L Final    Chloride 09/30/2024 95 (L)  98 - 107 mmol/L Final    Bicarbonate 09/30/2024 28  21 - 32 mmol/L Final    Anion Gap 09/30/2024 13  10 - 20 mmol/L Final    Urea Nitrogen 09/30/2024 12  6 - 23 mg/dL Final    Creatinine 09/30/2024 1.11  0.50 - 1.30 mg/dL Final    eGFR 09/30/2024 72  >60 mL/min/1.73m*2 Final    Calcium 09/30/2024 10.3  8.6 - 10.6 mg/dL Final    Albumin 09/30/2024 4.9  3.4 - 5.0 g/dL Final    Alkaline Phosphatase 09/30/2024 102  33 - 136 U/L Final    Total Protein 09/30/2024 7.5  6.4 - 8.2 g/dL Final    AST 09/30/2024 27  9 - 39 U/L Final    Bilirubin, Total 09/30/2024 1.0  0.0 - 1.2 mg/dL Final    ALT 09/30/2024 38  10 - 52 U/L Final    Cholesterol 09/30/2024 215 (H)  0 - 199 mg/dL Final    HDL-Cholesterol 09/30/2024 97.6   mg/dL Final    Cholesterol/HDL Ratio 09/30/2024 2.2   Final    LDL Calculated 09/30/2024 104 (H)  <=99 mg/dL Final    VLDL 09/30/2024 14  0 - 40 mg/dL Final    Triglycerides 09/30/2024 68  0 - 149 mg/dL Final    Non HDL Cholesterol 09/30/2024 117  0 - 149 mg/dL Final    Prostate Specific Antigen,Screen 09/30/2024 0.49  <=4.00 ng/mL Final    Thyroid Stimulating Hormone 09/30/2024 2.39  0.44 - 3.98 mIU/L Final    Creatine Kinase 09/30/2024 94  0 - 325 U/L Final       Radiology: Reviewed imaging in powerchart.  Imaging  No results found.    Cardiology, Vascular, and Other Imaging  No other imaging results found for the past 7 days        Charting was completed using voice recognition technology and may include unintended errors.            [1] History reviewed. No pertinent past medical history.  [2]   Past Surgical History:  Procedure Laterality Date    CERVICAL FUSION  12/30/2022    HARDWARE REMOVAL  03/06/2023    WOUND DEBRIDEMENT     [3]   Allergies  Allergen Reactions    Iodine Unknown    Lisinopril Swelling and Unknown     Swelling of the face    Shellfish Containing Products Unknown and Other   [4]   Current Outpatient Medications   Medication Sig Dispense Refill    acetaminophen (Tylenol) 325 mg tablet Take by mouth every 6 hours if needed for mild pain (1 - 3).      amLODIPine (Norvasc) 5 mg tablet Take 1 tablet (5 mg) by mouth once daily. 90 tablet 3    atorvastatin (Lipitor) 40 mg tablet Take 1 tablet (40 mg) by mouth once daily. 90 tablet 3    cyanocobalamin (Vitamin B-12) 1,000 mcg tablet Take 1 tablet (1,000 mcg) by mouth once daily.      finasteride (Proscar) 5 mg tablet Take 1 tablet (5 mg) by mouth once daily. Do not crush, chew, or split. 90 tablet 3    folic acid (Folvite) 1 mg tablet Take 1 tablet (1 mg) by mouth once daily. 90 tablet 3    gabapentin (Neurontin) 300 mg capsule Take 1 capsule (300 mg) by mouth once daily in the morning AND 2 capsules (600 mg) once daily in the evening. 270 capsule 3     losartan (Cozaar) 100 mg tablet Take 1 tablet (100 mg) by mouth once daily. 90 tablet 3    meloxicam (Mobic) 15 mg tablet Take 1 tablet (15 mg) by mouth once daily. 90 tablet 1    albuterol-budesonide (Airsupra) 90-80 mcg/actuation inhaler Inhale 2 puffs every 8 hours if needed (as needed). 10.7 g 3    levoFLOXacin (Levaquin) 500 mg tablet Take 1 tablet (500 mg) by mouth once daily for 10 days. 10 tablet 0    pantoprazole (ProtoNix) 40 mg EC tablet Take 1 tablet (40 mg) by mouth once daily. Do not crush, chew, or split. 90 tablet 1    predniSONE (Deltasone) 10 mg tablet Take 1 tablet (10 mg) by mouth once daily. 10 tablet 0     No current facility-administered medications for this visit.   [5]   Family History  Problem Relation Name Age of Onset    Heart disease Father     [6]   Social History  Socioeconomic History    Marital status:    Tobacco Use    Smoking status: Every Day     Current packs/day: 0.50     Average packs/day: 0.5 packs/day for 30.3 years (15.2 ttl pk-yrs)     Types: Cigarettes     Start date: 1995     Passive exposure: Past    Smokeless tobacco: Never    Tobacco comments:     Nicotine patch   Substance and Sexual Activity    Alcohol use: Yes     Alcohol/week: 0.0 - 6.0 standard drinks of alcohol     Comment: Occasionally    Drug use: Never     Comment: Advise stop drinking

## 2025-05-06 ENCOUNTER — TELEPHONE (OUTPATIENT)
Dept: PRIMARY CARE | Facility: CLINIC | Age: 70
End: 2025-05-06
Payer: MEDICARE

## 2025-05-06 LAB
ALBUMIN SERPL-MCNC: 4.7 G/DL (ref 3.6–5.1)
ALP SERPL-CCNC: 83 U/L (ref 35–144)
ALT SERPL-CCNC: 28 U/L (ref 9–46)
ANION GAP SERPL CALCULATED.4IONS-SCNC: 9 MMOL/L (CALC) (ref 7–17)
AST SERPL-CCNC: 28 U/L (ref 10–35)
BASOPHILS # BLD AUTO: 28 CELLS/UL (ref 0–200)
BASOPHILS NFR BLD AUTO: 0.5 %
BILIRUB SERPL-MCNC: 0.5 MG/DL (ref 0.2–1.2)
BUN SERPL-MCNC: 22 MG/DL (ref 7–25)
CALCIUM SERPL-MCNC: 9.8 MG/DL (ref 8.6–10.3)
CHLORIDE SERPL-SCNC: 100 MMOL/L (ref 98–110)
CHOLEST SERPL-MCNC: 192 MG/DL
CHOLEST/HDLC SERPL: 1.8 (CALC)
CO2 SERPL-SCNC: 28 MMOL/L (ref 20–32)
CREAT SERPL-MCNC: 1.14 MG/DL (ref 0.7–1.35)
EGFRCR SERPLBLD CKD-EPI 2021: 70 ML/MIN/1.73M2
EOSINOPHIL # BLD AUTO: 230 CELLS/UL (ref 15–500)
EOSINOPHIL NFR BLD AUTO: 4.1 %
ERYTHROCYTE [DISTWIDTH] IN BLOOD BY AUTOMATED COUNT: 12.3 % (ref 11–15)
EST. AVERAGE GLUCOSE BLD GHB EST-MCNC: 108 MG/DL
EST. AVERAGE GLUCOSE BLD GHB EST-SCNC: 6 MMOL/L
GLUCOSE SERPL-MCNC: 145 MG/DL (ref 65–139)
HBA1C MFR BLD: 5.4 %
HCT VFR BLD AUTO: 39.2 % (ref 38.5–50)
HDLC SERPL-MCNC: 107 MG/DL
HGB BLD-MCNC: 13.3 G/DL (ref 13.2–17.1)
LDLC SERPL CALC-MCNC: 71 MG/DL (CALC)
LYMPHOCYTES # BLD AUTO: 1120 CELLS/UL (ref 850–3900)
LYMPHOCYTES NFR BLD AUTO: 20 %
MAGNESIUM SERPL-MCNC: 1.7 MG/DL (ref 1.5–2.5)
MCH RBC QN AUTO: 32.1 PG (ref 27–33)
MCHC RBC AUTO-ENTMCNC: 33.9 G/DL (ref 32–36)
MCV RBC AUTO: 94.7 FL (ref 80–100)
MONOCYTES # BLD AUTO: 403 CELLS/UL (ref 200–950)
MONOCYTES NFR BLD AUTO: 7.2 %
NEUTROPHILS # BLD AUTO: 3819 CELLS/UL (ref 1500–7800)
NEUTROPHILS NFR BLD AUTO: 68.2 %
NONHDLC SERPL-MCNC: 85 MG/DL (CALC)
PLATELET # BLD AUTO: 209 THOUSAND/UL (ref 140–400)
PMV BLD REES-ECKER: 9.9 FL (ref 7.5–12.5)
POTASSIUM SERPL-SCNC: 4.7 MMOL/L (ref 3.5–5.3)
PROT SERPL-MCNC: 7.4 G/DL (ref 6.1–8.1)
PSA SERPL-MCNC: 0.38 NG/ML
RBC # BLD AUTO: 4.14 MILLION/UL (ref 4.2–5.8)
SODIUM SERPL-SCNC: 137 MMOL/L (ref 135–146)
TRIGL SERPL-MCNC: 61 MG/DL
TSH SERPL-ACNC: 0.76 MIU/L (ref 0.4–4.5)
URATE SERPL-MCNC: 5.1 MG/DL (ref 4–8)
WBC # BLD AUTO: 5.6 THOUSAND/UL (ref 3.8–10.8)

## 2025-06-05 ENCOUNTER — OFFICE VISIT (OUTPATIENT)
Dept: PRIMARY CARE | Facility: CLINIC | Age: 70
End: 2025-06-05
Payer: MEDICARE

## 2025-06-05 ENCOUNTER — APPOINTMENT (OUTPATIENT)
Dept: PRIMARY CARE | Facility: CLINIC | Age: 70
End: 2025-06-05
Payer: MEDICARE

## 2025-06-05 VITALS
OXYGEN SATURATION: 93 % | TEMPERATURE: 97 F | BODY MASS INDEX: 28.8 KG/M2 | HEART RATE: 101 BPM | HEIGHT: 71 IN | SYSTOLIC BLOOD PRESSURE: 156 MMHG | DIASTOLIC BLOOD PRESSURE: 88 MMHG

## 2025-06-05 DIAGNOSIS — Z13.6 SCREENING FOR ABDOMINAL AORTIC ANEURYSM: ICD-10-CM

## 2025-06-05 DIAGNOSIS — J43.1 PANLOBULAR EMPHYSEMA (MULTI): ICD-10-CM

## 2025-06-05 DIAGNOSIS — R05.9 COUGH, UNSPECIFIED TYPE: ICD-10-CM

## 2025-06-05 DIAGNOSIS — Z87.891 AGGRESSIVE FORMER SMOKER: ICD-10-CM

## 2025-06-05 DIAGNOSIS — N40.0 BENIGN PROSTATIC HYPERPLASIA WITHOUT LOWER URINARY TRACT SYMPTOMS: ICD-10-CM

## 2025-06-05 DIAGNOSIS — M47.12 CERVICAL SPONDYLOSIS WITH MYELOPATHY: ICD-10-CM

## 2025-06-05 DIAGNOSIS — Z12.11 SCREEN FOR COLON CANCER: ICD-10-CM

## 2025-06-05 DIAGNOSIS — Z12.2 ENCOUNTER FOR SCREENING FOR LUNG CANCER: ICD-10-CM

## 2025-06-05 DIAGNOSIS — K21.00 GASTROESOPHAGEAL REFLUX DISEASE WITH ESOPHAGITIS WITHOUT HEMORRHAGE: ICD-10-CM

## 2025-06-05 DIAGNOSIS — F17.210 CIGARETTE SMOKER: ICD-10-CM

## 2025-06-05 DIAGNOSIS — I10 PRIMARY HYPERTENSION: Primary | ICD-10-CM

## 2025-06-05 DIAGNOSIS — E78.2 MIXED HYPERLIPIDEMIA: ICD-10-CM

## 2025-06-05 PROBLEM — S14.129A: Status: RESOLVED | Noted: 2025-05-05 | Resolved: 2025-06-05

## 2025-06-05 PROCEDURE — 99406 BEHAV CHNG SMOKING 3-10 MIN: CPT | Performed by: INTERNAL MEDICINE

## 2025-06-05 PROCEDURE — 99214 OFFICE O/P EST MOD 30 MIN: CPT | Performed by: INTERNAL MEDICINE

## 2025-06-05 PROCEDURE — 1160F RVW MEDS BY RX/DR IN RCRD: CPT | Performed by: INTERNAL MEDICINE

## 2025-06-05 PROCEDURE — G0296 VISIT TO DETERM LDCT ELIG: HCPCS | Performed by: INTERNAL MEDICINE

## 2025-06-05 PROCEDURE — 1159F MED LIST DOCD IN RCRD: CPT | Performed by: INTERNAL MEDICINE

## 2025-06-05 PROCEDURE — 3077F SYST BP >= 140 MM HG: CPT | Performed by: INTERNAL MEDICINE

## 2025-06-05 PROCEDURE — 3080F DIAST BP >= 90 MM HG: CPT | Performed by: INTERNAL MEDICINE

## 2025-06-05 PROCEDURE — 3079F DIAST BP 80-89 MM HG: CPT | Performed by: INTERNAL MEDICINE

## 2025-06-05 RX ORDER — AMLODIPINE BESYLATE 5 MG/1
5 TABLET ORAL DAILY
Qty: 90 TABLET | Refills: 3 | Status: SHIPPED | OUTPATIENT
Start: 2025-06-05 | End: 2025-06-05

## 2025-06-05 RX ORDER — ATORVASTATIN CALCIUM 40 MG/1
40 TABLET, FILM COATED ORAL DAILY
Qty: 90 TABLET | Refills: 3 | Status: SHIPPED | OUTPATIENT
Start: 2025-06-05

## 2025-06-05 RX ORDER — AMLODIPINE BESYLATE 10 MG/1
10 TABLET ORAL DAILY
Qty: 90 TABLET | Refills: 3 | Status: SHIPPED | OUTPATIENT
Start: 2025-06-05

## 2025-06-05 RX ORDER — BUPROPION HYDROCHLORIDE 75 MG/1
75 TABLET ORAL 3 TIMES DAILY
Qty: 270 TABLET | Refills: 0 | Status: SHIPPED | OUTPATIENT
Start: 2025-06-05

## 2025-06-05 RX ORDER — LOSARTAN POTASSIUM 100 MG/1
100 TABLET ORAL DAILY
Qty: 90 TABLET | Refills: 3 | Status: SHIPPED | OUTPATIENT
Start: 2025-06-05

## 2025-06-05 ASSESSMENT — PATIENT HEALTH QUESTIONNAIRE - PHQ9
1. LITTLE INTEREST OR PLEASURE IN DOING THINGS: NOT AT ALL
SUM OF ALL RESPONSES TO PHQ9 QUESTIONS 1 AND 2: 0
2. FEELING DOWN, DEPRESSED OR HOPELESS: NOT AT ALL

## 2025-06-05 NOTE — PROGRESS NOTES
Subjective   Patient ID: Yoan Sharp is a 69 y.o. male who presents for Follow-up (4 week ).    Assessment/Plan     Problem List Items Addressed This Visit       Mixed hyperlipidemia    Relevant Medications    atorvastatin (Lipitor) 40 mg tablet    Primary hypertension - Primary    Relevant Medications    losartan (Cozaar) 100 mg tablet    amLODIPine (Norvasc) 5 mg tablet    Screening for abdominal aortic aneurysm    Relevant Orders    Vascular US Abdominal Aorta Aneurysm AAA Screening    Benign prostatic hyperplasia    Cervical spondylosis with myelopathy    Cigarette smoker    Wellbutrin 75 mg 3 times a day thiamine 100 mg a day advised to cut down and stop smoking and alcohol refer patient to chemical dependency program [facility] advised a low-dose CAT scan of the lung ultrasound the aorta calcium score for the heart I spent 10 minutes counseling patient about need for smoking/tobacco cessation and support discuss the nicotine  replacement therapy ,varenicline,bupropion,  hypnosis, support group, acupuncture as a potential options  .  I discussed smoking history/status that determined patient with criteria for lung cancer screening with a low-dose CT scan. By using shared decision  making we  determinded the patient will benefit from the screening, including discussion of benefit and harms of screening, follow-up diagnostic testing, overt diagnosis, false positive rate, and total radiation exposure. I counseled the patient on the importance of adhering to a annul lung cancer low-dose CT scan screening, the impact off comorbidities, and inability or unwillingness to undergo's diagnosis and treatment if abnormality discovered. I provided patient an order for low-dose CT lung cancer screening    I spent 8 minutes counseling the patient on the importance of abstaining from the tobacco/cigarette smoking and  provided information about tobacco cessation intervention I provided patient an order for tobacco  suggestion therapy             Chronic obstructive pulmonary disease (Multi)    Gastroesophageal reflux disease    Cough    Relevant Orders    XR chest 2 views     Other Visit Diagnoses         Aggressive former smoker        Relevant Orders    CT lung screening low dose        Glucose 145 A1c 5.4 LDL 71 normal PSA advised low-fat low-carb diet follow-up     HPI 69-year-old patient who had a history of moderate amount of alcohol and smoking for many many years advised to cut down and stop both [facility] order CAT scan of the low-dose of the lung and a calcium score for the ultrasound the aorta and ultrasound of the liver gallbladder pancreas given patient B12 folic acid thiamine Wellbutrin and nicotine patch    Negative for headache chest pain hematuria rectal bleeding    Negative for suicidal fall    BPH Proscar    Neuropathy B12 folic acid gabapentin    Hypertension Cozaar 100 mg a day amlodipine 10 mg a day monitor BMP    Gastritis Protonix 40 mg a day    COPD Trelegy 1 puff a day PFT sent for the pulmonary service and CAT scan of the lung    At age of 69 male skin cancer prostate cancer colon cancer lung cancer screening flu pneumonia COVID-19 vaccines follow-up 3 to 4 weeksSurgical History[1]  Allergies[2]  Current Medications[3]  Family History[4]  Social History[5]  Immunization History   Administered Date(s) Administered    Flu vaccine, quadrivalent, high-dose, preservative free, age 65y+ (FLUZONE) 11/05/2020    Influenza, Unspecified 11/05/2020    Moderna SARS-CoV-2 Vaccination 03/03/2021, 04/07/2021, 11/19/2021    Pneumococcal conjugate vaccine, 20-valent (PREVNAR 20) 03/04/2024    Pneumococcal polysaccharide vaccine, 23-valent, age 2 years and older (PNEUMOVAX 23) 10/09/2018    Tdap vaccine, age 7 year and older (BOOSTRIX, ADACEL) 06/29/2022       Review of Systems  Review of systems is otherwise negative unless stated above or in history of  "present illness.    Objective   Visit Vitals  BP (!) 183/105 (BP Location: Left arm, Patient Position: Sitting)   Pulse 101   Temp 36.1 °C (97 °F)   Ht 1.791 m (5' 10.5\")   SpO2 93%   BMI 28.80 kg/m²   Smoking Status Every Day   BSA 2.14 m²     Physical Exam  Constitutional: Anxiety     General: not in acute distress.   HENT: JVD     Head: Normocephalic and atraumatic.      Nose: Nose normal.   Eyes:      Extraocular Movements: Extraocular movements intact.      Conjunctiva/sclera: Conjunctivae normal.   Cardiovascular: S1-S2 S4     Rate and Rhythm: Normal rate ,  No M/R/G  Pulmonary: Crackles rales rhonchi     Effort: Pulmonary effort is normal.      Breath sounds: Normal, Bilat Equal AE  Skin:     General: Skin is warm.   Neurological: Lumbar radiculopathy     Mental Status: He is alert and oriented to person, place, and time.   Psychiatric:         Mood and Affect: Mood normal.         Behavior: Behavior normal.   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   Latest known visit with results is:   Office Visit on 02/04/2025   Component Date Value Ref Range Status    WHITE BLOOD CELL COUNT 05/05/2025 5.6  3.8 - 10.8 Thousand/uL Final    RED BLOOD CELL COUNT 05/05/2025 4.14 (L)  4.20 - 5.80 Million/uL Final    HEMOGLOBIN 05/05/2025 13.3  13.2 - 17.1 g/dL Final    HEMATOCRIT 05/05/2025 39.2  38.5 - 50.0 % Final    MCV 05/05/2025 94.7  80.0 - 100.0 fL Final    MCH 05/05/2025 32.1  27.0 - 33.0 pg Final    MCHC 05/05/2025 33.9  32.0 - 36.0 g/dL Final    RDW 05/05/2025 12.3  11.0 - 15.0 % Final    PLATELET COUNT 05/05/2025 209  140 - 400 Thousand/uL Final    MPV 05/05/2025 9.9  7.5 - 12.5 fL Final    ABSOLUTE NEUTROPHILS 05/05/2025 3,819  1,500 - 7,800 cells/uL Final    ABSOLUTE LYMPHOCYTES 05/05/2025 1,120  850 - 3,900 cells/uL Final    ABSOLUTE MONOCYTES 05/05/2025 403  200 - 950 " cells/uL Final    ABSOLUTE EOSINOPHILS 05/05/2025 230  15 - 500 cells/uL Final    ABSOLUTE BASOPHILS 05/05/2025 28  0 - 200 cells/uL Final    NEUTROPHILS 05/05/2025 68.2  % Final    LYMPHOCYTES 05/05/2025 20.0  % Final    MONOCYTES 05/05/2025 7.2  % Final    EOSINOPHILS 05/05/2025 4.1  % Final    BASOPHILS 05/05/2025 0.5  % Final    GLUCOSE 05/05/2025 145 (H)  65 - 139 mg/dL Final    UREA NITROGEN (BUN) 05/05/2025 22  7 - 25 mg/dL Final    CREATININE 05/05/2025 1.14  0.70 - 1.35 mg/dL Final    EGFR 05/05/2025 70  > OR = 60 mL/min/1.73m2 Final    SODIUM 05/05/2025 137  135 - 146 mmol/L Final    POTASSIUM 05/05/2025 4.7  3.5 - 5.3 mmol/L Final    CHLORIDE 05/05/2025 100  98 - 110 mmol/L Final    CARBON DIOXIDE 05/05/2025 28  20 - 32 mmol/L Final    ELECTROLYTE BALANCE 05/05/2025 9  7 - 17 mmol/L (calc) Final    CALCIUM 05/05/2025 9.8  8.6 - 10.3 mg/dL Final    PROTEIN, TOTAL 05/05/2025 7.4  6.1 - 8.1 g/dL Final    ALBUMIN 05/05/2025 4.7  3.6 - 5.1 g/dL Final    BILIRUBIN, TOTAL 05/05/2025 0.5  0.2 - 1.2 mg/dL Final    ALKALINE PHOSPHATASE 05/05/2025 83  35 - 144 U/L Final    AST 05/05/2025 28  10 - 35 U/L Final    ALT 05/05/2025 28  9 - 46 U/L Final    HEMOGLOBIN A1c 05/05/2025 5.4  <5.7 % Final    eAG (mg/dL) 05/05/2025 108  mg/dL Final    eAG (mmol/L) 05/05/2025 6.0  mmol/L Final    CHOLESTEROL, TOTAL 05/05/2025 192  <200 mg/dL Final    HDL CHOLESTEROL 05/05/2025 107  > OR = 40 mg/dL Final    TRIGLYCERIDES 05/05/2025 61  <150 mg/dL Final    LDL-CHOLESTEROL 05/05/2025 71  mg/dL (calc) Final    CHOL/HDLC RATIO 05/05/2025 1.8  <5.0 (calc) Final    NON HDL CHOLESTEROL 05/05/2025 85  <130 mg/dL (calc) Final    MAGNESIUM 05/05/2025 1.7  1.5 - 2.5 mg/dL Final    URIC ACID 05/05/2025 5.1  4.0 - 8.0 mg/dL Final    TSH W/REFLEX TO FT4 05/05/2025 0.76  0.40 - 4.50 mIU/L Final    PSA, TOTAL 05/05/2025 0.38  < OR = 4.00 ng/mL Final       Radiology: Reviewed imaging in powerchart.  Imaging  No results found.    Cardiology,  Vascular, and Other Imaging  No other imaging results found for the past 7 days        Charting was completed using voice recognition technology and may include unintended errors.            [1]   Past Surgical History:  Procedure Laterality Date    CERVICAL FUSION  12/30/2022    HARDWARE REMOVAL  03/06/2023    WOUND DEBRIDEMENT     [2]   Allergies  Allergen Reactions    Iodine Unknown    Lisinopril Swelling and Unknown     Swelling of the face    Shellfish Containing Products Unknown and Other   [3]   Current Outpatient Medications   Medication Sig Dispense Refill    acetaminophen (Tylenol) 325 mg tablet Take by mouth every 6 hours if needed for mild pain (1 - 3).      albuterol-budesonide (Airsupra) 90-80 mcg/actuation inhaler Inhale 2 puffs every 8 hours if needed (as needed). 10.7 g 3    cyanocobalamin (Vitamin B-12) 1,000 mcg tablet Take 1 tablet (1,000 mcg) by mouth once daily.      finasteride (Proscar) 5 mg tablet Take 1 tablet (5 mg) by mouth once daily. Do not crush, chew, or split. 90 tablet 3    folic acid (Folvite) 1 mg tablet Take 1 tablet (1 mg) by mouth once daily. 90 tablet 3    gabapentin (Neurontin) 300 mg capsule Take 1 capsule (300 mg) by mouth once daily in the morning AND 2 capsules (600 mg) once daily in the evening. 270 capsule 3    meloxicam (Mobic) 15 mg tablet Take 1 tablet (15 mg) by mouth once daily. 90 tablet 1    pantoprazole (ProtoNix) 40 mg EC tablet Take 1 tablet (40 mg) by mouth once daily. Do not crush, chew, or split. 90 tablet 1    amLODIPine (Norvasc) 5 mg tablet Take 1 tablet (5 mg) by mouth once daily. 90 tablet 3    atorvastatin (Lipitor) 40 mg tablet Take 1 tablet (40 mg) by mouth once daily. 90 tablet 3    losartan (Cozaar) 100 mg tablet Take 1 tablet (100 mg) by mouth once daily. 90 tablet 3     No current facility-administered medications for this visit.   [4]   Family History  Problem Relation Name Age of Onset    Heart disease Father     [5]   Social  History  Socioeconomic History    Marital status:    Tobacco Use    Smoking status: Every Day     Current packs/day: 0.50     Average packs/day: 0.5 packs/day for 30.4 years (15.2 ttl pk-yrs)     Types: Cigarettes     Start date: 1995     Passive exposure: Past    Smokeless tobacco: Never    Tobacco comments:     Nicotine patch   Substance and Sexual Activity    Alcohol use: Yes     Alcohol/week: 0.0 - 6.0 standard drinks of alcohol     Comment: Occasionally    Drug use: Never     Comment: Advise stop drinking

## 2025-06-05 NOTE — ASSESSMENT & PLAN NOTE
Wellbutrin 75 mg 3 times a day thiamine 100 mg a day advised to cut down and stop smoking and alcohol refer patient to [facility] advised a low-dose CAT scan of the lung ultrasound the aorta calcium score for the heart I spent 10 minutes counseling patient about need for smoking/tobacco cessation and support discuss the nicotine  replacement therapy ,varenicline,bupropion,  hypnosis, support group, acupuncture as a potential options  .  I discussed smoking history/status that determined patient with criteria for lung cancer screening with a low-dose CT scan. By using shared decision  making we  determinded the patient will benefit from the screening, including discussion of benefit and harms of screening, follow-up diagnostic testing, overt diagnosis, false positive rate, and total radiation exposure. I counseled the patient on the importance of adhering to a annul lung cancer low-dose CT scan screening, the impact off comorbidities, and inability or unwillingness to undergo's diagnosis and treatment if abnormality discovered. I provided patient an order for low-dose CT lung cancer screening    I spent 8 minutes counseling the patient on the importance of abstaining from the tobacco/cigarette smoking and  provided information about tobacco cessation intervention I provided patient an order for tobacco suggestion therapy

## 2025-06-06 ENCOUNTER — TELEPHONE (OUTPATIENT)
Dept: GASTROENTEROLOGY | Facility: EXTERNAL LOCATION | Age: 70
End: 2025-06-06
Payer: MEDICARE

## 2025-08-15 ENCOUNTER — APPOINTMENT (OUTPATIENT)
Dept: PRIMARY CARE | Facility: CLINIC | Age: 70
End: 2025-08-15
Payer: MEDICARE